# Patient Record
Sex: MALE | Race: WHITE | Employment: OTHER | URBAN - METROPOLITAN AREA
[De-identification: names, ages, dates, MRNs, and addresses within clinical notes are randomized per-mention and may not be internally consistent; named-entity substitution may affect disease eponyms.]

---

## 2017-03-02 ENCOUNTER — TELEPHONE (OUTPATIENT)
Dept: CARDIOLOGY CLINIC | Age: 67
End: 2017-03-02

## 2017-08-23 ENCOUNTER — OFFICE VISIT (OUTPATIENT)
Dept: CARDIOLOGY CLINIC | Age: 67
End: 2017-08-23

## 2017-08-23 VITALS
HEIGHT: 72 IN | SYSTOLIC BLOOD PRESSURE: 138 MMHG | HEART RATE: 60 BPM | WEIGHT: 200 LBS | DIASTOLIC BLOOD PRESSURE: 80 MMHG | BODY MASS INDEX: 27.09 KG/M2

## 2017-08-23 DIAGNOSIS — Z95.1 S/P CABG X 4: Primary | ICD-10-CM

## 2017-08-23 PROCEDURE — 99214 OFFICE O/P EST MOD 30 MIN: CPT | Performed by: INTERNAL MEDICINE

## 2017-08-23 PROCEDURE — G8427 DOCREV CUR MEDS BY ELIG CLIN: HCPCS | Performed by: INTERNAL MEDICINE

## 2017-08-23 PROCEDURE — 1036F TOBACCO NON-USER: CPT | Performed by: INTERNAL MEDICINE

## 2017-08-23 PROCEDURE — 4040F PNEUMOC VAC/ADMIN/RCVD: CPT | Performed by: INTERNAL MEDICINE

## 2017-08-23 PROCEDURE — 3017F COLORECTAL CA SCREEN DOC REV: CPT | Performed by: INTERNAL MEDICINE

## 2017-08-23 PROCEDURE — 1123F ACP DISCUSS/DSCN MKR DOCD: CPT | Performed by: INTERNAL MEDICINE

## 2017-08-23 PROCEDURE — G8419 CALC BMI OUT NRM PARAM NOF/U: HCPCS | Performed by: INTERNAL MEDICINE

## 2017-08-23 RX ORDER — FLUTICASONE FUROATE AND VILANTEROL 200; 25 UG/1; UG/1
1 POWDER RESPIRATORY (INHALATION) DAILY
COMMUNITY
End: 2019-08-30 | Stop reason: SDUPTHER

## 2017-09-12 ENCOUNTER — HOSPITAL ENCOUNTER (OUTPATIENT)
Dept: GENERAL RADIOLOGY | Age: 67
Discharge: HOME | End: 2017-09-12
Attending: FAMILY MEDICINE | Admitting: FAMILY MEDICINE

## 2017-09-12 DIAGNOSIS — M25.511 PAIN, JOINT, SHOULDER, RIGHT: ICD-10-CM

## 2017-09-26 ENCOUNTER — HOSPITAL ENCOUNTER (OUTPATIENT)
Dept: PHYSICAL THERAPY | Age: 67
Discharge: HOME | End: 2017-09-30
Attending: FAMILY MEDICINE | Admitting: FAMILY MEDICINE

## 2017-09-26 NOTE — FLOWSHEET NOTE
Physical Therapy Daily Treatment Note  Date:  2017    Patient Name:  Urszula Garcia    :  1950  MRN: [id number]  Restrictions/Precautions:    Diagnosis: R shoulder Pain  Treatment Diagnosis: Chronic R GH joint pain; decreased AROM, strength, and functional use of R GH joint  Insurance/Certification information:  Medicare/[employer]  Next Physician Visit:  Referring Physician:  Isabel FABIAN EXPIRATION DATE  Visit# / total visits:   Medicare/[employer], gcoded               Initial Pain level: /10     Subjective: Any changes to Ambulatory Summary Sheet? Goals:  Short term goals  Time Frame for Short term goals: 10 visits  Short term goal 1: Ind with HEP with good compliance  Short term goal 2: Sleeping without pain R shoudler  Short term goal 3: Quick Dash score 14    G-Code  PT G-Codes  Functional Limitation: Carrying, moving and handling objects  Carrying, Moving and Handling Objects Current Status (): At least 20 percent but less than 40 percent impaired, limited or restricted  Carrying, Moving and Handling Objects Goal Status (): At least 1 percent but less than 20 percent impaired, limited or restricted     Patient's goal:  Skilled PT activities: Date 17  #1 Date Date Date     Outcome measure used Quick dash         On visit# 1 19      Scap squeezes   3x 10      IR towel stretch behind back   8 ct, 2 x10      Cane ER stretch with elbow at side R   8 ct, 3x10       Sup.  Cane FF stretch  8 ct, 2 x10      Sup horizontal add stretch   8 ct, 2 x 10      Add standing pec stretches         Add t-band ER, IR, and ext                                                                    Progress/goals assessment (every 10 visits) by  PT           HEP: As above      Objective   findings:       Provider interaction:        Response to intervention:         Plan for Next Session: As above        Modality/intervention used:  [x] Therapeutic Exercise  [] Modalities:  [] Therapeutic Activity     [] Ultrasound  [] Elec  Stim  [] Gait Training      [] Cervical Traction [] Lumbar Traction  [] Neuromuscular Re-education    [] Cold/hotpack [] Iontophoresis   [x] Instruction in HEP      [] Vasopneumatic     [] Manual Therapy               [] Self care home management                    (    ) Dry needling    Post Tx Pain Rating:    Plan:(Fequency/duration/# of visits)   [] Continue per plan of care [] Alter current plan   [x] Plan of care initiated [] Hold pending MD visit [] Discharge         Time In: 1005  Time Out: 1055  Timed Code Treatment Minutes:  25  Total Treatment Minutes:  50    Electronically signed by:  López Jarrell, 9/26/2017, 12:27 PM

## 2017-09-26 NOTE — PROGRESS NOTES
Stand: Independent  Stand to sit: Independent  Ambulation  Ambulation?: Yes  Ambulation 1  Surface: carpet  Device: No Device  Quality of Gait: Not remarable     Exercises  Exercise 1: See daily flowsheet                      Assessment  Patient is a  74 yo male who presents with tightness, pain, mild weakness and decreased functional use of R GH joint, with forward wang shoulders which impacts on use of R shoulder;patient's goal is to decrease pain and improve function  Of R shoulder ;patient reports that paini limits activities including horizontal abd of arm and throwing objects; PT to address patient's goals, impairments and activity limitations with skilled interventions checked in plan of care;patient's level of function prior to onset of R GH joint pain was normal; did not observe any barriers to learning during PT eval; learning preferences include demonstration, practice, and handouts; patient expressed understanding of HEP; patient appears to be motivated to participate in an active PT program and to be compliant with HEP expectations;patient assisted in developing treatment plan and goals; no DME is currently being used;      Current functional level (based on )   :  Quick Dash  Conditions Requiring Skilled Therapeutic Intervention  Treatment Diagnosis: Chronic R GH joint pain; decreased AROM, strength, and functional use of R GH joint  Prognosis: Good  Decision Making: Medium Complexity  Patient Education: HEP  Barriers to Learning: None  REQUIRES PT FOLLOW UP: Yes  Discharge Recommendations: Outpatient PT  Activity Tolerance  Activity Tolerance: Patient limited by pain         Plan        G-Code  PT G-Codes  Functional Limitation: Carrying, moving and handling objects  Carrying, Moving and Handling Objects Current Status (): At least 20 percent but less than 40 percent impaired, limited or restricted  Carrying, Moving and Handling Objects Goal Status ():  At least 1 percent but less than 20

## 2017-10-01 ENCOUNTER — HOSPITAL ENCOUNTER (OUTPATIENT)
Dept: OTHER | Age: 67
Discharge: HOME | End: 2017-10-31
Attending: FAMILY MEDICINE | Admitting: FAMILY MEDICINE

## 2017-11-01 ENCOUNTER — HOSPITAL ENCOUNTER (OUTPATIENT)
Dept: OTHER | Age: 67
Discharge: HOME | End: 2017-11-30
Attending: FAMILY MEDICINE | Admitting: FAMILY MEDICINE

## 2017-11-14 LAB
ALBUMIN SERPL-MCNC: 4.1 G/DL
ALP BLD-CCNC: 67 U/L
ALT SERPL-CCNC: 17 U/L
ANION GAP SERPL CALCULATED.3IONS-SCNC: NORMAL MMOL/L
AST SERPL-CCNC: 22 U/L
BILIRUB SERPL-MCNC: 0.7 MG/DL (ref 0.1–1.4)
BUN BLDV-MCNC: 18 MG/DL
CALCIUM SERPL-MCNC: 9.4 MG/DL
CHLORIDE BLD-SCNC: 99 MMOL/L
CO2: 30 MMOL/L
CREAT SERPL-MCNC: 0.9 MG/DL
GFR CALCULATED: NORMAL
GLUCOSE BLD-MCNC: 103 MG/DL
POTASSIUM SERPL-SCNC: 4.3 MMOL/L
SODIUM BLD-SCNC: 140 MMOL/L
TOTAL PROTEIN: 6.8

## 2017-11-15 LAB
BASOPHILS ABSOLUTE: 0.1 /ΜL
BASOPHILS RELATIVE PERCENT: 1 %
CHOLESTEROL, TOTAL: 180 MG/DL
CHOLESTEROL/HDL RATIO: NORMAL
EOSINOPHILS ABSOLUTE: 0.1 /ΜL
EOSINOPHILS RELATIVE PERCENT: 1.9 %
HCT VFR BLD CALC: 44.8 % (ref 41–53)
HDLC SERPL-MCNC: 55 MG/DL (ref 35–70)
HEMOGLOBIN: 15 G/DL (ref 13.5–17.5)
LDL CHOLESTEROL CALCULATED: 102 MG/DL (ref 0–160)
LYMPHOCYTES ABSOLUTE: 1.6 /ΜL
LYMPHOCYTES RELATIVE PERCENT: 29.9 %
MCH RBC QN AUTO: 32.6 PG
MCHC RBC AUTO-ENTMCNC: 33.4 G/DL
MCV RBC AUTO: 97.5 FL
MONOCYTES ABSOLUTE: 0.5 /ΜL
MONOCYTES RELATIVE PERCENT: 10.1 %
NEUTROPHILS ABSOLUTE: 3 /ΜL
NEUTROPHILS RELATIVE PERCENT: 57.1 %
PLATELET # BLD: 264 K/ΜL
PMV BLD AUTO: NORMAL FL
RBC # BLD: 4.59 10^6/ΜL
TRIGL SERPL-MCNC: 117 MG/DL
VLDLC SERPL CALC-MCNC: 23 MG/DL
WBC # BLD: 5.3 10^3/ML

## 2018-02-05 ENCOUNTER — TELEPHONE (OUTPATIENT)
Dept: CARDIOLOGY CLINIC | Age: 68
End: 2018-02-05

## 2018-02-05 DIAGNOSIS — R07.9 CHEST PAIN, UNSPECIFIED TYPE: Primary | ICD-10-CM

## 2018-02-12 ENCOUNTER — PROCEDURE VISIT (OUTPATIENT)
Dept: CARDIOLOGY CLINIC | Age: 68
End: 2018-02-12

## 2018-02-12 DIAGNOSIS — R07.9 CHEST PAIN, UNSPECIFIED TYPE: ICD-10-CM

## 2018-02-12 DIAGNOSIS — Z95.1 S/P CABG X 4: Primary | ICD-10-CM

## 2018-02-12 LAB
LV EF: 60 %
LVEF MODALITY: NORMAL

## 2018-02-12 PROCEDURE — 93017 CV STRESS TEST TRACING ONLY: CPT | Performed by: INTERNAL MEDICINE

## 2018-02-12 PROCEDURE — A9500 TC99M SESTAMIBI: HCPCS | Performed by: INTERNAL MEDICINE

## 2018-02-12 PROCEDURE — 93018 CV STRESS TEST I&R ONLY: CPT | Performed by: INTERNAL MEDICINE

## 2018-02-12 PROCEDURE — 93016 CV STRESS TEST SUPVJ ONLY: CPT | Performed by: INTERNAL MEDICINE

## 2018-02-12 PROCEDURE — 78452 HT MUSCLE IMAGE SPECT MULT: CPT | Performed by: INTERNAL MEDICINE

## 2018-02-13 ENCOUNTER — TELEPHONE (OUTPATIENT)
Dept: CARDIOLOGY CLINIC | Age: 68
End: 2018-02-13

## 2018-06-29 ENCOUNTER — OFFICE VISIT (OUTPATIENT)
Dept: CARDIOLOGY CLINIC | Age: 68
End: 2018-06-29

## 2018-06-29 VITALS
DIASTOLIC BLOOD PRESSURE: 80 MMHG | SYSTOLIC BLOOD PRESSURE: 132 MMHG | HEIGHT: 71 IN | WEIGHT: 207.8 LBS | HEART RATE: 64 BPM | BODY MASS INDEX: 29.09 KG/M2

## 2018-06-29 DIAGNOSIS — I25.118 CORONARY ARTERY DISEASE OF NATIVE HEART WITH STABLE ANGINA PECTORIS, UNSPECIFIED VESSEL OR LESION TYPE (HCC): ICD-10-CM

## 2018-06-29 DIAGNOSIS — Z95.1 S/P CABG X 4: ICD-10-CM

## 2018-06-29 DIAGNOSIS — I10 HYPERTENSION, UNSPECIFIED TYPE: Primary | ICD-10-CM

## 2018-06-29 DIAGNOSIS — E78.5 HYPERLIPIDEMIA, UNSPECIFIED HYPERLIPIDEMIA TYPE: ICD-10-CM

## 2018-06-29 DIAGNOSIS — I73.9 CLAUDICATION (HCC): ICD-10-CM

## 2018-06-29 PROBLEM — I25.10 CAD (CORONARY ATHEROSCLEROTIC DISEASE): Status: ACTIVE | Noted: 2018-06-29

## 2018-06-29 PROCEDURE — 99214 OFFICE O/P EST MOD 30 MIN: CPT | Performed by: NURSE PRACTITIONER

## 2018-08-10 ENCOUNTER — TELEPHONE (OUTPATIENT)
Dept: CARDIOLOGY CLINIC | Age: 68
End: 2018-08-10

## 2018-08-29 ENCOUNTER — TELEPHONE (OUTPATIENT)
Dept: CARDIOLOGY CLINIC | Age: 68
End: 2018-08-29

## 2018-12-17 ENCOUNTER — OFFICE VISIT (OUTPATIENT)
Dept: CARDIOLOGY CLINIC | Age: 68
End: 2018-12-17
Payer: MEDICARE

## 2018-12-17 VITALS
BODY MASS INDEX: 30.1 KG/M2 | DIASTOLIC BLOOD PRESSURE: 84 MMHG | HEART RATE: 72 BPM | HEIGHT: 71 IN | SYSTOLIC BLOOD PRESSURE: 132 MMHG | WEIGHT: 215 LBS

## 2018-12-17 DIAGNOSIS — Z95.1 S/P CABG X 4: Primary | ICD-10-CM

## 2018-12-17 PROCEDURE — G8598 ASA/ANTIPLAT THER USED: HCPCS | Performed by: INTERNAL MEDICINE

## 2018-12-17 PROCEDURE — 1101F PT FALLS ASSESS-DOCD LE1/YR: CPT | Performed by: INTERNAL MEDICINE

## 2018-12-17 PROCEDURE — 99213 OFFICE O/P EST LOW 20 MIN: CPT | Performed by: INTERNAL MEDICINE

## 2018-12-17 PROCEDURE — 1123F ACP DISCUSS/DSCN MKR DOCD: CPT | Performed by: INTERNAL MEDICINE

## 2018-12-17 PROCEDURE — 3017F COLORECTAL CA SCREEN DOC REV: CPT | Performed by: INTERNAL MEDICINE

## 2018-12-17 PROCEDURE — G8419 CALC BMI OUT NRM PARAM NOF/U: HCPCS | Performed by: INTERNAL MEDICINE

## 2018-12-17 PROCEDURE — G8427 DOCREV CUR MEDS BY ELIG CLIN: HCPCS | Performed by: INTERNAL MEDICINE

## 2018-12-17 PROCEDURE — 1036F TOBACCO NON-USER: CPT | Performed by: INTERNAL MEDICINE

## 2018-12-17 PROCEDURE — G8484 FLU IMMUNIZE NO ADMIN: HCPCS | Performed by: INTERNAL MEDICINE

## 2018-12-17 PROCEDURE — 4040F PNEUMOC VAC/ADMIN/RCVD: CPT | Performed by: INTERNAL MEDICINE

## 2018-12-17 NOTE — PROGRESS NOTES
12/17/2018    RE: Tavares Crawleye  (1950)                               TO:  Dr. Kunal Buck MD        Patient referred for evaluation and management of   cad        Laurita Betancourt is a 70 y.o. male who was seen today for management of  cad                                    HPI:   Patient is here for    - Coronary artery disease, does not have chest pain. Patient is  compliant with prescribed medicines. - Hypertension,is  well controlled, pt is  compliant with medicines    - Hyperlipidimea, lipids are in acceptable range.  Pt  is  compliant with medicines                  The patient  does not have   cardiac complain    Tavares Rhodes has the following history recorded in care path:  Patient Active Problem List    Diagnosis Date Noted    CAD (coronary atherosclerotic disease) 06/29/2018    Claudication ([facility] [address] 05/19/2016    H/O cardiovascular stress test 05/29/2014    S/P CABG x 4 05/05/2014    History of percutaneous left heart catheterization 05/03/2014    Hypertension 05/02/2014    Tobacco abuse 05/02/2014    NSTEMI (non-ST elevated myocardial infarction) ([facility] [address] 05/02/2014     Current Outpatient Prescriptions   Medication Sig Dispense Refill    Evolocumab 140 MG/ML SOAJ Inject 140 mg into the skin every 14 days (Patient taking differently: Inject 140 mg into the skin every 14 days ) 2 pen 5    Fluticasone Furoate-Vilanterol (BREO ELLIPTA) 200-25 MCG/INH AEPB Inhale into the lungs      clopidogrel (PLAVIX) 75 MG tablet TAKE ONE TABLET BY MOUTH DAILY (Patient taking differently: TAKE ONE TABLET BY MOUTH DAILY 1/2 tab daily) 90 tablet 3    amLODIPine (NORVASC) 5 MG tablet Take 5 mg by mouth daily      lisinopril (PRINIVIL;ZESTRIL) 20 MG tablet Take 1 tablet by mouth daily 90 tablet 3    carvedilol (COREG) 12.5 MG tablet Take 1 tablet by mouth 2 times daily (with meals) 180 tablet 3    aspirin 81 MG tablet Take 81 mg by mouth daily. No current facility-administered medications for this visit.       Allergies: Statins  Past Medical History:   Diagnosis Date    Arthritis     CAD (coronary artery disease)     COPD (chronic obstructive pulmonary disease) ([facility] [address]     H/O cardiovascular stress test 5/29/2014    cardiolite-normal, no ischemia, EF 60%    H/O cardiovascular stress test 1/18/2015    cardiolite-normal, EF51%    H/O cardiovascular stress test 02/12/2018    EF60% normal study    H/O Doppler ultrasound 6/1/2016    Lower Ext Arterial- normal study, mild stenosis in RLE    H/O echocardiogram 5/28/2014    EF35-45% mild MR, mild AV calcification    History of percutaneous left heart catheterization 5/3/2014    RCA 99% distal stenosis EF55%    Hyperlipidemia     Hypertension     NSTEMI (non-ST elevated myocardial infarction) ([facility] [address]     S/P CABG x 4 5/5/2014    vein graft diagonal coronary artery,vein graft distal RCA,vein graft obtuse marginal coronary artery and left internal mammary artery to the left anterior descending     Past Surgical History:   Procedure Laterality Date    CARDIAC SURGERY  5/5/2014    CABG X 4     COLONOSCOPY  10-    SKIN CANCER EXCISION  1992    basal cell excision from nose    TONSILLECTOMY      as a child      As reviewed   Family History   Problem Relation Age of Onset    Adopted: Yes     Social History   Substance Use Topics    Smoking status: Former Smoker     Packs/day: 1.00     Years: 40.00     Quit date: 5/2/2014    Smokeless tobacco: Never Used    Alcohol use No      Review of Systems:    Constitutional: Negative for diaphoresis and fatigue  Psychological:Negative for anxiety or depression  HENT: Negative for headaches, nasal congestion, sinus pain or vertigo  Eyes: Negative for

## 2019-01-29 LAB
AVERAGE GLUCOSE: NORMAL
HBA1C MFR BLD: 5.8 %

## 2019-02-01 ENCOUNTER — HOSPITAL ENCOUNTER (OUTPATIENT)
Dept: ULTRASOUND IMAGING | Age: 69
Discharge: HOME | End: 2019-02-01
Payer: MEDICARE

## 2019-02-01 DIAGNOSIS — I73.9 PERIPHERAL VASCULAR DISEASE, UNSPECIFIED (HCC): ICD-10-CM

## 2019-02-01 PROCEDURE — 93925 LOWER EXTREMITY STUDY: CPT

## 2019-02-06 ENCOUNTER — OFFICE VISIT (OUTPATIENT)
Dept: CARDIOLOGY CLINIC | Age: 69
End: 2019-02-06
Payer: MEDICARE

## 2019-02-06 VITALS
BODY MASS INDEX: 29.96 KG/M2 | WEIGHT: 214 LBS | DIASTOLIC BLOOD PRESSURE: 78 MMHG | HEIGHT: 71 IN | SYSTOLIC BLOOD PRESSURE: 124 MMHG | HEART RATE: 68 BPM

## 2019-02-06 DIAGNOSIS — Z95.1 S/P CABG X 4: Primary | ICD-10-CM

## 2019-02-06 PROCEDURE — G8419 CALC BMI OUT NRM PARAM NOF/U: HCPCS | Performed by: INTERNAL MEDICINE

## 2019-02-06 PROCEDURE — 99214 OFFICE O/P EST MOD 30 MIN: CPT | Performed by: INTERNAL MEDICINE

## 2019-02-06 PROCEDURE — 1036F TOBACCO NON-USER: CPT | Performed by: INTERNAL MEDICINE

## 2019-02-06 PROCEDURE — 4040F PNEUMOC VAC/ADMIN/RCVD: CPT | Performed by: INTERNAL MEDICINE

## 2019-02-06 PROCEDURE — G8484 FLU IMMUNIZE NO ADMIN: HCPCS | Performed by: INTERNAL MEDICINE

## 2019-02-06 PROCEDURE — 3017F COLORECTAL CA SCREEN DOC REV: CPT | Performed by: INTERNAL MEDICINE

## 2019-02-06 PROCEDURE — G8598 ASA/ANTIPLAT THER USED: HCPCS | Performed by: INTERNAL MEDICINE

## 2019-02-06 PROCEDURE — 1123F ACP DISCUSS/DSCN MKR DOCD: CPT | Performed by: INTERNAL MEDICINE

## 2019-02-06 PROCEDURE — G8427 DOCREV CUR MEDS BY ELIG CLIN: HCPCS | Performed by: INTERNAL MEDICINE

## 2019-02-06 PROCEDURE — 1101F PT FALLS ASSESS-DOCD LE1/YR: CPT | Performed by: INTERNAL MEDICINE

## 2019-02-11 ENCOUNTER — HOSPITAL ENCOUNTER (OUTPATIENT)
Dept: GENERAL RADIOLOGY | Age: 69
Discharge: HOME | End: 2019-02-11
Payer: MEDICARE

## 2019-02-11 ENCOUNTER — HOSPITAL ENCOUNTER (OUTPATIENT)
Age: 69
Discharge: HOME | End: 2019-02-11
Payer: MEDICARE

## 2019-02-11 ENCOUNTER — TELEPHONE (OUTPATIENT)
Dept: CARDIOLOGY CLINIC | Age: 69
End: 2019-02-11

## 2019-02-11 DIAGNOSIS — Z01.818 PRE-PROCEDURAL EXAMINATION: ICD-10-CM

## 2019-02-11 LAB
ANION GAP SERPL CALCULATED.3IONS-SCNC: 14 MMOL/L (ref 4–16)
APTT: 30.9 SECONDS (ref 21.2–33)
BASOPHILS ABSOLUTE: 0 K/CU MM
BASOPHILS RELATIVE PERCENT: 0.5 % (ref 0–1)
BUN BLDV-MCNC: 17 MG/DL (ref 6–23)
CALCIUM SERPL-MCNC: 8.9 MG/DL (ref 8.3–10.6)
CHLORIDE BLD-SCNC: 98 MMOL/L (ref 99–110)
CO2: 26 MMOL/L (ref 21–32)
CREAT SERPL-MCNC: 1.1 MG/DL (ref 0.9–1.3)
DIFFERENTIAL TYPE: ABNORMAL
EOSINOPHILS ABSOLUTE: 0.1 K/CU MM
EOSINOPHILS RELATIVE PERCENT: 1.9 % (ref 0–3)
GFR AFRICAN AMERICAN: >60 ML/MIN/1.73M2
GFR NON-AFRICAN AMERICAN: >60 ML/MIN/1.73M2
GLUCOSE BLD-MCNC: 70 MG/DL (ref 70–99)
HCT VFR BLD CALC: 46.5 % (ref 42–52)
HEMOGLOBIN: 15.2 GM/DL (ref 13.5–18)
IMMATURE NEUTROPHIL %: 0.4 % (ref 0–0.43)
INR BLD: 1.01 INDEX
LYMPHOCYTES ABSOLUTE: 1.5 K/CU MM
LYMPHOCYTES RELATIVE PERCENT: 19.9 % (ref 24–44)
MCH RBC QN AUTO: 32.8 PG (ref 27–31)
MCHC RBC AUTO-ENTMCNC: 32.7 % (ref 32–36)
MCV RBC AUTO: 100.4 FL (ref 78–100)
MONOCYTES ABSOLUTE: 0.7 K/CU MM
MONOCYTES RELATIVE PERCENT: 9.2 % (ref 0–4)
NUCLEATED RBC %: 0 %
PDW BLD-RTO: 13 % (ref 11.7–14.9)
PLATELET # BLD: 248 K/CU MM (ref 140–440)
PMV BLD AUTO: 8.7 FL (ref 7.5–11.1)
POTASSIUM SERPL-SCNC: 4.3 MMOL/L (ref 3.5–5.1)
PROTHROMBIN TIME: 11.5 SECONDS (ref 9.12–12.5)
RBC # BLD: 4.63 M/CU MM (ref 4.6–6.2)
SEGMENTED NEUTROPHILS ABSOLUTE COUNT: 5.1 K/CU MM
SEGMENTED NEUTROPHILS RELATIVE PERCENT: 68.1 % (ref 36–66)
SODIUM BLD-SCNC: 138 MMOL/L (ref 135–145)
TOTAL IMMATURE NEUTOROPHIL: 0.03 K/CU MM
TOTAL NUCLEATED RBC: 0 K/CU MM
WBC # BLD: 7.5 K/CU MM (ref 4–10.5)

## 2019-02-11 PROCEDURE — 36415 COLL VENOUS BLD VENIPUNCTURE: CPT

## 2019-02-11 PROCEDURE — 85730 THROMBOPLASTIN TIME PARTIAL: CPT

## 2019-02-11 PROCEDURE — 86900 BLOOD TYPING SEROLOGIC ABO: CPT

## 2019-02-11 PROCEDURE — 85610 PROTHROMBIN TIME: CPT

## 2019-02-11 PROCEDURE — 85025 COMPLETE CBC W/AUTO DIFF WBC: CPT

## 2019-02-11 PROCEDURE — 86901 BLOOD TYPING SEROLOGIC RH(D): CPT

## 2019-02-11 PROCEDURE — 71046 X-RAY EXAM CHEST 2 VIEWS: CPT

## 2019-02-11 PROCEDURE — 80048 BASIC METABOLIC PNL TOTAL CA: CPT

## 2019-02-11 PROCEDURE — 86850 RBC ANTIBODY SCREEN: CPT

## 2019-02-12 ENCOUNTER — HOSPITAL ENCOUNTER (OUTPATIENT)
Dept: CARDIAC CATH/INVASIVE PROCEDURES | Age: 69
Discharge: HOME | End: 2019-02-12
Attending: INTERNAL MEDICINE | Admitting: INTERNAL MEDICINE
Payer: MEDICARE

## 2019-02-12 VITALS
HEIGHT: 71 IN | DIASTOLIC BLOOD PRESSURE: 89 MMHG | HEART RATE: 73 BPM | BODY MASS INDEX: 29.96 KG/M2 | RESPIRATION RATE: 20 BRPM | WEIGHT: 214 LBS | SYSTOLIC BLOOD PRESSURE: 145 MMHG | OXYGEN SATURATION: 95 % | TEMPERATURE: 98.7 F

## 2019-02-12 LAB
ACTIVATED CLOTTING TIME, LOW RANGE: 155 SEC
ACTIVATED CLOTTING TIME, LOW RANGE: 181 SEC
ACTIVATED CLOTTING TIME, LOW RANGE: 242 SEC
ACTIVATED CLOTTING TIME, LOW RANGE: 251 SEC

## 2019-02-12 PROCEDURE — 2709999900 HC NON-CHARGEABLE SUPPLY

## 2019-02-12 PROCEDURE — 2580000003 HC RX 258: Performed by: INTERNAL MEDICINE

## 2019-02-12 PROCEDURE — C1769 GUIDE WIRE: HCPCS

## 2019-02-12 PROCEDURE — C1887 CATHETER, GUIDING: HCPCS

## 2019-02-12 PROCEDURE — C1714 CATH, TRANS ATHERECTOMY, DIR: HCPCS

## 2019-02-12 PROCEDURE — C1884 EMBOLIZATION PROTECT SYST: HCPCS

## 2019-02-12 PROCEDURE — 37225 HC FEM POP TERRITORY ATHERECTOMY: CPT

## 2019-02-12 PROCEDURE — C1876 STENT, NON-COA/NON-COV W/DEL: HCPCS

## 2019-02-12 PROCEDURE — 6370000000 HC RX 637 (ALT 250 FOR IP): Performed by: INTERNAL MEDICINE

## 2019-02-12 PROCEDURE — 2500000003 HC RX 250 WO HCPCS

## 2019-02-12 PROCEDURE — 75625 CONTRAST EXAM ABDOMINL AORTA: CPT | Performed by: INTERNAL MEDICINE

## 2019-02-12 PROCEDURE — 75630 X-RAY AORTA LEG ARTERIES: CPT

## 2019-02-12 PROCEDURE — 2580000003 HC RX 258

## 2019-02-12 PROCEDURE — 75716 ARTERY X-RAYS ARMS/LEGS: CPT | Performed by: INTERNAL MEDICINE

## 2019-02-12 PROCEDURE — 37225 PR REVSC OPN/PRQ FEM/POP W/ATHRC/ANGIOP SM VSL: CPT | Performed by: INTERNAL MEDICINE

## 2019-02-12 PROCEDURE — C1894 INTRO/SHEATH, NON-LASER: HCPCS

## 2019-02-12 PROCEDURE — C2623 CATH, TRANSLUMIN, DRUG-COAT: HCPCS

## 2019-02-12 PROCEDURE — 6360000004 HC RX CONTRAST MEDICATION

## 2019-02-12 PROCEDURE — 37221 PR REVSC OPN/PRQ ILIAC ART W/STNT PLMT & ANGIOPLSTY: CPT | Performed by: INTERNAL MEDICINE

## 2019-02-12 PROCEDURE — 85347 COAGULATION TIME ACTIVATED: CPT

## 2019-02-12 PROCEDURE — 6360000002 HC RX W HCPCS

## 2019-02-12 PROCEDURE — 37221 HC ILIAC TERRITORY PLASTY STENT: CPT

## 2019-02-12 RX ORDER — ACETAMINOPHEN 325 MG/1
650 TABLET ORAL EVERY 4 HOURS PRN
Status: DISCONTINUED | OUTPATIENT
Start: 2019-02-12 | End: 2019-02-12 | Stop reason: HOSPADM

## 2019-02-12 RX ORDER — SODIUM CHLORIDE 9 MG/ML
INJECTION, SOLUTION INTRAVENOUS CONTINUOUS
Status: DISCONTINUED | OUTPATIENT
Start: 2019-02-12 | End: 2019-02-12 | Stop reason: HOSPADM

## 2019-02-12 RX ORDER — DIAZEPAM 5 MG/1
5 TABLET ORAL ONCE
Status: COMPLETED | OUTPATIENT
Start: 2019-02-12 | End: 2019-02-12

## 2019-02-12 RX ORDER — SIMVASTATIN 20 MG
20 TABLET ORAL NIGHTLY
Status: DISCONTINUED | OUTPATIENT
Start: 2019-02-12 | End: 2019-02-12 | Stop reason: HOSPADM

## 2019-02-12 RX ORDER — SODIUM CHLORIDE 0.9 % (FLUSH) 0.9 %
10 SYRINGE (ML) INJECTION PRN
Status: DISCONTINUED | OUTPATIENT
Start: 2019-02-12 | End: 2019-02-12 | Stop reason: HOSPADM

## 2019-02-12 RX ORDER — SODIUM CHLORIDE 0.9 % (FLUSH) 0.9 %
10 SYRINGE (ML) INJECTION EVERY 12 HOURS SCHEDULED
Status: DISCONTINUED | OUTPATIENT
Start: 2019-02-12 | End: 2019-02-12 | Stop reason: HOSPADM

## 2019-02-12 RX ORDER — DIPHENHYDRAMINE HCL 25 MG
25 TABLET ORAL ONCE
Status: COMPLETED | OUTPATIENT
Start: 2019-02-12 | End: 2019-02-12

## 2019-02-12 RX ORDER — FAMOTIDINE 20 MG/1
20 TABLET, FILM COATED ORAL 2 TIMES DAILY
Status: DISCONTINUED | OUTPATIENT
Start: 2019-02-12 | End: 2019-02-12 | Stop reason: HOSPADM

## 2019-02-12 RX ADMIN — DIPHENHYDRAMINE HCL 25 MG: 25 TABLET ORAL at 06:46

## 2019-02-12 RX ADMIN — DIAZEPAM 5 MG: 5 TABLET ORAL at 06:46

## 2019-02-12 RX ADMIN — SODIUM CHLORIDE: 9 INJECTION, SOLUTION INTRAVENOUS at 06:47

## 2019-02-13 ENCOUNTER — TELEPHONE (OUTPATIENT)
Dept: CARDIOLOGY CLINIC | Age: 69
End: 2019-02-13

## 2019-02-26 ENCOUNTER — OFFICE VISIT (OUTPATIENT)
Dept: CARDIOLOGY CLINIC | Age: 69
End: 2019-02-26
Payer: MEDICARE

## 2019-02-26 VITALS
WEIGHT: 215.4 LBS | SYSTOLIC BLOOD PRESSURE: 110 MMHG | HEIGHT: 71 IN | BODY MASS INDEX: 30.15 KG/M2 | DIASTOLIC BLOOD PRESSURE: 80 MMHG | HEART RATE: 70 BPM

## 2019-02-26 DIAGNOSIS — Z95.1 S/P CABG X 4: Primary | ICD-10-CM

## 2019-02-26 DIAGNOSIS — I73.9 PVD (PERIPHERAL VASCULAR DISEASE) WITH CLAUDICATION (HCC): ICD-10-CM

## 2019-02-26 PROCEDURE — G8598 ASA/ANTIPLAT THER USED: HCPCS | Performed by: INTERNAL MEDICINE

## 2019-02-26 PROCEDURE — 1036F TOBACCO NON-USER: CPT | Performed by: INTERNAL MEDICINE

## 2019-02-26 PROCEDURE — G8484 FLU IMMUNIZE NO ADMIN: HCPCS | Performed by: INTERNAL MEDICINE

## 2019-02-26 PROCEDURE — 1123F ACP DISCUSS/DSCN MKR DOCD: CPT | Performed by: INTERNAL MEDICINE

## 2019-02-26 PROCEDURE — 1101F PT FALLS ASSESS-DOCD LE1/YR: CPT | Performed by: INTERNAL MEDICINE

## 2019-02-26 PROCEDURE — 99214 OFFICE O/P EST MOD 30 MIN: CPT | Performed by: INTERNAL MEDICINE

## 2019-02-26 PROCEDURE — G8417 CALC BMI ABV UP PARAM F/U: HCPCS | Performed by: INTERNAL MEDICINE

## 2019-02-26 PROCEDURE — G8428 CUR MEDS NOT DOCUMENT: HCPCS | Performed by: INTERNAL MEDICINE

## 2019-02-26 PROCEDURE — 3017F COLORECTAL CA SCREEN DOC REV: CPT | Performed by: INTERNAL MEDICINE

## 2019-02-26 PROCEDURE — 4040F PNEUMOC VAC/ADMIN/RCVD: CPT | Performed by: INTERNAL MEDICINE

## 2019-03-01 ENCOUNTER — TELEPHONE (OUTPATIENT)
Dept: CARDIOLOGY CLINIC | Age: 69
End: 2019-03-01

## 2019-03-05 ENCOUNTER — HOSPITAL ENCOUNTER (OUTPATIENT)
Age: 69
Discharge: HOME | End: 2019-03-05
Payer: MEDICARE

## 2019-03-05 ENCOUNTER — HOSPITAL ENCOUNTER (OUTPATIENT)
Dept: GENERAL RADIOLOGY | Age: 69
Discharge: HOME | End: 2019-03-05
Payer: MEDICARE

## 2019-03-05 DIAGNOSIS — R06.02 SOB (SHORTNESS OF BREATH): ICD-10-CM

## 2019-03-05 LAB
ANION GAP SERPL CALCULATED.3IONS-SCNC: 10 MMOL/L (ref 4–16)
APTT: 30.6 SECONDS (ref 21.2–33)
BASOPHILS ABSOLUTE: 0.1 K/CU MM
BASOPHILS RELATIVE PERCENT: 0.8 % (ref 0–1)
BUN BLDV-MCNC: 21 MG/DL (ref 6–23)
CALCIUM SERPL-MCNC: 9.3 MG/DL (ref 8.3–10.6)
CHLORIDE BLD-SCNC: 102 MMOL/L (ref 99–110)
CO2: 29 MMOL/L (ref 21–32)
CREAT SERPL-MCNC: 1 MG/DL (ref 0.9–1.3)
DIFFERENTIAL TYPE: ABNORMAL
EOSINOPHILS ABSOLUTE: 0.1 K/CU MM
EOSINOPHILS RELATIVE PERCENT: 1.5 % (ref 0–3)
GFR AFRICAN AMERICAN: >60 ML/MIN/1.73M2
GFR NON-AFRICAN AMERICAN: >60 ML/MIN/1.73M2
GLUCOSE BLD-MCNC: 50 MG/DL (ref 70–99)
HCT VFR BLD CALC: 48.4 % (ref 42–52)
HEMOGLOBIN: 15.3 GM/DL (ref 13.5–18)
IMMATURE NEUTROPHIL %: 0.3 % (ref 0–0.43)
INR BLD: 1.03 INDEX
LYMPHOCYTES ABSOLUTE: 1.8 K/CU MM
LYMPHOCYTES RELATIVE PERCENT: 26.5 % (ref 24–44)
MCH RBC QN AUTO: 32.9 PG (ref 27–31)
MCHC RBC AUTO-ENTMCNC: 31.6 % (ref 32–36)
MCV RBC AUTO: 104.1 FL (ref 78–100)
MONOCYTES ABSOLUTE: 0.7 K/CU MM
MONOCYTES RELATIVE PERCENT: 10.1 % (ref 0–4)
NUCLEATED RBC %: 0 %
PDW BLD-RTO: 13.4 % (ref 11.7–14.9)
PLATELET # BLD: 238 K/CU MM (ref 140–440)
PMV BLD AUTO: 8.8 FL (ref 7.5–11.1)
POTASSIUM SERPL-SCNC: 4.4 MMOL/L (ref 3.5–5.1)
PROTHROMBIN TIME: 11.9 SECONDS (ref 9.12–12.5)
RBC # BLD: 4.65 M/CU MM (ref 4.6–6.2)
SEGMENTED NEUTROPHILS ABSOLUTE COUNT: 4 K/CU MM
SEGMENTED NEUTROPHILS RELATIVE PERCENT: 60.8 % (ref 36–66)
SODIUM BLD-SCNC: 141 MMOL/L (ref 135–145)
TOTAL IMMATURE NEUTOROPHIL: 0.02 K/CU MM
TOTAL NUCLEATED RBC: 0 K/CU MM
WBC # BLD: 6.6 K/CU MM (ref 4–10.5)

## 2019-03-05 PROCEDURE — 85610 PROTHROMBIN TIME: CPT

## 2019-03-05 PROCEDURE — 80048 BASIC METABOLIC PNL TOTAL CA: CPT

## 2019-03-05 PROCEDURE — 71046 X-RAY EXAM CHEST 2 VIEWS: CPT

## 2019-03-05 PROCEDURE — 85025 COMPLETE CBC W/AUTO DIFF WBC: CPT

## 2019-03-05 PROCEDURE — 86900 BLOOD TYPING SEROLOGIC ABO: CPT

## 2019-03-05 PROCEDURE — 86901 BLOOD TYPING SEROLOGIC RH(D): CPT

## 2019-03-05 PROCEDURE — 36415 COLL VENOUS BLD VENIPUNCTURE: CPT

## 2019-03-05 PROCEDURE — 85730 THROMBOPLASTIN TIME PARTIAL: CPT

## 2019-03-05 PROCEDURE — 86850 RBC ANTIBODY SCREEN: CPT

## 2019-03-06 ENCOUNTER — TELEPHONE (OUTPATIENT)
Dept: CARDIOLOGY CLINIC | Age: 69
End: 2019-03-06

## 2019-03-07 ENCOUNTER — HOSPITAL ENCOUNTER (OUTPATIENT)
Dept: CARDIAC CATH/INVASIVE PROCEDURES | Age: 69
Discharge: HOME | End: 2019-03-07
Attending: INTERNAL MEDICINE | Admitting: INTERNAL MEDICINE
Payer: MEDICARE

## 2019-03-07 VITALS
BODY MASS INDEX: 30.1 KG/M2 | OXYGEN SATURATION: 96 % | RESPIRATION RATE: 19 BRPM | DIASTOLIC BLOOD PRESSURE: 86 MMHG | WEIGHT: 215 LBS | SYSTOLIC BLOOD PRESSURE: 145 MMHG | TEMPERATURE: 97.9 F | HEART RATE: 63 BPM | HEIGHT: 71 IN

## 2019-03-07 PROCEDURE — 6360000002 HC RX W HCPCS

## 2019-03-07 PROCEDURE — 75710 ARTERY X-RAYS ARM/LEG: CPT | Performed by: INTERNAL MEDICINE

## 2019-03-07 PROCEDURE — C1769 GUIDE WIRE: HCPCS

## 2019-03-07 PROCEDURE — 2500000003 HC RX 250 WO HCPCS

## 2019-03-07 PROCEDURE — 75710 ARTERY X-RAYS ARM/LEG: CPT

## 2019-03-07 PROCEDURE — 2580000003 HC RX 258: Performed by: INTERNAL MEDICINE

## 2019-03-07 PROCEDURE — 2709999900 HC NON-CHARGEABLE SUPPLY

## 2019-03-07 PROCEDURE — 6360000004 HC RX CONTRAST MEDICATION

## 2019-03-07 PROCEDURE — 36247 INS CATH ABD/L-EXT ART 3RD: CPT

## 2019-03-07 PROCEDURE — 6370000000 HC RX 637 (ALT 250 FOR IP): Performed by: INTERNAL MEDICINE

## 2019-03-07 PROCEDURE — C1894 INTRO/SHEATH, NON-LASER: HCPCS

## 2019-03-07 RX ORDER — DIAZEPAM 5 MG/1
5 TABLET ORAL ONCE
Status: COMPLETED | OUTPATIENT
Start: 2019-03-07 | End: 2019-03-07

## 2019-03-07 RX ORDER — SODIUM CHLORIDE 0.9 % (FLUSH) 0.9 %
10 SYRINGE (ML) INJECTION PRN
Status: DISCONTINUED | OUTPATIENT
Start: 2019-03-07 | End: 2019-03-07 | Stop reason: HOSPADM

## 2019-03-07 RX ORDER — DIPHENHYDRAMINE HCL 25 MG
25 TABLET ORAL ONCE
Status: COMPLETED | OUTPATIENT
Start: 2019-03-07 | End: 2019-03-07

## 2019-03-07 RX ORDER — SODIUM CHLORIDE 0.9 % (FLUSH) 0.9 %
10 SYRINGE (ML) INJECTION EVERY 12 HOURS SCHEDULED
Status: DISCONTINUED | OUTPATIENT
Start: 2019-03-07 | End: 2019-03-07 | Stop reason: HOSPADM

## 2019-03-07 RX ORDER — SODIUM CHLORIDE 9 MG/ML
INJECTION, SOLUTION INTRAVENOUS CONTINUOUS
Status: DISCONTINUED | OUTPATIENT
Start: 2019-03-07 | End: 2019-03-07 | Stop reason: HOSPADM

## 2019-03-07 RX ORDER — ACETAMINOPHEN 325 MG/1
650 TABLET ORAL EVERY 4 HOURS PRN
Status: DISCONTINUED | OUTPATIENT
Start: 2019-03-07 | End: 2019-03-07 | Stop reason: HOSPADM

## 2019-03-07 RX ADMIN — DIAZEPAM 5 MG: 5 TABLET ORAL at 11:07

## 2019-03-07 RX ADMIN — DIPHENHYDRAMINE HCL 25 MG: 25 TABLET ORAL at 11:07

## 2019-03-07 RX ADMIN — SODIUM CHLORIDE: 9 INJECTION, SOLUTION INTRAVENOUS at 11:07

## 2019-03-11 ENCOUNTER — TELEPHONE (OUTPATIENT)
Dept: CARDIOLOGY CLINIC | Age: 69
End: 2019-03-11

## 2019-03-14 ENCOUNTER — ANESTHESIA EVENT (OUTPATIENT)
Dept: OPERATING ROOM | Age: 69
DRG: 254 | End: 2019-03-14
Payer: MEDICARE

## 2019-03-18 ENCOUNTER — HOSPITAL ENCOUNTER (OUTPATIENT)
Dept: GENERAL RADIOLOGY | Age: 69
Discharge: HOME | End: 2019-03-18
Payer: MEDICARE

## 2019-03-18 ENCOUNTER — HOSPITAL ENCOUNTER (OUTPATIENT)
Dept: PREADMISSION TESTING | Age: 69
Discharge: HOME | End: 2019-03-22
Payer: MEDICARE

## 2019-03-18 VITALS
WEIGHT: 213.38 LBS | BODY MASS INDEX: 30.55 KG/M2 | HEART RATE: 74 BPM | TEMPERATURE: 97.4 F | OXYGEN SATURATION: 97 % | HEIGHT: 70 IN | SYSTOLIC BLOOD PRESSURE: 130 MMHG | RESPIRATION RATE: 16 BRPM | DIASTOLIC BLOOD PRESSURE: 86 MMHG

## 2019-03-18 DIAGNOSIS — Z41.9 ELECTIVE SURGERY: ICD-10-CM

## 2019-03-18 DIAGNOSIS — Z41.9 ELECTIVE SURGERY: Primary | ICD-10-CM

## 2019-03-18 LAB
ABO/RH: NORMAL
ANION GAP SERPL CALCULATED.3IONS-SCNC: 10 MMOL/L (ref 4–16)
ANTIBODY SCREEN: NEGATIVE
APTT: 31.2 SECONDS (ref 21.2–33)
BUN BLDV-MCNC: 16 MG/DL (ref 6–23)
CALCIUM SERPL-MCNC: 9.5 MG/DL (ref 8.3–10.6)
CHLORIDE BLD-SCNC: 100 MMOL/L (ref 99–110)
CO2: 26 MMOL/L (ref 21–32)
COMMENT: NORMAL
CREAT SERPL-MCNC: 1 MG/DL (ref 0.9–1.3)
EKG ATRIAL RATE: 64 BPM
EKG DIAGNOSIS: NORMAL
EKG P AXIS: 65 DEGREES
EKG P-R INTERVAL: 172 MS
EKG Q-T INTERVAL: 408 MS
EKG QRS DURATION: 90 MS
EKG QTC CALCULATION (BAZETT): 420 MS
EKG R AXIS: 22 DEGREES
EKG T AXIS: 88 DEGREES
EKG VENTRICULAR RATE: 64 BPM
GFR AFRICAN AMERICAN: >60 ML/MIN/1.73M2
GFR NON-AFRICAN AMERICAN: >60 ML/MIN/1.73M2
GLUCOSE BLD-MCNC: 103 MG/DL (ref 70–99)
HCT VFR BLD CALC: 47.4 % (ref 42–52)
HEMOGLOBIN: 15.4 GM/DL (ref 13.5–18)
INR BLD: 0.97 INDEX
MCH RBC QN AUTO: 32.4 PG (ref 27–31)
MCHC RBC AUTO-ENTMCNC: 32.5 % (ref 32–36)
MCV RBC AUTO: 99.6 FL (ref 78–100)
PDW BLD-RTO: 13 % (ref 11.7–14.9)
PLATELET # BLD: 241 K/CU MM (ref 140–440)
PMV BLD AUTO: 8.7 FL (ref 7.5–11.1)
POTASSIUM SERPL-SCNC: 4.4 MMOL/L (ref 3.5–5.1)
PROTHROMBIN TIME: 11.1 SECONDS (ref 9.12–12.5)
RBC # BLD: 4.76 M/CU MM (ref 4.6–6.2)
SODIUM BLD-SCNC: 136 MMOL/L (ref 135–145)
WBC # BLD: 6.9 K/CU MM (ref 4–10.5)

## 2019-03-18 PROCEDURE — 85730 THROMBOPLASTIN TIME PARTIAL: CPT

## 2019-03-18 PROCEDURE — 86850 RBC ANTIBODY SCREEN: CPT

## 2019-03-18 PROCEDURE — 86900 BLOOD TYPING SEROLOGIC ABO: CPT

## 2019-03-18 PROCEDURE — 93005 ELECTROCARDIOGRAM TRACING: CPT

## 2019-03-18 PROCEDURE — 80048 BASIC METABOLIC PNL TOTAL CA: CPT

## 2019-03-18 PROCEDURE — 36415 COLL VENOUS BLD VENIPUNCTURE: CPT

## 2019-03-18 PROCEDURE — 86901 BLOOD TYPING SEROLOGIC RH(D): CPT

## 2019-03-18 PROCEDURE — 85610 PROTHROMBIN TIME: CPT

## 2019-03-18 PROCEDURE — 85027 COMPLETE CBC AUTOMATED: CPT

## 2019-03-18 PROCEDURE — 71046 X-RAY EXAM CHEST 2 VIEWS: CPT

## 2019-03-18 RX ORDER — CEFAZOLIN SODIUM 2 G/100ML
2 INJECTION, SOLUTION INTRAVENOUS ONCE
Status: CANCELLED | OUTPATIENT
Start: 2019-03-25

## 2019-03-18 ASSESSMENT — ENCOUNTER SYMPTOMS: SHORTNESS OF BREATH: 1

## 2019-03-25 ENCOUNTER — ANESTHESIA (OUTPATIENT)
Dept: OPERATING ROOM | Age: 69
DRG: 254 | End: 2019-03-25
Payer: MEDICARE

## 2019-03-25 ENCOUNTER — HOSPITAL ENCOUNTER (INPATIENT)
Age: 69
LOS: 1 days | Discharge: HOME | DRG: 254 | End: 2019-03-26
Attending: THORACIC SURGERY (CARDIOTHORACIC VASCULAR SURGERY) | Admitting: THORACIC SURGERY (CARDIOTHORACIC VASCULAR SURGERY)
Payer: MEDICARE

## 2019-03-25 VITALS
RESPIRATION RATE: 2 BRPM | OXYGEN SATURATION: 100 % | DIASTOLIC BLOOD PRESSURE: 82 MMHG | SYSTOLIC BLOOD PRESSURE: 118 MMHG | TEMPERATURE: 97.1 F

## 2019-03-25 PROCEDURE — 041K0JL BYPASS RIGHT FEMORAL ARTERY TO POPLITEAL ARTERY WITH SYNTHETIC SUBSTITUTE, OPEN APPROACH: ICD-10-PCS | Performed by: THORACIC SURGERY (CARDIOTHORACIC VASCULAR SURGERY)

## 2019-03-25 PROCEDURE — 6360000002 HC RX W HCPCS: Performed by: NURSE ANESTHETIST, CERTIFIED REGISTERED

## 2019-03-25 PROCEDURE — 2100000000 HC CCU R&B

## 2019-03-25 PROCEDURE — 2700000000 HC OXYGEN THERAPY PER DAY

## 2019-03-25 PROCEDURE — 2500000003 HC RX 250 WO HCPCS: Performed by: NURSE ANESTHETIST, CERTIFIED REGISTERED

## 2019-03-25 PROCEDURE — 3600000014 HC SURGERY LEVEL 4 ADDTL 15MIN: Performed by: THORACIC SURGERY (CARDIOTHORACIC VASCULAR SURGERY)

## 2019-03-25 PROCEDURE — 6360000002 HC RX W HCPCS: Performed by: ANESTHESIOLOGY

## 2019-03-25 PROCEDURE — 3600000004 HC SURGERY LEVEL 4 BASE: Performed by: THORACIC SURGERY (CARDIOTHORACIC VASCULAR SURGERY)

## 2019-03-25 PROCEDURE — 94761 N-INVAS EAR/PLS OXIMETRY MLT: CPT

## 2019-03-25 PROCEDURE — 6370000000 HC RX 637 (ALT 250 FOR IP): Performed by: PHYSICIAN ASSISTANT

## 2019-03-25 PROCEDURE — 6370000000 HC RX 637 (ALT 250 FOR IP)

## 2019-03-25 PROCEDURE — 2580000003 HC RX 258: Performed by: THORACIC SURGERY (CARDIOTHORACIC VASCULAR SURGERY)

## 2019-03-25 PROCEDURE — 2780000010 HC IMPLANT OTHER: Performed by: THORACIC SURGERY (CARDIOTHORACIC VASCULAR SURGERY)

## 2019-03-25 PROCEDURE — 2580000003 HC RX 258: Performed by: PHYSICIAN ASSISTANT

## 2019-03-25 PROCEDURE — 3700000001 HC ADD 15 MINUTES (ANESTHESIA): Performed by: THORACIC SURGERY (CARDIOTHORACIC VASCULAR SURGERY)

## 2019-03-25 PROCEDURE — 6360000002 HC RX W HCPCS: Performed by: THORACIC SURGERY (CARDIOTHORACIC VASCULAR SURGERY)

## 2019-03-25 PROCEDURE — 3700000000 HC ANESTHESIA ATTENDED CARE: Performed by: THORACIC SURGERY (CARDIOTHORACIC VASCULAR SURGERY)

## 2019-03-25 PROCEDURE — 94010 BREATHING CAPACITY TEST: CPT

## 2019-03-25 PROCEDURE — C1768 GRAFT, VASCULAR: HCPCS | Performed by: THORACIC SURGERY (CARDIOTHORACIC VASCULAR SURGERY)

## 2019-03-25 PROCEDURE — 6370000000 HC RX 637 (ALT 250 FOR IP): Performed by: THORACIC SURGERY (CARDIOTHORACIC VASCULAR SURGERY)

## 2019-03-25 PROCEDURE — 2709999900 HC NON-CHARGEABLE SUPPLY: Performed by: THORACIC SURGERY (CARDIOTHORACIC VASCULAR SURGERY)

## 2019-03-25 PROCEDURE — 7100000001 HC PACU RECOVERY - ADDTL 15 MIN: Performed by: THORACIC SURGERY (CARDIOTHORACIC VASCULAR SURGERY)

## 2019-03-25 PROCEDURE — 6360000002 HC RX W HCPCS: Performed by: PHYSICIAN ASSISTANT

## 2019-03-25 PROCEDURE — 2580000003 HC RX 258: Performed by: ANESTHESIOLOGY

## 2019-03-25 PROCEDURE — 94150 VITAL CAPACITY TEST: CPT

## 2019-03-25 PROCEDURE — 7100000000 HC PACU RECOVERY - FIRST 15 MIN: Performed by: THORACIC SURGERY (CARDIOTHORACIC VASCULAR SURGERY)

## 2019-03-25 DEVICE — AGENT HEMSTAT 8ML FLX TIP MTRX + DISP SURGIFLO: Type: IMPLANTABLE DEVICE | Site: LEG | Status: FUNCTIONAL

## 2019-03-25 DEVICE — GRAFT VASC L50CM DIA8MM RNG L40CM EPTFE THN WALLED CBAS HEP: Type: IMPLANTABLE DEVICE | Site: LEG | Status: FUNCTIONAL

## 2019-03-25 RX ORDER — MORPHINE SULFATE 4 MG/ML
2 INJECTION, SOLUTION INTRAMUSCULAR; INTRAVENOUS
Status: DISCONTINUED | OUTPATIENT
Start: 2019-03-25 | End: 2019-03-26 | Stop reason: HOSPADM

## 2019-03-25 RX ORDER — ONDANSETRON 2 MG/ML
4 INJECTION INTRAMUSCULAR; INTRAVENOUS EVERY 6 HOURS PRN
Status: DISCONTINUED | OUTPATIENT
Start: 2019-03-25 | End: 2019-03-26 | Stop reason: HOSPADM

## 2019-03-25 RX ORDER — HYDROCODONE BITARTRATE AND ACETAMINOPHEN 5; 325 MG/1; MG/1
2 TABLET ORAL EVERY 4 HOURS PRN
Status: DISCONTINUED | OUTPATIENT
Start: 2019-03-25 | End: 2019-03-26 | Stop reason: HOSPADM

## 2019-03-25 RX ORDER — AMLODIPINE BESYLATE 5 MG/1
5 TABLET ORAL EVERY MORNING
Status: DISCONTINUED | OUTPATIENT
Start: 2019-03-26 | End: 2019-03-26 | Stop reason: HOSPADM

## 2019-03-25 RX ORDER — FENTANYL CITRATE 50 UG/ML
25 INJECTION, SOLUTION INTRAMUSCULAR; INTRAVENOUS EVERY 5 MIN PRN
Status: DISCONTINUED | OUTPATIENT
Start: 2019-03-25 | End: 2019-03-25 | Stop reason: HOSPADM

## 2019-03-25 RX ORDER — HEPARIN SODIUM 1000 [USP'U]/ML
INJECTION, SOLUTION INTRAVENOUS; SUBCUTANEOUS PRN
Status: DISCONTINUED | OUTPATIENT
Start: 2019-03-25 | End: 2019-03-25 | Stop reason: SDUPTHER

## 2019-03-25 RX ORDER — ROCURONIUM BROMIDE 10 MG/ML
INJECTION, SOLUTION INTRAVENOUS PRN
Status: DISCONTINUED | OUTPATIENT
Start: 2019-03-25 | End: 2019-03-25 | Stop reason: SDUPTHER

## 2019-03-25 RX ORDER — DEXAMETHASONE SODIUM PHOSPHATE 4 MG/ML
INJECTION, SOLUTION INTRA-ARTICULAR; INTRALESIONAL; INTRAMUSCULAR; INTRAVENOUS; SOFT TISSUE PRN
Status: DISCONTINUED | OUTPATIENT
Start: 2019-03-25 | End: 2019-03-25 | Stop reason: SDUPTHER

## 2019-03-25 RX ORDER — SODIUM CHLORIDE, SODIUM LACTATE, POTASSIUM CHLORIDE, CALCIUM CHLORIDE 600; 310; 30; 20 MG/100ML; MG/100ML; MG/100ML; MG/100ML
INJECTION, SOLUTION INTRAVENOUS CONTINUOUS
Status: DISCONTINUED | OUTPATIENT
Start: 2019-03-25 | End: 2019-03-25

## 2019-03-25 RX ORDER — ASPIRIN 81 MG/1
81 TABLET ORAL DAILY
Status: DISCONTINUED | OUTPATIENT
Start: 2019-03-25 | End: 2019-03-26 | Stop reason: HOSPADM

## 2019-03-25 RX ORDER — LISINOPRIL 20 MG/1
20 TABLET ORAL DAILY
Status: DISCONTINUED | OUTPATIENT
Start: 2019-03-25 | End: 2019-03-26 | Stop reason: HOSPADM

## 2019-03-25 RX ORDER — SODIUM CHLORIDE 0.9 % (FLUSH) 0.9 %
10 SYRINGE (ML) INJECTION EVERY 12 HOURS SCHEDULED
Status: DISCONTINUED | OUTPATIENT
Start: 2019-03-25 | End: 2019-03-26 | Stop reason: HOSPADM

## 2019-03-25 RX ORDER — FLUTICASONE FUROATE AND VILANTEROL 200; 25 UG/1; UG/1
1 POWDER RESPIRATORY (INHALATION) DAILY
Status: DISCONTINUED | OUTPATIENT
Start: 2019-03-25 | End: 2019-03-26 | Stop reason: HOSPADM

## 2019-03-25 RX ORDER — CEFAZOLIN SODIUM 2 G/100ML
2 INJECTION, SOLUTION INTRAVENOUS ONCE
Status: COMPLETED | OUTPATIENT
Start: 2019-03-25 | End: 2019-03-25

## 2019-03-25 RX ORDER — CARVEDILOL 12.5 MG/1
12.5 TABLET ORAL 2 TIMES DAILY WITH MEALS
Status: DISCONTINUED | OUTPATIENT
Start: 2019-03-25 | End: 2019-03-26 | Stop reason: HOSPADM

## 2019-03-25 RX ORDER — HYDROCODONE BITARTRATE AND ACETAMINOPHEN 5; 325 MG/1; MG/1
1 TABLET ORAL EVERY 4 HOURS PRN
Status: DISCONTINUED | OUTPATIENT
Start: 2019-03-25 | End: 2019-03-26 | Stop reason: HOSPADM

## 2019-03-25 RX ORDER — ONDANSETRON 2 MG/ML
4 INJECTION INTRAMUSCULAR; INTRAVENOUS
Status: DISCONTINUED | OUTPATIENT
Start: 2019-03-25 | End: 2019-03-25 | Stop reason: HOSPADM

## 2019-03-25 RX ORDER — LABETALOL HYDROCHLORIDE 5 MG/ML
5 INJECTION, SOLUTION INTRAVENOUS EVERY 10 MIN PRN
Status: DISCONTINUED | OUTPATIENT
Start: 2019-03-25 | End: 2019-03-25 | Stop reason: HOSPADM

## 2019-03-25 RX ORDER — PROPOFOL 10 MG/ML
INJECTION, EMULSION INTRAVENOUS PRN
Status: DISCONTINUED | OUTPATIENT
Start: 2019-03-25 | End: 2019-03-25 | Stop reason: SDUPTHER

## 2019-03-25 RX ORDER — ONDANSETRON 2 MG/ML
INJECTION INTRAMUSCULAR; INTRAVENOUS PRN
Status: DISCONTINUED | OUTPATIENT
Start: 2019-03-25 | End: 2019-03-25 | Stop reason: SDUPTHER

## 2019-03-25 RX ORDER — SODIUM CHLORIDE 450 MG/100ML
INJECTION, SOLUTION INTRAVENOUS CONTINUOUS
Status: DISCONTINUED | OUTPATIENT
Start: 2019-03-25 | End: 2019-03-25

## 2019-03-25 RX ORDER — PROTAMINE SULFATE 10 MG/ML
INJECTION, SOLUTION INTRAVENOUS PRN
Status: DISCONTINUED | OUTPATIENT
Start: 2019-03-25 | End: 2019-03-25 | Stop reason: SDUPTHER

## 2019-03-25 RX ORDER — CEFAZOLIN SODIUM 2 G/50ML
2 SOLUTION INTRAVENOUS EVERY 8 HOURS
Status: COMPLETED | OUTPATIENT
Start: 2019-03-25 | End: 2019-03-25

## 2019-03-25 RX ORDER — EPHEDRINE SULFATE 50 MG/ML
INJECTION, SOLUTION INTRAVENOUS PRN
Status: DISCONTINUED | OUTPATIENT
Start: 2019-03-25 | End: 2019-03-25 | Stop reason: SDUPTHER

## 2019-03-25 RX ORDER — FLUTICASONE FUROATE AND VILANTEROL 100; 25 UG/1; UG/1
1 POWDER RESPIRATORY (INHALATION) DAILY
Status: DISCONTINUED | OUTPATIENT
Start: 2019-03-25 | End: 2019-03-25 | Stop reason: ALTCHOICE

## 2019-03-25 RX ORDER — HYDROMORPHONE HCL 110MG/55ML
0.5 PATIENT CONTROLLED ANALGESIA SYRINGE INTRAVENOUS EVERY 5 MIN PRN
Status: DISCONTINUED | OUTPATIENT
Start: 2019-03-25 | End: 2019-03-25 | Stop reason: HOSPADM

## 2019-03-25 RX ORDER — LABETALOL HYDROCHLORIDE 5 MG/ML
10 INJECTION, SOLUTION INTRAVENOUS
Status: DISCONTINUED | OUTPATIENT
Start: 2019-03-25 | End: 2019-03-26 | Stop reason: HOSPADM

## 2019-03-25 RX ORDER — HYDRALAZINE HYDROCHLORIDE 20 MG/ML
5 INJECTION INTRAMUSCULAR; INTRAVENOUS EVERY 10 MIN PRN
Status: DISCONTINUED | OUTPATIENT
Start: 2019-03-25 | End: 2019-03-25 | Stop reason: HOSPADM

## 2019-03-25 RX ORDER — ACETAMINOPHEN 325 MG/1
650 TABLET ORAL EVERY 4 HOURS PRN
Status: DISCONTINUED | OUTPATIENT
Start: 2019-03-25 | End: 2019-03-26 | Stop reason: HOSPADM

## 2019-03-25 RX ORDER — SODIUM CHLORIDE 0.9 % (FLUSH) 0.9 %
10 SYRINGE (ML) INJECTION PRN
Status: DISCONTINUED | OUTPATIENT
Start: 2019-03-25 | End: 2019-03-26 | Stop reason: HOSPADM

## 2019-03-25 RX ADMIN — ASPIRIN 81 MG: 81 TABLET, COATED ORAL at 12:55

## 2019-03-25 RX ADMIN — SUGAMMADEX 100 MG: 100 INJECTION, SOLUTION INTRAVENOUS at 09:45

## 2019-03-25 RX ADMIN — SODIUM CHLORIDE, POTASSIUM CHLORIDE, SODIUM LACTATE AND CALCIUM CHLORIDE: 600; 310; 30; 20 INJECTION, SOLUTION INTRAVENOUS at 06:42

## 2019-03-25 RX ADMIN — CEFAZOLIN SODIUM 2 G: 2 SOLUTION INTRAVENOUS at 13:35

## 2019-03-25 RX ADMIN — LISINOPRIL 20 MG: 20 TABLET ORAL at 12:55

## 2019-03-25 RX ADMIN — PROTAMINE SULFATE 25 MG: 10 INJECTION, SOLUTION INTRAVENOUS at 09:40

## 2019-03-25 RX ADMIN — SUGAMMADEX 100 MG: 100 INJECTION, SOLUTION INTRAVENOUS at 09:47

## 2019-03-25 RX ADMIN — CEFAZOLIN SODIUM 2 G: 2 INJECTION, SOLUTION INTRAVENOUS at 07:54

## 2019-03-25 RX ADMIN — EPHEDRINE SULFATE 5 MG: 50 INJECTION, SOLUTION INTRAMUSCULAR; INTRAVENOUS; SUBCUTANEOUS at 08:53

## 2019-03-25 RX ADMIN — HEPARIN SODIUM 2000 UNITS: 1000 INJECTION, SOLUTION INTRAVENOUS; SUBCUTANEOUS at 09:25

## 2019-03-25 RX ADMIN — DEXAMETHASONE SODIUM PHOSPHATE 8 MG: 4 INJECTION, SOLUTION INTRAMUSCULAR; INTRAVENOUS at 07:36

## 2019-03-25 RX ADMIN — EPHEDRINE SULFATE 2.5 MG: 50 INJECTION, SOLUTION INTRAMUSCULAR; INTRAVENOUS; SUBCUTANEOUS at 08:47

## 2019-03-25 RX ADMIN — FENTANYL CITRATE 100 MCG: 50 INJECTION INTRAMUSCULAR; INTRAVENOUS at 07:36

## 2019-03-25 RX ADMIN — ACETAMINOPHEN 650 MG: 325 TABLET ORAL at 13:05

## 2019-03-25 RX ADMIN — ROCURONIUM BROMIDE 20 MG: 50 INJECTION, SOLUTION INTRAVENOUS at 08:42

## 2019-03-25 RX ADMIN — LIDOCAINE HYDROCHLORIDE 100 MG: 20 INJECTION, SOLUTION INTRAVENOUS at 07:36

## 2019-03-25 RX ADMIN — ROCURONIUM BROMIDE 50 MG: 50 INJECTION, SOLUTION INTRAVENOUS at 07:36

## 2019-03-25 RX ADMIN — PROPOFOL 150 MG: 10 INJECTION, EMULSION INTRAVENOUS at 07:36

## 2019-03-25 RX ADMIN — HYDROMORPHONE HYDROCHLORIDE 0.5 MG: 2 INJECTION, SOLUTION INTRAMUSCULAR; INTRAVENOUS; SUBCUTANEOUS at 10:53

## 2019-03-25 RX ADMIN — EPHEDRINE SULFATE 2.5 MG: 50 INJECTION, SOLUTION INTRAMUSCULAR; INTRAVENOUS; SUBCUTANEOUS at 08:54

## 2019-03-25 RX ADMIN — CEFAZOLIN SODIUM 2 G: 2 SOLUTION INTRAVENOUS at 22:25

## 2019-03-25 RX ADMIN — SODIUM CHLORIDE, PRESERVATIVE FREE 10 ML: 5 INJECTION INTRAVENOUS at 20:44

## 2019-03-25 RX ADMIN — HEPARIN SODIUM 5000 UNITS: 1000 INJECTION, SOLUTION INTRAVENOUS; SUBCUTANEOUS at 08:37

## 2019-03-25 RX ADMIN — PHENYLEPHRINE HYDROCHLORIDE 100 MCG: 10 INJECTION INTRAVENOUS at 07:48

## 2019-03-25 RX ADMIN — PHENYLEPHRINE HYDROCHLORIDE 100 MCG: 10 INJECTION INTRAVENOUS at 08:05

## 2019-03-25 RX ADMIN — EPHEDRINE SULFATE 5 MG: 50 INJECTION, SOLUTION INTRAMUSCULAR; INTRAVENOUS; SUBCUTANEOUS at 08:26

## 2019-03-25 RX ADMIN — ONDANSETRON 4 MG: 2 INJECTION INTRAMUSCULAR; INTRAVENOUS at 09:44

## 2019-03-25 RX ADMIN — ACETAMINOPHEN 650 MG: 325 TABLET ORAL at 22:34

## 2019-03-25 RX ADMIN — CARVEDILOL 12.5 MG: 12.5 TABLET, FILM COATED ORAL at 17:31

## 2019-03-25 ASSESSMENT — PULMONARY FUNCTION TESTS
PIF_VALUE: 15
PIF_VALUE: 16
PIF_VALUE: 15
PIF_VALUE: 16
PIF_VALUE: 19
PIF_VALUE: 1
PIF_VALUE: 16
PIF_VALUE: 15
PIF_VALUE: 16
PIF_VALUE: 16
PIF_VALUE: 15
PIF_VALUE: 16
PIF_VALUE: 15
PIF_VALUE: 14
PIF_VALUE: 16
PIF_VALUE: 35
PIF_VALUE: 16
PIF_VALUE: 16
PIF_VALUE: 2
PIF_VALUE: 11
PIF_VALUE: 17
PIF_VALUE: 16
PIF_VALUE: 17
PIF_VALUE: 19
PIF_VALUE: 16
PIF_VALUE: 2
PIF_VALUE: 16
PIF_VALUE: 16
PIF_VALUE: 1
PIF_VALUE: 16
PIF_VALUE: 16
PIF_VALUE: 17
PIF_VALUE: 16
PIF_VALUE: 19
PIF_VALUE: 16
PIF_VALUE: 2
PIF_VALUE: 19
PIF_VALUE: 15
PIF_VALUE: 16
PIF_VALUE: 15
PIF_VALUE: 16
PIF_VALUE: 14
PIF_VALUE: 1
PIF_VALUE: 14
PIF_VALUE: 16
PIF_VALUE: 11
PIF_VALUE: 8
PIF_VALUE: 16
PIF_VALUE: 21
PIF_VALUE: 16
PIF_VALUE: 16
PIF_VALUE: 22
PIF_VALUE: 20
PIF_VALUE: 18
PIF_VALUE: 19
PIF_VALUE: 16
PIF_VALUE: 15
PIF_VALUE: 16
PIF_VALUE: 42
PIF_VALUE: 21
PIF_VALUE: 16
PIF_VALUE: 14
PIF_VALUE: 21
PIF_VALUE: 16
PIF_VALUE: 1
PIF_VALUE: 16
PIF_VALUE: 17
PIF_VALUE: 16
PIF_VALUE: 19
PIF_VALUE: 16
PIF_VALUE: 16
PIF_VALUE: 1
PIF_VALUE: 9
PIF_VALUE: 16
PIF_VALUE: 18
PIF_VALUE: 16
PIF_VALUE: 2
PIF_VALUE: 16
PIF_VALUE: 19
PIF_VALUE: 16
PIF_VALUE: 1
PIF_VALUE: 16

## 2019-03-25 ASSESSMENT — PAIN SCALES - GENERAL
PAINLEVEL_OUTOF10: 1
PAINLEVEL_OUTOF10: 3
PAINLEVEL_OUTOF10: 2
PAINLEVEL_OUTOF10: 7
PAINLEVEL_OUTOF10: 0

## 2019-03-25 ASSESSMENT — PAIN DESCRIPTION - ORIENTATION
ORIENTATION: RIGHT
ORIENTATION: RIGHT

## 2019-03-25 ASSESSMENT — PAIN DESCRIPTION - PAIN TYPE
TYPE: ACUTE PAIN;SURGICAL PAIN
TYPE: ACUTE PAIN;SURGICAL PAIN

## 2019-03-25 ASSESSMENT — PAIN DESCRIPTION - LOCATION
LOCATION: GROIN;LEG
LOCATION: GROIN;LEG

## 2019-03-25 ASSESSMENT — PAIN DESCRIPTION - FREQUENCY
FREQUENCY: CONTINUOUS
FREQUENCY: CONTINUOUS

## 2019-03-25 ASSESSMENT — PAIN DESCRIPTION - DESCRIPTORS
DESCRIPTORS: ACHING;SORE
DESCRIPTORS: ACHING;SORE

## 2019-03-25 ASSESSMENT — PAIN - FUNCTIONAL ASSESSMENT
PAIN_FUNCTIONAL_ASSESSMENT: ACTIVITIES ARE NOT PREVENTED
PAIN_FUNCTIONAL_ASSESSMENT: 0-10
PAIN_FUNCTIONAL_ASSESSMENT: ACTIVITIES ARE NOT PREVENTED

## 2019-03-25 ASSESSMENT — PAIN DESCRIPTION - ONSET
ONSET: ON-GOING
ONSET: ON-GOING

## 2019-03-25 ASSESSMENT — PAIN DESCRIPTION - PROGRESSION
CLINICAL_PROGRESSION: GRADUALLY WORSENING
CLINICAL_PROGRESSION: NOT CHANGED

## 2019-03-25 NOTE — BRIEF OP NOTE
Brief Postoperative Note  ______________________________________________________________    Patient: Titus Murray  YOB: 1950  MRN: [id number]  Date of Procedure: 3/25/2019    Pre-Op Diagnosis: PVD    Post-Op Diagnosis: Same       Procedure(s):  RIGHT FEMORAL POPLITEAL BYPASS    Anesthesia: General    Surgeon(s):  MD Shell Ordonez MD    Assistant: Lea Ramirez PA-C    Estimated Blood Loss (mL): 50    Complications: None    Specimens:   * No specimens in log *    Implants:  Implant Name Type Inv.  Item Serial No.  Lot No. LRB No. Used   GRAFT VASC PROPATEN STRTCH THN WALLED [id number] Vascular/Graft/Patch/Filter GRAFT VASC PROPATEN STRTCH THN WALLED [id number]   [employer] [id number] Right 1   IMPL SEALANT SURGIFLO HEMOSTAT MATRIC Bone/Graft/Tissue/Human/Synth IMPL SEALANT SURGIFLO HEMOSTAT MATRIC  [place]: [facility] [id number] Right 1         Drains:   Urethral Catheter 16 fr (Active)   Catheter Indications Perioperative use in selected surgeries including but not limited to urologic, pelvic or need for intraoperative monitoring of urinary output due to prolonged surgery, large volume infusion or need for diuretic therapy in surgery 3/25/2019 10:12 AM   Site Assessment No urethral drainage 3/25/2019 10:12 AM   Urine Color Yellow 3/25/2019 10:12 AM   Urine Appearance Clear 3/25/2019 10:12 AM       Findings: calcified common femoral artery    [id number]    Lea Ramirez PA-C  Date: 3/25/2019  Time: 10:45 AM

## 2019-03-25 NOTE — PROGRESS NOTES
IVF dc'd due to verbal order of Dr. Jessa Ramirez since pt is eating and drinking well. Probable home tomorrow. Minaya removed also. Pt ambulated to bathroom without any difficulty.

## 2019-03-25 NOTE — PROGRESS NOTES
1012- Pt arrived from OR and placed on all PACU monitoring devices. Pt drowsy but awakens to name and follows simple commands. Respirations even and unlabored. Right groin and right inner leg dressings dry and intact. BPPP. VSS will continue to monitor. 1100- Report called to Kirill Lincoln, preparing pt for transfer. No family in waiting room at this time. 1115- Pt to room 2003, Kaia Sarmiento RN at bedside to assume primary care. Right leg dressings viewed upon handoff.

## 2019-03-26 VITALS
TEMPERATURE: 98.5 F | BODY MASS INDEX: 30.24 KG/M2 | WEIGHT: 211.2 LBS | HEIGHT: 70 IN | RESPIRATION RATE: 18 BRPM | HEART RATE: 66 BPM | SYSTOLIC BLOOD PRESSURE: 129 MMHG | OXYGEN SATURATION: 97 % | DIASTOLIC BLOOD PRESSURE: 70 MMHG

## 2019-03-26 PROBLEM — I73.9 PVD (PERIPHERAL VASCULAR DISEASE) (HCC): Status: ACTIVE | Noted: 2019-03-26

## 2019-03-26 LAB
ANION GAP SERPL CALCULATED.3IONS-SCNC: 9 MMOL/L (ref 4–16)
BASOPHILS ABSOLUTE: 0 K/CU MM
BASOPHILS RELATIVE PERCENT: 0.1 % (ref 0–1)
BUN BLDV-MCNC: 22 MG/DL (ref 6–23)
CALCIUM SERPL-MCNC: 8.6 MG/DL (ref 8.3–10.6)
CHLORIDE BLD-SCNC: 98 MMOL/L (ref 99–110)
CO2: 24 MMOL/L (ref 21–32)
CREAT SERPL-MCNC: 1 MG/DL (ref 0.9–1.3)
DIFFERENTIAL TYPE: ABNORMAL
EOSINOPHILS ABSOLUTE: 0 K/CU MM
EOSINOPHILS RELATIVE PERCENT: 0 % (ref 0–3)
GFR AFRICAN AMERICAN: >60 ML/MIN/1.73M2
GFR NON-AFRICAN AMERICAN: >60 ML/MIN/1.73M2
GLUCOSE BLD-MCNC: 134 MG/DL (ref 70–99)
HCT VFR BLD CALC: 43.1 % (ref 42–52)
HEMOGLOBIN: 14 GM/DL (ref 13.5–18)
IMMATURE NEUTROPHIL %: 0.6 % (ref 0–0.43)
LYMPHOCYTES ABSOLUTE: 1.4 K/CU MM
LYMPHOCYTES RELATIVE PERCENT: 10.1 % (ref 24–44)
MCH RBC QN AUTO: 32.3 PG (ref 27–31)
MCHC RBC AUTO-ENTMCNC: 32.5 % (ref 32–36)
MCV RBC AUTO: 99.3 FL (ref 78–100)
MONOCYTES ABSOLUTE: 1.1 K/CU MM
MONOCYTES RELATIVE PERCENT: 8.1 % (ref 0–4)
NUCLEATED RBC %: 0 %
PDW BLD-RTO: 12.8 % (ref 11.7–14.9)
PLATELET # BLD: 228 K/CU MM (ref 140–440)
PMV BLD AUTO: 8.8 FL (ref 7.5–11.1)
POTASSIUM SERPL-SCNC: 4.3 MMOL/L (ref 3.5–5.1)
RBC # BLD: 4.34 M/CU MM (ref 4.6–6.2)
SEGMENTED NEUTROPHILS ABSOLUTE COUNT: 11.1 K/CU MM
SEGMENTED NEUTROPHILS RELATIVE PERCENT: 81.1 % (ref 36–66)
SODIUM BLD-SCNC: 131 MMOL/L (ref 135–145)
TOTAL IMMATURE NEUTOROPHIL: 0.08 K/CU MM
TOTAL NUCLEATED RBC: 0 K/CU MM
WBC # BLD: 13.7 K/CU MM (ref 4–10.5)

## 2019-03-26 PROCEDURE — 94150 VITAL CAPACITY TEST: CPT

## 2019-03-26 PROCEDURE — 94761 N-INVAS EAR/PLS OXIMETRY MLT: CPT

## 2019-03-26 PROCEDURE — 85025 COMPLETE CBC W/AUTO DIFF WBC: CPT

## 2019-03-26 PROCEDURE — 6370000000 HC RX 637 (ALT 250 FOR IP): Performed by: PHYSICIAN ASSISTANT

## 2019-03-26 PROCEDURE — 80048 BASIC METABOLIC PNL TOTAL CA: CPT

## 2019-03-26 PROCEDURE — 2580000003 HC RX 258: Performed by: PHYSICIAN ASSISTANT

## 2019-03-26 RX ADMIN — SODIUM CHLORIDE, PRESERVATIVE FREE 10 ML: 5 INJECTION INTRAVENOUS at 08:35

## 2019-03-26 RX ADMIN — AMLODIPINE BESYLATE 5 MG: 5 TABLET ORAL at 08:34

## 2019-03-26 RX ADMIN — ACETAMINOPHEN 650 MG: 325 TABLET ORAL at 08:35

## 2019-03-26 RX ADMIN — CARVEDILOL 12.5 MG: 12.5 TABLET, FILM COATED ORAL at 08:34

## 2019-03-26 RX ADMIN — ASPIRIN 81 MG: 81 TABLET, COATED ORAL at 08:35

## 2019-03-26 RX ADMIN — LISINOPRIL 20 MG: 20 TABLET ORAL at 08:34

## 2019-03-26 ASSESSMENT — PAIN DESCRIPTION - ONSET: ONSET: ON-GOING

## 2019-03-26 ASSESSMENT — PAIN DESCRIPTION - ORIENTATION: ORIENTATION: RIGHT

## 2019-03-26 ASSESSMENT — PAIN SCALES - GENERAL
PAINLEVEL_OUTOF10: 0
PAINLEVEL_OUTOF10: 2
PAINLEVEL_OUTOF10: 2

## 2019-03-26 ASSESSMENT — PAIN DESCRIPTION - FREQUENCY: FREQUENCY: CONTINUOUS

## 2019-03-26 ASSESSMENT — PAIN DESCRIPTION - DESCRIPTORS: DESCRIPTORS: ACHING;SORE

## 2019-03-26 ASSESSMENT — PAIN DESCRIPTION - PAIN TYPE: TYPE: ACUTE PAIN;SURGICAL PAIN

## 2019-03-26 ASSESSMENT — PAIN DESCRIPTION - PROGRESSION: CLINICAL_PROGRESSION: NOT CHANGED

## 2019-03-26 ASSESSMENT — PAIN DESCRIPTION - LOCATION: LOCATION: GROIN;LEG

## 2019-03-26 NOTE — DISCHARGE SUMMARY
Discharge Summary     Patient ID  Janneth Leavitt   1950  [id number]      Primary Care Doctor:  Zohra Guzman MD    Admit date: 3/25/2019   Discharge date: 3/26/2019      Admitting Physician: Windy Mesa MD   Discharge Physician: Leelee Don PA-C    Discharge Diagnoses: Active Hospital Problems    Diagnosis Date Noted    PVD (peripheral vascular disease) ([address] [I73.9] 03/26/2019     Discharged Condition: stable. Hospital Course:  Patient was having R leg claudication. He was found to have R SFA occlusion. Underwent surgery of R fem-pop bypass after discussion and preparation. Post op ; monitored rhythm, O2 sat, I/O, Labs, CXRs serially  Neuro status , BP and vital signs monitored  Pt tolerated surgery well. Started on diet and activity. At discharge, pt ambulating. Pain well controlled. Special concerns: none  Further plans after discharge: f/u with Dr. Natalio Turk in 2 weeks    VS at discharge   Vitals:    03/26/19 1002   BP: 129/70   Pulse: 64   Resp: 15   Temp:    SpO2: 97%       Consults: none    Disposition: home    Patient Instructions:      Medication List      CHANGE how you take these medications    clopidogrel 75 MG tablet  Commonly known as:  PLAVIX  TAKE ONE TABLET BY MOUTH DAILY  What changed:  See the new instructions.         CONTINUE taking these medications    aspirin 81 MG tablet     BREO ELLIPTA 200-25 MCG/INH Aepb  Generic drug:  Fluticasone Furoate-Vilanterol     carvedilol 12.5 MG tablet  Commonly known as:  COREG  Take 1 tablet by mouth 2 times daily (with meals)     Evolocumab 140 MG/ML Soaj  Inject 140 mg into the skin every 14 days     lisinopril 20 MG tablet  Commonly known as:  PRINIVIL;ZESTRIL  Take 1 tablet by mouth daily        ASK your doctor about these medications    amLODIPine 5 MG tablet  Commonly known as:  NORVASC          Activity: activity as tolerated and no lifting, Driving, or Strenuous exercise until seen by physician in office  Diet: cardiac diet  Wound Care: as directed    Follow-up as directed at discharge    Signed: Donnia Habermann    Time spent on discharge 35 minutes

## 2019-03-26 NOTE — PROGRESS NOTES
AVS printed and reviewed with pt. No questions noted. Spouse at bedside. Pt deferring bathing until goes home.

## 2019-03-26 NOTE — OP NOTE
[address] [place] [address], [address] [place] [address]                                OPERATIVE REPORT    PATIENT NAME: Nils Salamanca               :        1950  MED REC NO:   [id number]                          ROOM:         ACCOUNT NO:   [de-identified]                           ADMIT DATE: 2019  PROVIDER:     Raford Landau, MD    DATE OF PROCEDURE:  2019    PREOPERATIVE DIAGNOSIS:  Peripheral vascular disease. POSTOPERATIVE DIAGNOSIS:  Peripheral vascular disease. PROCEDURE PERFORMED:  Right femoral to above-the-knee popliteal bypass  using an 8 mm Propaten graft. SURGEON:  Raford Landau, MD    ASSISTANT SURGEON:  Shelly Mendez MD    ASSISTANT:  Justus Mendoza PA-C. PREOP:  This is a 76-year-old male who has significant claudication  symptoms on his right side. He underwent catheterization by Cardiology,  showed a long segment occlusion of the SFA and was referred for femoral  popliteal bypass. Risks and benefits of the procedure were discussed in  detail with the patient. He understood and agreed to proceed. FINDINGS:  There was an area of calcification in the common femoral  artery. Part of this was removed. The distal vessel was excellent and  there was good pulse at the completion of the procedure. DESCRIPTION OF PROCEDURE:  The patient was identified, brought to the  operating room, laid in supine position. After successful induction of  general anesthesia, the patient was intubated by the Anesthesia staff,  prepped and draped in normal sterile fashion. Prior to incision, a  time-out was performed. Antibiotics were given. We first began by  dissecting out the patient's previous vein harvest site in the right  inner thigh adjacent to the knee. We split the muscles and entered into  the popliteal fossa, identified the popliteal artery.   The artery was  encircled and was dissected both proximally and distally. At the same  time, right femoral artery was dissected out and encircled both  proximally and distally. The patient was heparinized. We then opened  the distal popliteal artery. Arteriotomy was extended both proximally  and distally using micro Tompkins scissors. End-to-side anastomosis was  created between the 8 mm Propaten graft and the popliteal artery using a  6-0 Prolene suture in a running fashion. At the completion of  anastomosis, there was no significant bleeding and there was excellent  back flow into the graft. We then tunneled the graft using a tunneling  device to the femoral dissection site. We clamped the artery both  proximally and distally and entered the artery. End-to-side anastomosis  was created between the femoral artery and the Propaten graft using a  6-0 Prolene suture in a running fashion. At the completion of  anastomosis, there was excellent flow in the graft; however, we felt  that the graft was too long; therefore, re-clamped the graft and the  femoral artery and once again re-did the anastomosis and transecting a  portion of the graft to size it better. Once we looked back into the  femoral, we did note calcification which we removed bluntly. No areas  of dissection were noted. Re-anastomosis was completed using 6-0  Prolene suture. At the completion of this anastomosis, there was  excellent flow down the graft. Pulses were palpable and were confirmed  using Doppler in the DP and PT. Both wounds were irrigated copiously  and both incisions were closed in multilayer fashions. Sponge count,  needle count, and instrument count were correct. The patient tolerated  the procedure well. Transport to the PACU in stable condition.         Rickey Argueta MD    D: 03/25/2019 15:05:59       T: 03/25/2019 19:50:20     [email or web address]  Job#: [id number]     Doc#: [id number]    CC:

## 2019-04-05 ENCOUNTER — OFFICE VISIT (OUTPATIENT)
Dept: CARDIOLOGY CLINIC | Age: 69
End: 2019-04-05
Payer: MEDICARE

## 2019-04-05 VITALS
BODY MASS INDEX: 31.99 KG/M2 | SYSTOLIC BLOOD PRESSURE: 130 MMHG | OXYGEN SATURATION: 98 % | HEART RATE: 66 BPM | WEIGHT: 216 LBS | DIASTOLIC BLOOD PRESSURE: 76 MMHG | HEIGHT: 69 IN

## 2019-04-05 DIAGNOSIS — I73.9 CLAUDICATION (HCC): Primary | ICD-10-CM

## 2019-04-05 PROCEDURE — 1036F TOBACCO NON-USER: CPT | Performed by: INTERNAL MEDICINE

## 2019-04-05 PROCEDURE — G8417 CALC BMI ABV UP PARAM F/U: HCPCS | Performed by: INTERNAL MEDICINE

## 2019-04-05 PROCEDURE — G8427 DOCREV CUR MEDS BY ELIG CLIN: HCPCS | Performed by: INTERNAL MEDICINE

## 2019-04-05 PROCEDURE — 1123F ACP DISCUSS/DSCN MKR DOCD: CPT | Performed by: INTERNAL MEDICINE

## 2019-04-05 PROCEDURE — 3017F COLORECTAL CA SCREEN DOC REV: CPT | Performed by: INTERNAL MEDICINE

## 2019-04-05 PROCEDURE — G8598 ASA/ANTIPLAT THER USED: HCPCS | Performed by: INTERNAL MEDICINE

## 2019-04-05 PROCEDURE — 4040F PNEUMOC VAC/ADMIN/RCVD: CPT | Performed by: INTERNAL MEDICINE

## 2019-04-05 PROCEDURE — 1111F DSCHRG MED/CURRENT MED MERGE: CPT | Performed by: INTERNAL MEDICINE

## 2019-04-05 PROCEDURE — 99214 OFFICE O/P EST MOD 30 MIN: CPT | Performed by: INTERNAL MEDICINE

## 2019-04-05 NOTE — PROGRESS NOTES
CARDIOLOGY NOTE      4/5/2019    RE: Aiden Porter  (1950)                               TO:  Dr. Georgi Singh MD            CHIEF Risa Jenkins is a 78 y.o. male who was seen today for management of  PVD                                    HPI:   Patient is here for    - Coronary artery disease, does not have chest pain. Patient is  compliant with prescribed medicines. - Hypertension,is  well controlled, pt is  compliant with medicines  - Hyperlipidimea, lipids are in acceptable range.  Pt  is  compliant with medicines  - PVD  SP PTA and  Surgery   Feels better                The patient does not have cardiac complaints    Aiden Porter has the following history recorded in care path:  Patient Active Problem List    Diagnosis Date Noted    PVD (peripheral vascular disease) ([facility] [address] 03/26/2019    PVD (peripheral vascular disease) with claudication ([facility] [address] [facility]     CAD (coronary atherosclerotic disease) 06/29/2018    Claudication ([facility] [address] 05/19/2016    H/O cardiovascular stress test 05/29/2014    S/P CABG x 4 05/05/2014    History of percutaneous left heart catheterization 05/03/2014    Hypertension 05/02/2014    Tobacco abuse 05/02/2014    NSTEMI (non-ST elevated myocardial infarction) ([facility] [address] 05/02/2014     Current Outpatient Medications   Medication Sig Dispense Refill    Evolocumab 140 MG/ML SOAJ Inject 140 mg into the skin every 14 days 2 pen 5    Fluticasone Furoate-Vilanterol (BREO ELLIPTA) 200-25 MCG/INH AEPB Inhale into the lungs as needed       clopidogrel (PLAVIX) 75 MG tablet TAKE ONE TABLET BY MOUTH DAILY (Patient taking differently: TAKE ONE TABLET BY MOUTH DAILY 1/2 tab daily) 90 tablet 3    amLODIPine (NORVASC) 5 MG tablet Take 5 mg by mouth every morning       lisinopril (PRINIVIL;ZESTRIL) 20 MG tablet Take 1 tablet by mouth daily 90 tablet 3    carvedilol (COREG) 12.5 MG tablet Take 1 tablet by mouth 2 times daily (with meals) 180 tablet 3    aspirin 81 MG tablet Take 81 mg by mouth nightly Over The Counter       No current facility-administered medications for this visit.       Allergies: Statins  Past Medical History:   Diagnosis Date    Acid reflux     Basal Cell Skin Cancer Excised Nose 1992    Basal Cell Skin Cancer Excised Nose    CAD (coronary artery disease)     Sees Dr. Violeta Alva H/O cardiovascular stress test 5/29/2014    cardiolite-normal, no ischemia, EF 60%    H/O cardiovascular stress test 1/18/2015    cardiolite-normal, EF51%    H/O cardiovascular stress test 02/12/2018    EF60% normal study    H/O Doppler ultrasound 6/1/2016    Lower Ext Arterial- normal study, mild stenosis in RLE    H/O echocardiogram 5/28/2014    EF35-45% mild MR, mild AV calcification    Heart attack ([address] 2014    History of cardiac cath 02/12/2019    PATENT RENAL ARTERIES DISTAL ABD AORTA ATHEROSCLEROTIC, ANEYRYSMAL, RCIA PARENT, REIA  PATENT, RT SFA OCCLUDED, DISTALLY RECONSTITUTES, 3 V RUNOFF,  LT MARIA ISABEL PATENT, CHRIS 70% TO 0%,  LT SFA 80% TO 0% WITH ATHRECTOMY TO PTA, LT POP PATENT,  3 V run off                        History of percutaneous left heart catheterization 5/3/2014    RCA 99% distal stenosis EF55%    History of Peripheral Angiogram 03/07/2019    RCIA PATENT, REIA CALCIFIED 70% STENOSIS, RSFA OCCLUDED NO STUMP, PFA  PATENT FILL DISTAL SFA WITH COLLATERLS, RT POP PATENT, 3 V RUN OFF    Hyperlipidemia     Hypertension     S/P CABG x 4 2014    vein graft diagonal coronary artery,vein graft distal RCA,vein graft obtuse marginal coronary artery and left internal mammary artery to the left anterior descending    Shortness of breath on exertion     Teeth missing     Upper And Lower    Wears dentures     Full Upper    Wears glasses      Past Surgical History:   Procedure Laterality Date    CARDIAC SURGERY  2014    CABG (4 Bypasses)    COLONOSCOPY  Last Done In 2019    Polyps Removed In Past    DENTAL SURGERY      Teeth Extracted In Past    FEMORAL BYPASS Right 3/25/2019    RIGHT FEMORAL POPLITEAL BYPASS performed by Malachi Amin MD at [address] [place] Left 2019    \"Stent\"    SKIN CANCER EXCISION      Basal Cell Skin Cancer Excised Nose    TONSILLECTOMY AND ADENOIDECTOMY  s      As reviewed   Family History   Adopted: Yes   Problem Relation Age of Onset    Heart Disease Mother      Social History     Tobacco Use    Smoking status: Former Smoker     Packs/day: 1.00     Years: 55.00     Pack years: 55.00     Types: Cigarettes, Pipe, Cigars     Start date:      Last attempt to quit: 2014     Years since quittin.2    Smokeless tobacco: Former User     Types: [address] date:    Substance Use Topics    Alcohol use: No      Review of Systems:    Constitutional: Negative for diaphoresis and fatigue  Psychological:Negative for anxiety or depression  HENT: Negative for headaches, nasal congestion, sinus pain or vertigo  Eyes: Negative for visual disturbance. Endocrine: Negative for polydipsia/polyuria  Respiratory: Negative for shortness of breath  Cardiovascular: Negative for chest pain, dyspnea on exertion, claudication, edema, irregular heartbeat, murmur, palpitations or shortness of breath  Gastrointestinal: Negative for abdominal pain or heartburn  Genito-Urinary: Negative for urinary frequency/urgency  Musculoskeletal: Negative for muscle pain, muscular weakness, negative for pain in arm and leg or swelling in foot and leg  Neurological: Negative for dizziness, headaches, memory loss, numbness/tingling, visual changes, syncope  Dermatological: Negative for rash    Objective:  /76 (Site: Right Upper Arm, Position: Sitting, Cuff Size: Large Adult)   Pulse 66   Ht 5' 9.49\" (1.765 m)   Wt 216 lb (98 kg)   SpO2 98%   BMI 31.45 kg/m²   Wt Readings from Last 3 Encounters:   19 216 lb (98 kg)   19 211 lb 3.2 oz (95.8 kg)   19 213 lb 6 oz (96.8 kg)     Body mass index is 31.45 kg/m².   GENERAL - Alert, oriented, pleasant, in no apparent distress. EYES: No jaundice, no conjunctival pallor. SKIN: It is warm & dry. No rashes. No Echhymosis    HEENT - No clinically significant abnormalities seen. Neck - Supple. No jugular venous distention noted. No carotid bruits. Cardiovascular - Normal S1 and S2 without obvious murmur or gallop. Extremities - No cyanosis, clubbing, or significant edema. Pulmonary - No respiratory distress. No wheezes or rales. Abdomen - No masses, tenderness, or organomegaly. Musculoskeletal - No significant edema. No joint deformities. No muscle wasting. Neurologic - Cranial nerves II through XII are grossly intact. There were no gross focal neurologic abnormalities. Lab Review   No results found for: CKTOTAL, CKMB, CKMBINDEX, TROPONINT  BNP:    Lab Results   Component Value Date    BNP 22 05/02/2014     PT/INR:    Lab Results   Component Value Date    INR 0.97 03/18/2019     Lab Results   Component Value Date    LABA1C 6.0 05/04/2014    LABA1C 6.1 05/03/2014     Lab Results   Component Value Date    WBC 13.7 (H) 03/26/2019    HCT 43.1 03/26/2019    MCV 99.3 03/26/2019     03/26/2019     Lab Results   Component Value Date    CHOL 180 11/15/2017    TRIG 117 11/15/2017    HDL 55 11/15/2017    LDLCALC 102 11/15/2017    LDLDIRECT 155 (H) 05/03/2014     Lab Results   Component Value Date    ALT 17 11/14/2017    AST 22 11/14/2017     BMP:    Lab Results   Component Value Date     03/26/2019    K 4.3 03/26/2019    CL 98 03/26/2019    CO2 24 03/26/2019    BUN 22 03/26/2019    CREATININE 1.0 03/26/2019     CMP:   Lab Results   Component Value Date     03/26/2019    K 4.3 03/26/2019    CL 98 03/26/2019    CO2 24 03/26/2019    BUN 22 03/26/2019    PROT 6.2 05/03/2014    PROT 7.1 01/25/2011     TSH:    Lab Results   Component Value Date    TSHHS 0.883 06/01/2013           Impression:    No diagnosis found.    Patient Active Problem List   Diagnosis Code   

## 2019-04-05 NOTE — LETTER
*CLINICAL STAFF DOCUMENTATION    Have you had any Chest Pain - No      Have you had any Shortness of Breath - No    Have you had any dizziness - No      Have you had any palpitations - No        Do you have any edema - swelling in  none. Do you have a surgery or procedure scheduled in the near future - No      BE SURE TO ASK PATIENT IF THEY NEED MEDICATION REFILLS    JOHN Hurtado.

## 2019-04-22 ENCOUNTER — PROCEDURE VISIT (OUTPATIENT)
Dept: CARDIOLOGY CLINIC | Age: 69
End: 2019-04-22
Payer: MEDICARE

## 2019-04-22 DIAGNOSIS — I73.9 CLAUDICATION (HCC): Primary | ICD-10-CM

## 2019-04-22 PROCEDURE — 93922 UPR/L XTREMITY ART 2 LEVELS: CPT | Performed by: INTERNAL MEDICINE

## 2019-04-22 PROCEDURE — 93925 LOWER EXTREMITY STUDY: CPT | Performed by: INTERNAL MEDICINE

## 2019-04-24 ENCOUNTER — TELEPHONE (OUTPATIENT)
Dept: CARDIOLOGY CLINIC | Age: 69
End: 2019-04-24

## 2019-08-05 RX ORDER — LISINOPRIL 20 MG/1
TABLET ORAL
Qty: 90 TABLET | Refills: 0 | Status: SHIPPED | OUTPATIENT
Start: 2019-08-05 | End: 2019-08-30 | Stop reason: SDUPTHER

## 2019-08-19 DIAGNOSIS — N52.9 PRIMARY ERECTILE DYSFUNCTION: ICD-10-CM

## 2019-08-19 RX ORDER — ALBUTEROL SULFATE 90 UG/1
2 AEROSOL, METERED RESPIRATORY (INHALATION) 4 TIMES DAILY PRN
COMMUNITY
End: 2019-09-04 | Stop reason: SDUPTHER

## 2019-08-19 RX ORDER — SILDENAFIL 100 MG/1
100 TABLET, FILM COATED ORAL PRN
COMMUNITY
End: 2020-03-06 | Stop reason: SDUPTHER

## 2019-08-30 ENCOUNTER — OFFICE VISIT (OUTPATIENT)
Dept: FAMILY MEDICINE CLINIC | Age: 69
End: 2019-08-30
Payer: MEDICARE

## 2019-08-30 VITALS
BODY MASS INDEX: 30.21 KG/M2 | SYSTOLIC BLOOD PRESSURE: 132 MMHG | HEIGHT: 70 IN | HEART RATE: 68 BPM | OXYGEN SATURATION: 98 % | DIASTOLIC BLOOD PRESSURE: 80 MMHG | WEIGHT: 211 LBS

## 2019-08-30 DIAGNOSIS — K21.9 GASTROESOPHAGEAL REFLUX DISEASE WITHOUT ESOPHAGITIS: ICD-10-CM

## 2019-08-30 DIAGNOSIS — J44.9 CHRONIC OBSTRUCTIVE PULMONARY DISEASE, UNSPECIFIED COPD TYPE (HCC): ICD-10-CM

## 2019-08-30 DIAGNOSIS — I21.4 NSTEMI (NON-ST ELEVATED MYOCARDIAL INFARCTION) (HCC): ICD-10-CM

## 2019-08-30 DIAGNOSIS — Z95.1 S/P CABG X 4: ICD-10-CM

## 2019-08-30 DIAGNOSIS — N52.9 IMPOTENCE: ICD-10-CM

## 2019-08-30 DIAGNOSIS — R73.9 HYPERGLYCEMIA: ICD-10-CM

## 2019-08-30 DIAGNOSIS — N52.9 ERECTILE DYSFUNCTION, UNSPECIFIED ERECTILE DYSFUNCTION TYPE: ICD-10-CM

## 2019-08-30 DIAGNOSIS — I10 ESSENTIAL HYPERTENSION: Primary | ICD-10-CM

## 2019-08-30 DIAGNOSIS — Z72.0 TOBACCO ABUSE: ICD-10-CM

## 2019-08-30 PROCEDURE — G8417 CALC BMI ABV UP PARAM F/U: HCPCS | Performed by: PHYSICIAN ASSISTANT

## 2019-08-30 PROCEDURE — 4040F PNEUMOC VAC/ADMIN/RCVD: CPT | Performed by: PHYSICIAN ASSISTANT

## 2019-08-30 PROCEDURE — G8427 DOCREV CUR MEDS BY ELIG CLIN: HCPCS | Performed by: PHYSICIAN ASSISTANT

## 2019-08-30 PROCEDURE — 3017F COLORECTAL CA SCREEN DOC REV: CPT | Performed by: PHYSICIAN ASSISTANT

## 2019-08-30 PROCEDURE — 99214 OFFICE O/P EST MOD 30 MIN: CPT | Performed by: PHYSICIAN ASSISTANT

## 2019-08-30 PROCEDURE — 1036F TOBACCO NON-USER: CPT | Performed by: PHYSICIAN ASSISTANT

## 2019-08-30 PROCEDURE — 1123F ACP DISCUSS/DSCN MKR DOCD: CPT | Performed by: PHYSICIAN ASSISTANT

## 2019-08-30 PROCEDURE — G8598 ASA/ANTIPLAT THER USED: HCPCS | Performed by: PHYSICIAN ASSISTANT

## 2019-08-30 PROCEDURE — 3023F SPIROM DOC REV: CPT | Performed by: PHYSICIAN ASSISTANT

## 2019-08-30 PROCEDURE — G8926 SPIRO NO PERF OR DOC: HCPCS | Performed by: PHYSICIAN ASSISTANT

## 2019-08-30 RX ORDER — PANTOPRAZOLE SODIUM 20 MG/1
20 TABLET, DELAYED RELEASE ORAL
Qty: 90 TABLET | Refills: 1 | Status: SHIPPED | OUTPATIENT
Start: 2019-08-30 | End: 2020-03-06 | Stop reason: SDUPTHER

## 2019-08-30 RX ORDER — CARVEDILOL 12.5 MG/1
12.5 TABLET ORAL 2 TIMES DAILY WITH MEALS
Qty: 180 TABLET | Refills: 1 | Status: SHIPPED | OUTPATIENT
Start: 2019-08-30 | End: 2020-03-06 | Stop reason: SDUPTHER

## 2019-08-30 RX ORDER — AMLODIPINE BESYLATE 5 MG/1
5 TABLET ORAL EVERY MORNING
Qty: 90 TABLET | Refills: 1 | Status: SHIPPED | OUTPATIENT
Start: 2019-08-30 | End: 2020-03-06 | Stop reason: SDUPTHER

## 2019-08-30 RX ORDER — FLUTICASONE FUROATE AND VILANTEROL 200; 25 UG/1; UG/1
1 POWDER RESPIRATORY (INHALATION) DAILY
Qty: 90 EACH | Refills: 1 | Status: SHIPPED | OUTPATIENT
Start: 2019-08-30 | End: 2019-09-17 | Stop reason: ALTCHOICE

## 2019-08-30 RX ORDER — LISINOPRIL 20 MG/1
20 TABLET ORAL DAILY
Qty: 90 TABLET | Refills: 1 | Status: SHIPPED | OUTPATIENT
Start: 2019-08-30 | End: 2020-03-06 | Stop reason: SDUPTHER

## 2019-08-30 ASSESSMENT — PATIENT HEALTH QUESTIONNAIRE - PHQ9
1. LITTLE INTEREST OR PLEASURE IN DOING THINGS: 0
2. FEELING DOWN, DEPRESSED OR HOPELESS: 0
SUM OF ALL RESPONSES TO PHQ QUESTIONS 1-9: 0
SUM OF ALL RESPONSES TO PHQ9 QUESTIONS 1 & 2: 0
SUM OF ALL RESPONSES TO PHQ QUESTIONS 1-9: 0

## 2019-08-30 NOTE — PROGRESS NOTES
8/30/2019    Ino Machado    Chief Complaint   Patient presents with    6 Month Follow-Up    Medication Refill    Gastroesophageal Reflux     heartburn has increased. HPI  History was obtained from the patient. Emerita Gilbert is a 78 y.o. male who presents today routine follow. Patient states he has been doing well he has been staying active. He is complaining of some heartburn and takes Tums over-the-counter on occasion and this helps resolve the symptoms. He does not take it but 1 or 2 times a week. He otherwise states he is able to do his usual activities of daily living with no shortness of breath or chest pain. REVIEW OF SYMPTOMS    Constitutional:  Denies fever, chills, weight loss or weakness  Eyes:  Denies photophobia or discharge  ENT:  Denies sore throat or ear pain  Cardiovascular:  Denies chest pain, palpitations or swelling  Respiratory:  Denies cough or shortness of breath  GI:  Denies abdominal pain, nausea, vomiting, or diarrhea he has intermittent heartburn  Skin:  No rashes  Neurologic:  Denies headache, focal weakness, or sensory changes  :  Denies polyuria or polydipsia does have some impotence  Lymphatic:  Denies swollen glands  Psychiatric:  Denies depression, suicidal ideation or homicidal ideation  All symptoms negative except as marked.      PAST MEDICAL HISTORY  Past Medical History:   Diagnosis Date    Acid reflux     Basal Cell Skin Cancer Excised Nose 1992    Basal Cell Skin Cancer Excised Nose    CAD (coronary artery disease)     Sees Dr. Naty Cheung COPD (chronic obstructive pulmonary disease) ([facility] [address]     Essential hypertension     H/O cardiovascular stress test 5/29/2014    cardiolite-normal, no ischemia, EF 60%    H/O cardiovascular stress test 1/18/2015    cardiolite-normal, EF51%    H/O cardiovascular stress test 02/12/2018    EF60% normal study    H/O Doppler lower arterial ultrasound 04/22/2019    No significant occlusive arterial disease, Patent right set him up for pulmonary function test to see if he truly does have COPD or not. Patient will otherwise follow-up in 1 month. He will follow-up with cardiology and cardiothoracic surgery as scheduled in October. He will call or go to the ER with any illness worsening symptoms questions or concerns. He verbalizes understanding of the above plan and is in agreement    Plan of care reviewed with patient who verbalizes understanding and wishes to continue. Patient verbalizes understanding with the above plan and is in agreement. Patient will call with  worsening of symptoms, questions or concerns. Please note that this chart was generated using dragon dictation software. Although every effort was made to ensure the accuracy of this automated transcription, some errors in transcription may have occurred.     Electronically signed by Rachel Delgadillo PA-C on 8/30/2019

## 2019-08-30 NOTE — TELEPHONE ENCOUNTER
----- Message from William Chavez sent at 8/30/2019 10:33 AM EDT -----  Contact: KIRBY --[address] [address]-----  [phone]  RE:    QAMAR CATES 200-25 ---SHOULD THIS BE A 90 DAY SUPPLY---THIS ONLY COME IN QUANTITY OF 60. PLEASE ADVISE.

## 2019-09-04 ENCOUNTER — TELEPHONE (OUTPATIENT)
Dept: FAMILY MEDICINE CLINIC | Age: 69
End: 2019-09-04

## 2019-09-04 DIAGNOSIS — J44.9 CHRONIC OBSTRUCTIVE PULMONARY DISEASE, UNSPECIFIED COPD TYPE (HCC): ICD-10-CM

## 2019-09-04 DIAGNOSIS — N52.9 ERECTILE DYSFUNCTION, UNSPECIFIED ERECTILE DYSFUNCTION TYPE: ICD-10-CM

## 2019-09-04 DIAGNOSIS — I10 ESSENTIAL HYPERTENSION: ICD-10-CM

## 2019-09-04 DIAGNOSIS — N52.9 IMPOTENCE: ICD-10-CM

## 2019-09-04 DIAGNOSIS — R73.9 HYPERGLYCEMIA: ICD-10-CM

## 2019-09-04 LAB
A/G RATIO: 1.5 (ref 1.1–2.2)
ALBUMIN SERPL-MCNC: 4.1 G/DL (ref 3.4–5)
ALP BLD-CCNC: 71 U/L (ref 40–129)
ALT SERPL-CCNC: 15 U/L (ref 10–40)
ANION GAP SERPL CALCULATED.3IONS-SCNC: 11 MMOL/L (ref 3–16)
AST SERPL-CCNC: 18 U/L (ref 15–37)
BASOPHILS ABSOLUTE: 0.1 K/UL (ref 0–0.2)
BASOPHILS RELATIVE PERCENT: 1.9 %
BILIRUB SERPL-MCNC: 0.5 MG/DL (ref 0–1)
BUN BLDV-MCNC: 22 MG/DL (ref 7–20)
CALCIUM SERPL-MCNC: 9.3 MG/DL (ref 8.3–10.6)
CHLORIDE BLD-SCNC: 103 MMOL/L (ref 99–110)
CHOLESTEROL, TOTAL: 158 MG/DL (ref 0–199)
CO2: 27 MMOL/L (ref 21–32)
CREAT SERPL-MCNC: 0.9 MG/DL (ref 0.8–1.3)
EOSINOPHILS ABSOLUTE: 0.1 K/UL (ref 0–0.6)
EOSINOPHILS RELATIVE PERCENT: 1.7 %
GFR AFRICAN AMERICAN: >60
GFR NON-AFRICAN AMERICAN: >60
GLOBULIN: 2.8 G/DL
GLUCOSE BLD-MCNC: 110 MG/DL (ref 70–99)
HCT VFR BLD CALC: 44.6 % (ref 40.5–52.5)
HDLC SERPL-MCNC: 52 MG/DL (ref 40–60)
HEMOGLOBIN: 15.2 G/DL (ref 13.5–17.5)
LDL CHOLESTEROL CALCULATED: 86 MG/DL
LYMPHOCYTES ABSOLUTE: 1.3 K/UL (ref 1–5.1)
LYMPHOCYTES RELATIVE PERCENT: 21.1 %
MCH RBC QN AUTO: 33.7 PG (ref 26–34)
MCHC RBC AUTO-ENTMCNC: 34 G/DL (ref 31–36)
MCV RBC AUTO: 99.3 FL (ref 80–100)
MONOCYTES ABSOLUTE: 0.7 K/UL (ref 0–1.3)
MONOCYTES RELATIVE PERCENT: 10.4 %
NEUTROPHILS ABSOLUTE: 4.1 K/UL (ref 1.7–7.7)
NEUTROPHILS RELATIVE PERCENT: 64.9 %
PDW BLD-RTO: 14.2 % (ref 12.4–15.4)
PLATELET # BLD: 230 K/UL (ref 135–450)
PMV BLD AUTO: 6.7 FL (ref 5–10.5)
POTASSIUM SERPL-SCNC: 4.9 MMOL/L (ref 3.5–5.1)
PROSTATE SPECIFIC ANTIGEN: 1.9 NG/ML (ref 0–4)
RBC # BLD: 4.49 M/UL (ref 4.2–5.9)
SODIUM BLD-SCNC: 141 MMOL/L (ref 136–145)
TOTAL PROTEIN: 6.9 G/DL (ref 6.4–8.2)
TRIGL SERPL-MCNC: 101 MG/DL (ref 0–150)
TSH REFLEX FT4: 0.98 UIU/ML (ref 0.27–4.2)
VLDLC SERPL CALC-MCNC: 20 MG/DL
WBC # BLD: 6.3 K/UL (ref 4–11)

## 2019-09-04 RX ORDER — ALBUTEROL SULFATE 90 UG/1
2 AEROSOL, METERED RESPIRATORY (INHALATION) 4 TIMES DAILY PRN
Qty: 1 INHALER | Refills: 2 | Status: SHIPPED | OUTPATIENT
Start: 2019-09-04 | End: 2020-03-06 | Stop reason: SDUPTHER

## 2019-09-04 NOTE — TELEPHONE ENCOUNTER
----- Message from Shaka Leahy sent at 9/4/2019 10:47 AM EDT -----  ALBUTEROL IN [facility]

## 2019-09-05 LAB
ESTIMATED AVERAGE GLUCOSE: 125.5 MG/DL
HBA1C MFR BLD: 6 %

## 2019-09-05 NOTE — TELEPHONE ENCOUNTER
Spoke with patient about labs, his gerd has improved and would like to wait to come in until after he had the PFT's done. He states understanding and will call to f/u.

## 2019-09-06 LAB — TESTOSTERONE TOTAL: 665 NG/DL (ref 220–1000)

## 2019-09-09 ENCOUNTER — TELEPHONE (OUTPATIENT)
Dept: FAMILY MEDICINE CLINIC | Age: 69
End: 2019-09-09

## 2019-09-09 NOTE — TELEPHONE ENCOUNTER
Spoke with patient about labs done 9/9/19 reviewed by Arthur Olivo PA-C, Patient states understanding.

## 2019-09-16 DIAGNOSIS — I10 HYPERTENSION: ICD-10-CM

## 2019-09-16 DIAGNOSIS — Z72.0 TOBACCO ABUSE: ICD-10-CM

## 2019-09-16 DIAGNOSIS — Z95.1 S/P CABG X 4: ICD-10-CM

## 2019-09-16 DIAGNOSIS — I21.4 NSTEMI (NON-ST ELEVATED MYOCARDIAL INFARCTION) (HCC): ICD-10-CM

## 2019-09-16 RX ORDER — CLOPIDOGREL BISULFATE 75 MG/1
TABLET ORAL
Qty: 45 TABLET | Refills: 0 | Status: SHIPPED | OUTPATIENT
Start: 2019-09-16 | End: 2019-12-13 | Stop reason: SDUPTHER

## 2019-09-17 ENCOUNTER — OFFICE VISIT (OUTPATIENT)
Dept: CARDIOLOGY CLINIC | Age: 69
End: 2019-09-17
Payer: MEDICARE

## 2019-09-17 VITALS
SYSTOLIC BLOOD PRESSURE: 114 MMHG | HEART RATE: 60 BPM | DIASTOLIC BLOOD PRESSURE: 68 MMHG | WEIGHT: 214.4 LBS | HEIGHT: 71 IN | BODY MASS INDEX: 30.02 KG/M2 | OXYGEN SATURATION: 97 %

## 2019-09-17 DIAGNOSIS — R06.02 SHORTNESS OF BREATH: ICD-10-CM

## 2019-09-17 DIAGNOSIS — I10 ESSENTIAL HYPERTENSION: ICD-10-CM

## 2019-09-17 DIAGNOSIS — I25.10 ATHEROSCLEROSIS OF NATIVE CORONARY ARTERY OF NATIVE HEART WITHOUT ANGINA PECTORIS: ICD-10-CM

## 2019-09-17 DIAGNOSIS — R53.83 FATIGUE, UNSPECIFIED TYPE: ICD-10-CM

## 2019-09-17 DIAGNOSIS — Z95.1 S/P CABG X 4: Primary | ICD-10-CM

## 2019-09-17 PROCEDURE — 4040F PNEUMOC VAC/ADMIN/RCVD: CPT | Performed by: NURSE PRACTITIONER

## 2019-09-17 PROCEDURE — 93000 ELECTROCARDIOGRAM COMPLETE: CPT | Performed by: NURSE PRACTITIONER

## 2019-09-17 PROCEDURE — 99213 OFFICE O/P EST LOW 20 MIN: CPT | Performed by: NURSE PRACTITIONER

## 2019-09-17 PROCEDURE — 1036F TOBACCO NON-USER: CPT | Performed by: NURSE PRACTITIONER

## 2019-09-17 PROCEDURE — G8427 DOCREV CUR MEDS BY ELIG CLIN: HCPCS | Performed by: NURSE PRACTITIONER

## 2019-09-17 PROCEDURE — 3017F COLORECTAL CA SCREEN DOC REV: CPT | Performed by: NURSE PRACTITIONER

## 2019-09-17 PROCEDURE — G8417 CALC BMI ABV UP PARAM F/U: HCPCS | Performed by: NURSE PRACTITIONER

## 2019-09-17 PROCEDURE — 1123F ACP DISCUSS/DSCN MKR DOCD: CPT | Performed by: NURSE PRACTITIONER

## 2019-09-17 PROCEDURE — G8598 ASA/ANTIPLAT THER USED: HCPCS | Performed by: NURSE PRACTITIONER

## 2019-09-17 ASSESSMENT — ENCOUNTER SYMPTOMS
BLOOD IN STOOL: 0
SORE THROAT: 0
CHEST TIGHTNESS: 0
ABDOMINAL PAIN: 0
EYE ITCHING: 0
ABDOMINAL DISTENTION: 0
CONSTIPATION: 0
SINUS PRESSURE: 0
VOMITING: 0
COLOR CHANGE: 0
SHORTNESS OF BREATH: 1
BACK PAIN: 0
NAUSEA: 0
DIARRHEA: 0
EYE REDNESS: 0
SINUS PAIN: 0

## 2019-09-17 NOTE — PROGRESS NOTES
CARDIOLOGY  NOTE      9/17/2019    RE: Kody Nolasco  (1950)                               TO:  Dr. Hannah Roque MD  The primary cardiologist is Dr Kwasi Reyes    CC:  Complains of dizziness and shortness of breath, feels like he did 5 years ago before he had MI    HPI: Thank you for involving me in taking care of your patient Kody Nolasco, who is a  78y.o. year old male with a history of HTN, CAD, S/P CABG, PVD. He is seen today for with complaints of fatigue, dizziness, and shortness of breath with exertion. He during this visit  denies chest pain, palpitations,  edema,  or syncope. He reports that these symptoms started a month ago and have progressively become worse. He states that he feels like he did prior to his MI 5 years ago. He denies aggravating or alleviating factors. He has had a prior CABG. Also states that he has peripheral stent to his bilateral lower legs.        Vitals:    09/17/19 1046   BP: 114/68   Pulse: 60   SpO2: 97%       Current Outpatient Medications   Medication Sig Dispense Refill    clopidogrel (PLAVIX) 75 MG tablet TAKE ONE TABLET BY MOUTH DAILY 1/2 tab daily 45 tablet 0    Evolocumab (REPATHA) 140 MG/ML SOSY Inject 140 mg into the skin every 14 days 2 Syringe 5    albuterol sulfate  (90 Base) MCG/ACT inhaler Inhale 2 puffs into the lungs 4 times daily as needed for Wheezing 1 Inhaler 2    lisinopril (PRINIVIL;ZESTRIL) 20 MG tablet Take 1 tablet by mouth daily 90 tablet 1    amLODIPine (NORVASC) 5 MG tablet Take 1 tablet by mouth every morning 90 tablet 1    carvedilol (COREG) 12.5 MG tablet Take 1 tablet by mouth 2 times daily (with meals) 180 tablet 1    pantoprazole (PROTONIX) 20 MG tablet Take 1 tablet by mouth every morning (before breakfast) 90 tablet 1    sildenafil (VIAGRA) 100 MG tablet Take 100 mg by mouth as needed for Erectile Dysfunction      aspirin 81 MG tablet Take 81 mg by mouth nightly Over The Counter       No current facility-administered medications for this visit. Allergies: Statins;  Lipitor [atorvastatin calcium]; and Spiriva handihaler [tiotropium bromide monohydrate]  Past Medical History:   Diagnosis Date    Acid reflux     Basal Cell Skin Cancer Excised Nose 1992    Basal Cell Skin Cancer Excised Nose    CAD (coronary artery disease)     Sees Dr. Richi Bone COPD (chronic obstructive pulmonary disease) ([facility] [address]     Essential hypertension     H/O cardiovascular stress test 5/29/2014    cardiolite-normal, no ischemia, EF 60%    H/O cardiovascular stress test 1/18/2015    cardiolite-normal, EF51%    H/O cardiovascular stress test 02/12/2018    EF60% normal study    H/O Doppler lower arterial ultrasound 04/22/2019    No significant occlusive arterial disease, Patent right femoral popliteal bypass graft with biphasic waveforms, bilateral abis show mild peripheral arterial disease    H/O Doppler ultrasound 6/1/2016    Lower Ext Arterial- normal study, mild stenosis in RLE    H/O echocardiogram 5/28/2014    EF35-45% mild MR, mild AV calcification    Heart attack ([facility] [address] 2014    History of cardiac cath 02/12/2019    PATENT RENAL ARTERIES DISTAL ABD AORTA ATHEROSCLEROTIC, ANEYRYSMAL, RCIA PARENT, REIA  PATENT, RT SFA OCCLUDED, DISTALLY RECONSTITUTES, 3 V RUNOFF,  LT MARIA ISABEL PATENT, CHRIS 70% TO 0%,  LT SFA 80% TO 0% WITH ATHRECTOMY TO PTA, LT POP PATENT,  3 V run off                        History of percutaneous left heart catheterization 5/3/2014    RCA 99% distal stenosis EF55%    History of Peripheral Angiogram 03/07/2019    RCIA PATENT, REIA CALCIFIED 70% STENOSIS, RSFA OCCLUDED NO STUMP, PFA  PATENT FILL DISTAL SFA WITH COLLATERLS, RT POP PATENT, 3 V RUN OFF    Hyperlipidemia     Lumbosacral spondylosis     Primary erectile dysfunction     S/P CABG x 4 2014    vein graft diagonal coronary artery,vein graft distal RCA,vein graft obtuse marginal coronary artery and Neurological: Positive for dizziness. Negative for syncope, weakness and light-headedness. Psychiatric/Behavioral: Negative for agitation and confusion. The patient is not nervous/anxious. Objective:      Physical Exam:  /68   Pulse 60   Ht 5' 11\" (1.803 m)   Wt 214 lb 6.4 oz (97.3 kg)   SpO2 97%   BMI 29.90 kg/m²   Wt Readings from Last 3 Encounters:   09/17/19 214 lb 6.4 oz (97.3 kg)   08/30/19 211 lb (95.7 kg)   04/29/19 214 lb (97.1 kg)     Body mass index is 29.9 kg/m². Physical Exam   Constitutional: He is oriented to person, place, and time. He appears well-developed and well-nourished. HENT:   Head: Normocephalic and atraumatic. Eyes: Pupils are equal, round, and reactive to light. Conjunctivae are normal. Right eye exhibits no discharge. Left eye exhibits no discharge. Neck: Neck supple. No JVD present. No thyromegaly present. Cardiovascular: Normal rate, regular rhythm, normal heart sounds and intact distal pulses. Exam reveals no gallop and no friction rub. No murmur heard. Pulmonary/Chest: Effort normal and breath sounds normal. No stridor. No respiratory distress. He has no wheezes. He has no rales. Abdominal: Bowel sounds are normal. He exhibits no distension. There is no tenderness. Musculoskeletal: He exhibits no edema, tenderness or deformity. Neurological: He is alert and oriented to person, place, and time. Skin: Skin is warm and dry. Psychiatric: He has a normal mood and affect.  His behavior is normal. Judgment and thought content normal.       DATA:  Lab Results   Component Value Date    TROPONINI 3.264 05/02/2014     BNP:    Lab Results   Component Value Date    BNP 22 05/02/2014     PT/INR:  No results found for: PTINR  Lab Results   Component Value Date    LABA1C 6.0 09/04/2019    LABA1C 5.8 01/29/2019     Lab Results   Component Value Date    CHOL 158 09/04/2019    TRIG 101 09/04/2019    HDL 52 09/04/2019    LDLCALC 86 09/04/2019    LDLDIRECT 155

## 2019-09-25 ENCOUNTER — PROCEDURE VISIT (OUTPATIENT)
Dept: CARDIOLOGY CLINIC | Age: 69
End: 2019-09-25
Payer: MEDICARE

## 2019-09-25 DIAGNOSIS — Z95.1 S/P CABG X 4: ICD-10-CM

## 2019-09-25 DIAGNOSIS — R06.02 SHORTNESS OF BREATH: ICD-10-CM

## 2019-09-25 DIAGNOSIS — R53.83 FATIGUE, UNSPECIFIED TYPE: ICD-10-CM

## 2019-09-25 DIAGNOSIS — R06.02 SHORTNESS OF BREATH: Primary | ICD-10-CM

## 2019-09-25 LAB
LV EF: 51 %
LV EF: 58 %
LVEF MODALITY: NORMAL
LVEF MODALITY: NORMAL

## 2019-09-25 PROCEDURE — 93018 CV STRESS TEST I&R ONLY: CPT | Performed by: INTERNAL MEDICINE

## 2019-09-25 PROCEDURE — 93306 TTE W/DOPPLER COMPLETE: CPT | Performed by: INTERNAL MEDICINE

## 2019-09-25 PROCEDURE — 93016 CV STRESS TEST SUPVJ ONLY: CPT | Performed by: INTERNAL MEDICINE

## 2019-09-25 PROCEDURE — A9500 TC99M SESTAMIBI: HCPCS | Performed by: INTERNAL MEDICINE

## 2019-09-25 PROCEDURE — 78452 HT MUSCLE IMAGE SPECT MULT: CPT | Performed by: INTERNAL MEDICINE

## 2019-09-25 PROCEDURE — 93017 CV STRESS TEST TRACING ONLY: CPT | Performed by: INTERNAL MEDICINE

## 2019-09-26 ENCOUNTER — TELEPHONE (OUTPATIENT)
Dept: CARDIOLOGY CLINIC | Age: 69
End: 2019-09-26

## 2019-10-04 ENCOUNTER — OFFICE VISIT (OUTPATIENT)
Dept: FAMILY MEDICINE CLINIC | Age: 69
End: 2019-10-04
Payer: MEDICARE

## 2019-10-04 VITALS
BODY MASS INDEX: 30.35 KG/M2 | HEART RATE: 76 BPM | WEIGHT: 212 LBS | SYSTOLIC BLOOD PRESSURE: 130 MMHG | DIASTOLIC BLOOD PRESSURE: 80 MMHG | HEIGHT: 70 IN

## 2019-10-04 DIAGNOSIS — R73.9 HYPERGLYCEMIA: Primary | ICD-10-CM

## 2019-10-04 DIAGNOSIS — J44.9 CHRONIC OBSTRUCTIVE PULMONARY DISEASE, UNSPECIFIED COPD TYPE (HCC): ICD-10-CM

## 2019-10-04 DIAGNOSIS — I10 ESSENTIAL HYPERTENSION: ICD-10-CM

## 2019-10-04 PROCEDURE — 3023F SPIROM DOC REV: CPT | Performed by: PHYSICIAN ASSISTANT

## 2019-10-04 PROCEDURE — 3017F COLORECTAL CA SCREEN DOC REV: CPT | Performed by: PHYSICIAN ASSISTANT

## 2019-10-04 PROCEDURE — 1036F TOBACCO NON-USER: CPT | Performed by: PHYSICIAN ASSISTANT

## 2019-10-04 PROCEDURE — G8427 DOCREV CUR MEDS BY ELIG CLIN: HCPCS | Performed by: PHYSICIAN ASSISTANT

## 2019-10-04 PROCEDURE — 4040F PNEUMOC VAC/ADMIN/RCVD: CPT | Performed by: PHYSICIAN ASSISTANT

## 2019-10-04 PROCEDURE — G8484 FLU IMMUNIZE NO ADMIN: HCPCS | Performed by: PHYSICIAN ASSISTANT

## 2019-10-04 PROCEDURE — G8417 CALC BMI ABV UP PARAM F/U: HCPCS | Performed by: PHYSICIAN ASSISTANT

## 2019-10-04 PROCEDURE — 1123F ACP DISCUSS/DSCN MKR DOCD: CPT | Performed by: PHYSICIAN ASSISTANT

## 2019-10-04 PROCEDURE — 99213 OFFICE O/P EST LOW 20 MIN: CPT | Performed by: PHYSICIAN ASSISTANT

## 2019-10-04 PROCEDURE — G8926 SPIRO NO PERF OR DOC: HCPCS | Performed by: PHYSICIAN ASSISTANT

## 2019-10-04 PROCEDURE — G8598 ASA/ANTIPLAT THER USED: HCPCS | Performed by: PHYSICIAN ASSISTANT

## 2019-10-07 ENCOUNTER — OFFICE VISIT (OUTPATIENT)
Dept: FAMILY MEDICINE CLINIC | Age: 69
End: 2019-10-07
Payer: MEDICARE

## 2019-10-07 VITALS
WEIGHT: 214.4 LBS | OXYGEN SATURATION: 95 % | SYSTOLIC BLOOD PRESSURE: 138 MMHG | HEART RATE: 94 BPM | DIASTOLIC BLOOD PRESSURE: 90 MMHG | BODY MASS INDEX: 30.76 KG/M2 | TEMPERATURE: 98.1 F

## 2019-10-07 DIAGNOSIS — J44.1 COPD WITH ACUTE EXACERBATION (HCC): Primary | ICD-10-CM

## 2019-10-07 PROCEDURE — G8417 CALC BMI ABV UP PARAM F/U: HCPCS | Performed by: NURSE PRACTITIONER

## 2019-10-07 PROCEDURE — G8427 DOCREV CUR MEDS BY ELIG CLIN: HCPCS | Performed by: NURSE PRACTITIONER

## 2019-10-07 PROCEDURE — 3023F SPIROM DOC REV: CPT | Performed by: NURSE PRACTITIONER

## 2019-10-07 PROCEDURE — 99213 OFFICE O/P EST LOW 20 MIN: CPT | Performed by: NURSE PRACTITIONER

## 2019-10-07 PROCEDURE — 4040F PNEUMOC VAC/ADMIN/RCVD: CPT | Performed by: NURSE PRACTITIONER

## 2019-10-07 PROCEDURE — G8926 SPIRO NO PERF OR DOC: HCPCS | Performed by: NURSE PRACTITIONER

## 2019-10-07 PROCEDURE — 3017F COLORECTAL CA SCREEN DOC REV: CPT | Performed by: NURSE PRACTITIONER

## 2019-10-07 PROCEDURE — 1123F ACP DISCUSS/DSCN MKR DOCD: CPT | Performed by: NURSE PRACTITIONER

## 2019-10-07 PROCEDURE — G8598 ASA/ANTIPLAT THER USED: HCPCS | Performed by: NURSE PRACTITIONER

## 2019-10-07 PROCEDURE — 1036F TOBACCO NON-USER: CPT | Performed by: NURSE PRACTITIONER

## 2019-10-07 PROCEDURE — G8484 FLU IMMUNIZE NO ADMIN: HCPCS | Performed by: NURSE PRACTITIONER

## 2019-10-07 RX ORDER — METHYLPREDNISOLONE 4 MG/1
TABLET ORAL
Qty: 1 KIT | Refills: 0 | Status: SHIPPED | OUTPATIENT
Start: 2019-10-07 | End: 2019-10-13

## 2019-10-07 RX ORDER — BENZONATATE 100 MG/1
100 CAPSULE ORAL 3 TIMES DAILY PRN
Qty: 20 CAPSULE | Refills: 0 | Status: SHIPPED | OUTPATIENT
Start: 2019-10-07 | End: 2020-08-28

## 2019-10-07 ASSESSMENT — ENCOUNTER SYMPTOMS
SINUS PAIN: 0
CHEST TIGHTNESS: 1
HEARTBURN: 0
SINUS PRESSURE: 0
DIARRHEA: 1
NAUSEA: 0
HEMOPTYSIS: 0
SORE THROAT: 0
VOMITING: 0
SHORTNESS OF BREATH: 1
WHEEZING: 1
COUGH: 1
RHINORRHEA: 0

## 2019-10-08 ENCOUNTER — OFFICE VISIT (OUTPATIENT)
Dept: CARDIOLOGY CLINIC | Age: 69
End: 2019-10-08
Payer: MEDICARE

## 2019-10-08 VITALS
SYSTOLIC BLOOD PRESSURE: 108 MMHG | DIASTOLIC BLOOD PRESSURE: 62 MMHG | HEIGHT: 71 IN | BODY MASS INDEX: 29.82 KG/M2 | WEIGHT: 213 LBS | HEART RATE: 76 BPM

## 2019-10-08 DIAGNOSIS — R42 DIZZINESS: ICD-10-CM

## 2019-10-08 DIAGNOSIS — I10 ESSENTIAL HYPERTENSION: ICD-10-CM

## 2019-10-08 DIAGNOSIS — I25.10 ATHEROSCLEROSIS OF NATIVE CORONARY ARTERY OF NATIVE HEART WITHOUT ANGINA PECTORIS: Primary | ICD-10-CM

## 2019-10-08 DIAGNOSIS — I73.9 PVD (PERIPHERAL VASCULAR DISEASE) (HCC): ICD-10-CM

## 2019-10-08 PROCEDURE — 1123F ACP DISCUSS/DSCN MKR DOCD: CPT | Performed by: NURSE PRACTITIONER

## 2019-10-08 PROCEDURE — G8427 DOCREV CUR MEDS BY ELIG CLIN: HCPCS | Performed by: NURSE PRACTITIONER

## 2019-10-08 PROCEDURE — 1036F TOBACCO NON-USER: CPT | Performed by: NURSE PRACTITIONER

## 2019-10-08 PROCEDURE — 99214 OFFICE O/P EST MOD 30 MIN: CPT | Performed by: NURSE PRACTITIONER

## 2019-10-08 PROCEDURE — 3017F COLORECTAL CA SCREEN DOC REV: CPT | Performed by: NURSE PRACTITIONER

## 2019-10-08 PROCEDURE — 4040F PNEUMOC VAC/ADMIN/RCVD: CPT | Performed by: NURSE PRACTITIONER

## 2019-10-08 PROCEDURE — G8598 ASA/ANTIPLAT THER USED: HCPCS | Performed by: NURSE PRACTITIONER

## 2019-10-08 PROCEDURE — G8484 FLU IMMUNIZE NO ADMIN: HCPCS | Performed by: NURSE PRACTITIONER

## 2019-10-08 PROCEDURE — G8417 CALC BMI ABV UP PARAM F/U: HCPCS | Performed by: NURSE PRACTITIONER

## 2019-10-21 ENCOUNTER — HOSPITAL ENCOUNTER (OUTPATIENT)
Dept: PULMONOLOGY | Age: 69
Discharge: HOME | End: 2019-10-21
Payer: MEDICARE

## 2019-10-21 DIAGNOSIS — J44.9 CHRONIC OBSTRUCTIVE PULMONARY DISEASE, UNSPECIFIED COPD TYPE (HCC): ICD-10-CM

## 2019-10-21 LAB
DLCO %PRED: 53 %
DLCO PRED: NORMAL ML/MIN/MMHG
DLCO/VA %PRED: NORMAL %
DLCO/VA PRED: NORMAL ML/MIN/MMHG
DLCO/VA: NORMAL ML/MIN/MMHG
DLCO: NORMAL ML/MIN/MMHG
EXPIRATORY TIME-POST: NORMAL SEC
EXPIRATORY TIME: NORMAL SEC
FEF 25-75% %CHNG: NORMAL
FEF 25-75% %PRED-POST: NORMAL %
FEF 25-75% %PRED-PRE: NORMAL L/SEC
FEF 25-75% PRED: NORMAL L/SEC
FEF 25-75%-POST: NORMAL L/SEC
FEF 25-75%-PRE: NORMAL L/SEC
FEV1 %PRED-POST: 67 %
FEV1 %PRED-PRE: 60 %
FEV1 PRED: NORMAL L
FEV1-POST: NORMAL L
FEV1-PRE: NORMAL L
FEV1/FVC %PRED-POST: NORMAL %
FEV1/FVC %PRED-PRE: NORMAL %
FEV1/FVC PRED: NORMAL %
FEV1/FVC-POST: 64 %
FEV1/FVC-PRE: 64 %
FVC %PRED-POST: NORMAL L
FVC %PRED-PRE: NORMAL %
FVC PRED: NORMAL L
FVC-POST: NORMAL L
FVC-PRE: NORMAL L
GAW %PRED: NORMAL %
GAW PRED: NORMAL L/S/CMH2O
GAW: NORMAL L/S/CMH2O
IC %PRED: NORMAL %
IC PRED: NORMAL L
IC: NORMAL L
MEP: NORMAL
MIP: NORMAL
MVV %PRED-PRE: NORMAL %
MVV PRED: NORMAL L/MIN
MVV-PRE: NORMAL L/MIN
PEF %PRED-POST: NORMAL %
PEF %PRED-PRE: NORMAL L/SEC
PEF PRED: NORMAL L/SEC
PEF%CHNG: NORMAL
PEF-POST: NORMAL L/SEC
PEF-PRE: NORMAL L/SEC
RAW %PRED: NORMAL %
RAW PRED: NORMAL CMH2O/L/S
RAW: NORMAL CMH2O/L/S
RV %PRED: NORMAL %
RV PRED: NORMAL L
RV: NORMAL L
SVC %PRED: NORMAL %
SVC PRED: NORMAL L
SVC: NORMAL L
TLC %PRED: 90 %
TLC PRED: NORMAL L
TLC: NORMAL L
VA %PRED: NORMAL %
VA PRED: NORMAL L
VA: NORMAL L
VTG %PRED: NORMAL %
VTG PRED: NORMAL L
VTG: NORMAL L

## 2019-10-21 PROCEDURE — 94060 EVALUATION OF WHEEZING: CPT

## 2019-10-21 PROCEDURE — 94727 GAS DIL/WSHOT DETER LNG VOL: CPT

## 2019-10-21 PROCEDURE — 94729 DIFFUSING CAPACITY: CPT

## 2019-10-21 ASSESSMENT — PULMONARY FUNCTION TESTS
FEV1/FVC_POST: 64
FEV1/FVC_PRE: 64
FEV1_PERCENT_PREDICTED_PRE: 60
FEV1_PERCENT_PREDICTED_POST: 67

## 2019-10-25 DIAGNOSIS — I10 ESSENTIAL HYPERTENSION: ICD-10-CM

## 2019-11-06 ENCOUNTER — OFFICE VISIT (OUTPATIENT)
Dept: FAMILY MEDICINE CLINIC | Age: 69
End: 2019-11-06
Payer: MEDICARE

## 2019-11-06 VITALS
WEIGHT: 212.6 LBS | SYSTOLIC BLOOD PRESSURE: 110 MMHG | HEART RATE: 64 BPM | DIASTOLIC BLOOD PRESSURE: 78 MMHG | BODY MASS INDEX: 30.43 KG/M2 | HEIGHT: 70 IN

## 2019-11-06 DIAGNOSIS — J43.9 PULMONARY EMPHYSEMA, UNSPECIFIED EMPHYSEMA TYPE (HCC): Primary | ICD-10-CM

## 2019-11-06 PROCEDURE — 3023F SPIROM DOC REV: CPT | Performed by: FAMILY MEDICINE

## 2019-11-06 PROCEDURE — G8484 FLU IMMUNIZE NO ADMIN: HCPCS | Performed by: FAMILY MEDICINE

## 2019-11-06 PROCEDURE — 1036F TOBACCO NON-USER: CPT | Performed by: FAMILY MEDICINE

## 2019-11-06 PROCEDURE — G8598 ASA/ANTIPLAT THER USED: HCPCS | Performed by: FAMILY MEDICINE

## 2019-11-06 PROCEDURE — G8926 SPIRO NO PERF OR DOC: HCPCS | Performed by: FAMILY MEDICINE

## 2019-11-06 PROCEDURE — 1123F ACP DISCUSS/DSCN MKR DOCD: CPT | Performed by: FAMILY MEDICINE

## 2019-11-06 PROCEDURE — 3017F COLORECTAL CA SCREEN DOC REV: CPT | Performed by: FAMILY MEDICINE

## 2019-11-06 PROCEDURE — G8417 CALC BMI ABV UP PARAM F/U: HCPCS | Performed by: FAMILY MEDICINE

## 2019-11-06 PROCEDURE — 4040F PNEUMOC VAC/ADMIN/RCVD: CPT | Performed by: FAMILY MEDICINE

## 2019-11-06 PROCEDURE — 99213 OFFICE O/P EST LOW 20 MIN: CPT | Performed by: FAMILY MEDICINE

## 2019-11-06 PROCEDURE — G8427 DOCREV CUR MEDS BY ELIG CLIN: HCPCS | Performed by: FAMILY MEDICINE

## 2019-11-06 ASSESSMENT — ENCOUNTER SYMPTOMS
TROUBLE SWALLOWING: 0
NAUSEA: 0
ABDOMINAL PAIN: 0
COUGH: 1
CHEST TIGHTNESS: 0
EYE PAIN: 0
DIARRHEA: 0
BLOOD IN STOOL: 0
WHEEZING: 1
VOMITING: 0
SHORTNESS OF BREATH: 1

## 2019-12-02 ENCOUNTER — TELEPHONE (OUTPATIENT)
Dept: FAMILY MEDICINE CLINIC | Age: 69
End: 2019-12-02

## 2019-12-13 DIAGNOSIS — I21.4 NSTEMI (NON-ST ELEVATED MYOCARDIAL INFARCTION) (HCC): ICD-10-CM

## 2019-12-13 DIAGNOSIS — Z95.1 S/P CABG X 4: ICD-10-CM

## 2019-12-13 DIAGNOSIS — Z72.0 TOBACCO ABUSE: ICD-10-CM

## 2019-12-13 DIAGNOSIS — I10 HYPERTENSION: ICD-10-CM

## 2019-12-13 RX ORDER — CLOPIDOGREL BISULFATE 75 MG/1
TABLET ORAL
Qty: 45 TABLET | Refills: 0 | Status: SHIPPED | OUTPATIENT
Start: 2019-12-13 | End: 2020-03-06 | Stop reason: SDUPTHER

## 2019-12-23 ENCOUNTER — TELEPHONE (OUTPATIENT)
Dept: CARDIOLOGY CLINIC | Age: 69
End: 2019-12-23

## 2019-12-26 ENCOUNTER — TELEPHONE (OUTPATIENT)
Dept: FAMILY MEDICINE CLINIC | Age: 69
End: 2019-12-26

## 2020-01-08 ENCOUNTER — TELEPHONE (OUTPATIENT)
Dept: FAMILY MEDICINE CLINIC | Age: 70
End: 2020-01-08

## 2020-01-13 ENCOUNTER — TELEPHONE (OUTPATIENT)
Dept: FAMILY MEDICINE CLINIC | Age: 70
End: 2020-01-13

## 2020-02-05 ENCOUNTER — TELEPHONE (OUTPATIENT)
Dept: CARDIOLOGY CLINIC | Age: 70
End: 2020-02-05

## 2020-02-07 ENCOUNTER — TELEPHONE (OUTPATIENT)
Dept: CARDIOLOGY CLINIC | Age: 70
End: 2020-02-07

## 2020-02-07 NOTE — TELEPHONE ENCOUNTER
Per [email or web address], PA already on file, no further PA needed. Patients spouse advised that the RX will be available at [facility] on Monday. She voiced understanding.

## 2020-03-06 ENCOUNTER — OFFICE VISIT (OUTPATIENT)
Dept: FAMILY MEDICINE CLINIC | Age: 70
End: 2020-03-06
Payer: MEDICARE

## 2020-03-06 VITALS
OXYGEN SATURATION: 94 % | WEIGHT: 222 LBS | DIASTOLIC BLOOD PRESSURE: 86 MMHG | SYSTOLIC BLOOD PRESSURE: 138 MMHG | HEART RATE: 68 BPM | HEIGHT: 70 IN | BODY MASS INDEX: 31.78 KG/M2

## 2020-03-06 PROBLEM — I73.9 PVD (PERIPHERAL VASCULAR DISEASE) (HCC): Status: RESOLVED | Noted: 2019-03-26 | Resolved: 2020-03-06

## 2020-03-06 PROCEDURE — 90732 PPSV23 VACC 2 YRS+ SUBQ/IM: CPT | Performed by: FAMILY MEDICINE

## 2020-03-06 PROCEDURE — 99214 OFFICE O/P EST MOD 30 MIN: CPT | Performed by: FAMILY MEDICINE

## 2020-03-06 PROCEDURE — G0009 ADMIN PNEUMOCOCCAL VACCINE: HCPCS | Performed by: FAMILY MEDICINE

## 2020-03-06 RX ORDER — SILDENAFIL 100 MG/1
100 TABLET, FILM COATED ORAL PRN
Qty: 30 TABLET | Refills: 1 | Status: SHIPPED | OUTPATIENT
Start: 2020-03-06 | End: 2021-03-01 | Stop reason: SDUPTHER

## 2020-03-06 RX ORDER — ALBUTEROL SULFATE 90 UG/1
2 AEROSOL, METERED RESPIRATORY (INHALATION) 4 TIMES DAILY PRN
Qty: 1 INHALER | Refills: 2 | Status: SHIPPED | OUTPATIENT
Start: 2020-03-06 | End: 2021-09-01 | Stop reason: SDUPTHER

## 2020-03-06 RX ORDER — PANTOPRAZOLE SODIUM 20 MG/1
20 TABLET, DELAYED RELEASE ORAL
Qty: 90 TABLET | Refills: 1 | Status: SHIPPED | OUTPATIENT
Start: 2020-03-06 | End: 2020-10-13

## 2020-03-06 RX ORDER — AMLODIPINE BESYLATE 5 MG/1
5 TABLET ORAL EVERY MORNING
Qty: 90 TABLET | Refills: 1 | Status: SHIPPED | OUTPATIENT
Start: 2020-03-06 | End: 2020-10-07

## 2020-03-06 RX ORDER — LISINOPRIL 20 MG/1
20 TABLET ORAL DAILY
Qty: 90 TABLET | Refills: 1 | Status: SHIPPED | OUTPATIENT
Start: 2020-03-06 | End: 2020-11-05

## 2020-03-06 RX ORDER — CARVEDILOL 12.5 MG/1
12.5 TABLET ORAL 2 TIMES DAILY WITH MEALS
Qty: 180 TABLET | Refills: 1 | Status: SHIPPED | OUTPATIENT
Start: 2020-03-06 | End: 2020-11-12

## 2020-03-06 RX ORDER — CLOPIDOGREL BISULFATE 75 MG/1
TABLET ORAL
Qty: 45 TABLET | Refills: 1 | Status: SHIPPED | OUTPATIENT
Start: 2020-03-06 | End: 2020-09-17

## 2020-03-06 ASSESSMENT — PATIENT HEALTH QUESTIONNAIRE - PHQ9
SUM OF ALL RESPONSES TO PHQ QUESTIONS 1-9: 0
SUM OF ALL RESPONSES TO PHQ9 QUESTIONS 1 & 2: 0
SUM OF ALL RESPONSES TO PHQ QUESTIONS 1-9: 0
2. FEELING DOWN, DEPRESSED OR HOPELESS: 0
1. LITTLE INTEREST OR PLEASURE IN DOING THINGS: 0

## 2020-03-06 ASSESSMENT — ENCOUNTER SYMPTOMS
EYE PAIN: 0
SHORTNESS OF BREATH: 1
DIARRHEA: 0
BLOOD IN STOOL: 0
ABDOMINAL PAIN: 0
CHEST TIGHTNESS: 0
NAUSEA: 0
VOMITING: 0
TROUBLE SWALLOWING: 0
WHEEZING: 0

## 2020-03-06 NOTE — PROGRESS NOTES
(chronic obstructive pulmonary disease) ([facility] [address]     Essential hypertension     H/O cardiovascular stress test 5/29/2014    cardiolite-normal, no ischemia, EF 60%    H/O cardiovascular stress test 1/18/2015    cardiolite-normal, EF51%    H/O cardiovascular stress test 02/12/2018    EF60% normal study    H/O Doppler lower arterial ultrasound 04/22/2019    No significant occlusive arterial disease, Patent right femoral popliteal bypass graft with biphasic waveforms, bilateral abis show mild peripheral arterial disease    H/O Doppler ultrasound 6/1/2016    Lower Ext Arterial- normal study, mild stenosis in RLE    H/O echocardiogram 5/28/2014    EF35-45% mild MR, mild AV calcification    H/O echocardiogram 09/25/2019    EF 61-44%, grade I diastolic dysfunction, No significant valvular disease, no pericardial effusion     Heart attack ([facility] [address] 2014    History of cardiac cath 02/12/2019    PATENT RENAL ARTERIES DISTAL ABD AORTA ATHEROSCLEROTIC, ANEYRYSMAL, RCIA PARENT, REIA  PATENT, RT SFA OCCLUDED, DISTALLY RECONSTITUTES, 3 V RUNOFF,  LT MARIA ISABEL PATENT, CHRIS 70% TO 0%,  LT SFA 80% TO 0% WITH ATHRECTOMY TO PTA, LT POP PATENT,  3 V run off                        History of nuclear stress test 09/25/2019    EF 51%, Normal    History of percutaneous left heart catheterization 5/3/2014    RCA 99% distal stenosis EF55%    History of Peripheral Angiogram 03/07/2019    RCIA PATENT, REIA CALCIFIED 70% STENOSIS, RSFA OCCLUDED NO STUMP, PFA  PATENT FILL DISTAL SFA WITH COLLATERLS, RT POP PATENT, 3 V RUN OFF    Hyperlipidemia     Lumbosacral spondylosis     Primary erectile dysfunction     S/P CABG x 4 2014    vein graft diagonal coronary artery,vein graft distal RCA,vein graft obtuse marginal coronary artery and left internal mammary artery to the left anterior descending    Shortness of breath on exertion     Teeth missing     Upper And Lower    Wears dentures     Full Upper    Wears glasses        FAMILY HISTORY  Family History   Adopted: Yes   Problem Relation Age of Onset    Heart Disease Mother     Hypertension Mother     Diabetes Mother        SOCIAL HISTORY  Social History     Socioeconomic History    Marital status:      Spouse name: None    Number of children: None    Years of education: None    Highest education level: None   Occupational History    None   Social Needs    Financial resource strain: None    Food insecurity:     Worry: None     Inability: None    Transportation needs:     Medical: None     Non-medical: None   Tobacco Use    Smoking status: Former Smoker     Packs/day: 1.00     Years: 55.00     Pack years: 55.00     Types: Cigarettes, Pipe, Cigars     Start date:      Last attempt to quit: 2014     Years since quittin.1    Smokeless tobacco: Former User     Types: [address] date:    Substance and Sexual Activity    Alcohol use: No    Drug use: No    Sexual activity: Not Currently   Lifestyle    Physical activity:     Days per week: None     Minutes per session: None    Stress: None   Relationships    Social connections:     Talks on phone: None     Gets together: None     Attends Orthodoxy service: None     Active member of club or organization: None     Attends meetings of clubs or organizations: None     Relationship status: None    Intimate partner violence:     Fear of current or ex partner: None     Emotionally abused: None     Physically abused: None     Forced sexual activity: None   Other Topics Concern    None   Social History Narrative    None        SURGICAL HISTORY  Past Surgical History:   Procedure Laterality Date    CARDIAC SURGERY      CABG (4 Bypasses)    COLONOSCOPY  Last Done In 2019    Polyps Removed In Past    DENTAL SURGERY      Teeth Extracted In Past    FEMORAL BYPASS Right 3/25/2019    RIGHT FEMORAL POPLITEAL BYPASS performed by Mahendra Mcnulty MD at [address] Left 2019    \"Stent\"    SKIN CANCER HENT:      Head: Normocephalic. Nose: Nose normal.      Mouth/Throat:      Pharynx: No oropharyngeal exudate. Eyes:      Conjunctiva/sclera: Conjunctivae normal.      Pupils: Pupils are equal, round, and reactive to light. Neck:      Musculoskeletal: Neck supple. Thyroid: No thyromegaly. Trachea: No tracheal deviation. Cardiovascular:      Rate and Rhythm: Normal rate and regular rhythm. Heart sounds: Normal heart sounds. No murmur. Pulmonary:      Effort: Pulmonary effort is normal. No respiratory distress. Breath sounds: Normal breath sounds. No wheezing or rales. Abdominal:      General: Bowel sounds are normal.      Palpations: Abdomen is soft. There is no mass. Tenderness: There is no abdominal tenderness. Musculoskeletal: Normal range of motion. General: No deformity. Lymphadenopathy:      Cervical: No cervical adenopathy. Skin:     General: Skin is warm. Findings: No rash. Neurological:      Mental Status: He is alert and oriented to person, place, and time. Cranial Nerves: No cranial nerve deficit. Coordination: Coordination normal.   Psychiatric:         Thought Content: Thought content normal.         ASSESSMENT & PLAN     Diagnosis Orders   1. Atherosclerosis of native coronary artery of native heart without angina pectoris     2. Gastroesophageal reflux disease without esophagitis  pantoprazole (PROTONIX) 20 MG tablet   3. Essential hypertension  lisinopril (PRINIVIL;ZESTRIL) 20 MG tablet    carvedilol (COREG) 12.5 MG tablet    amLODIPine (NORVASC) 5 MG tablet   4. S/P CABG x 4  clopidogrel (PLAVIX) 75 MG tablet   5. Pulmonary emphysema, unspecified emphysema type ([address]     Overall Aleksandra Rosado is doing quite well. He is encouraged to continue to work on regular activity. Will provide refills and was given Pneumovax 23 today. Return in about 6 months (around 9/6/2020).          Electronically signed by Didi Brewer MD on

## 2020-04-10 ENCOUNTER — TELEMEDICINE (OUTPATIENT)
Dept: CARDIOLOGY CLINIC | Age: 70
End: 2020-04-10
Payer: MEDICARE

## 2020-04-10 VITALS
SYSTOLIC BLOOD PRESSURE: 141 MMHG | HEIGHT: 71 IN | DIASTOLIC BLOOD PRESSURE: 90 MMHG | BODY MASS INDEX: 30.66 KG/M2 | WEIGHT: 219 LBS | HEART RATE: 71 BPM

## 2020-04-10 PROCEDURE — 4040F PNEUMOC VAC/ADMIN/RCVD: CPT | Performed by: INTERNAL MEDICINE

## 2020-04-10 PROCEDURE — 3017F COLORECTAL CA SCREEN DOC REV: CPT | Performed by: INTERNAL MEDICINE

## 2020-04-10 PROCEDURE — 99214 OFFICE O/P EST MOD 30 MIN: CPT | Performed by: INTERNAL MEDICINE

## 2020-04-10 PROCEDURE — G8427 DOCREV CUR MEDS BY ELIG CLIN: HCPCS | Performed by: INTERNAL MEDICINE

## 2020-04-10 PROCEDURE — 1123F ACP DISCUSS/DSCN MKR DOCD: CPT | Performed by: INTERNAL MEDICINE

## 2020-04-10 RX ORDER — TIOTROPIUM BROMIDE AND OLODATEROL 3.124; 2.736 UG/1; UG/1
SPRAY, METERED RESPIRATORY (INHALATION)
COMMUNITY
End: 2020-08-28

## 2020-04-10 RX ORDER — EVOLOCUMAB 140 MG/ML
140 INJECTION, SOLUTION SUBCUTANEOUS
Qty: 6 SYRINGE | Refills: 0 | Status: SHIPPED | OUTPATIENT
Start: 2020-04-10 | End: 2020-05-27 | Stop reason: SDUPTHER

## 2020-04-10 NOTE — PROGRESS NOTES
ATHEROSCLEROTIC, ANEYRYSMAL, RCIA PARENT, REIA  PATENT, RT SFA OCCLUDED, DISTALLY RECONSTITUTES, 3 V RUNOFF,  LT MARIA ISABEL PATENT, CHRIS 70% TO 0%,  LT SFA 80% TO 0% WITH ATHRECTOMY TO PTA, LT POP PATENT,  3 V run off                        History of nuclear stress test 2019    EF 51%, Normal    History of percutaneous left heart catheterization 5/3/2014    RCA 99% distal stenosis EF55%    History of Peripheral Angiogram 2019    RCIA PATENT, REIA CALCIFIED 70% STENOSIS, RSFA OCCLUDED NO STUMP, PFA  PATENT FILL DISTAL SFA WITH COLLATERLS, RT POP PATENT, 3 V RUN OFF    Hyperlipidemia     Lumbosacral spondylosis     Primary erectile dysfunction     S/P CABG x 4     vein graft diagonal coronary artery,vein graft distal RCA,vein graft obtuse marginal coronary artery and left internal mammary artery to the left anterior descending    Shortness of breath on exertion     Teeth missing     Upper And Lower    Wears dentures     Full Upper    Wears glasses      Past Surgical History:   Procedure Laterality Date    CARDIAC SURGERY      CABG (4 Bypasses)    COLONOSCOPY  Last Done In     Polyps Removed In Past    DENTAL SURGERY      Teeth Extracted In Past    FEMORAL BYPASS Right 3/25/2019    RIGHT FEMORAL POPLITEAL BYPASS performed by Sharla Colon MD at [address] [facility] Left 2019    \"Stent\"    SKIN CANCER EXCISION      Basal Cell Skin Cancer Excised Nose    TONSILLECTOMY AND ADENOIDECTOMY  65's      As reviewed   Family History   Adopted: Yes   Problem Relation Age of Onset    Heart Disease Mother     Hypertension Mother     Diabetes Mother      Social History     Tobacco Use    Smoking status: Former Smoker     Packs/day: 1.00     Years: 55.00     Pack years: 55.00     Types: Cigarettes, Pipe, Cigars     Start date:      Last attempt to quit: 2014     Years since quittin.2    Smokeless tobacco: Former User     Types: [address] date:    Substance Use Topics    Alcohol use: No      Review of Systems:    Constitutional: Negative for diaphoresis and fatigue  Psychological:Negative for anxiety or depression  HENT: Negative for headaches, nasal congestion, sinus pain or vertigo  Eyes: Negative for visual disturbance. Endocrine: Negative for polydipsia/polyuria  Respiratory: Negative for shortness of breath  Cardiovascular: Negative for chest pain, dyspnea on exertion, claudication, edema, irregular heartbeat, murmur, palpitations or shortness of breath  Gastrointestinal: Negative for abdominal pain or heartburn  Genito-Urinary: Negative for urinary frequency/urgency  Musculoskeletal: Negative for muscle pain, muscular weakness, negative for pain in arm and leg or swelling in foot and leg  Neurological: Negative for dizziness, headaches, memory loss, numbness/tingling, visual changes, syncope  Dermatological: Negative for rash    Objective:  BP (!) 141/90 (Site: Right Upper Arm, Position: Sitting, Cuff Size: Large Adult)   Pulse 71   Ht 5' 11\" (1.803 m)   Wt 219 lb (99.3 kg)   BMI 30.54 kg/m²   Wt Readings from Last 3 Encounters:   04/10/20 219 lb (99.3 kg)   03/06/20 222 lb (100.7 kg)   11/06/19 212 lb 9.6 oz (96.4 kg)     Body mass index is 30.54 kg/m². GENERAL - Alert, oriented, pleasant, in no apparent distress. EYES: No jaundice, no conjunctival pallor. SKIN: It is warm & dry. No rashes. No Echhymosis    HEENT - No clinically significant abnormalities seen. Neck - Supple. No jugular venous distention noted. No carotid bruits. Cardiovascular - Normal S1 and S2 without obvious murmur or gallop. Extremities - No cyanosis, clubbing, or significant edema. Pulmonary - No respiratory distress. No wheezes or rales. Abdomen - No masses, tenderness, or organomegaly. Musculoskeletal - No significant edema. No joint deformities. No muscle wasting. Neurologic - Cranial nerves II through XII are grossly intact.   There were no gross focal neurologic

## 2020-04-10 NOTE — LETTER
[facility] [place]    Dr. Eulice Scheuermann  1950  [id number]    Have you had any Chest Pain - No    Have you had any Shortness of Breath - Yes  If Yes - When moving    Have you had any dizziness - No      Have you had any palpitations - No    Is the patient on any of the following medications - none  If Yes DO EKG    Do you have any edema - swelling in none2      Check Venous \"LEG PROBLEM Questionnaire\"    Do you have a surgery or procedure scheduled in the near future - No      Ask patient [facility] if [facility] are not already [facility]    Check to see if we have an E-MAIL on file for the patient    Check medication list thoroughly!!!

## 2020-05-27 ENCOUNTER — TELEPHONE (OUTPATIENT)
Dept: CARDIOLOGY CLINIC | Age: 70
End: 2020-05-27

## 2020-05-27 RX ORDER — EVOLOCUMAB 140 MG/ML
140 INJECTION, SOLUTION SUBCUTANEOUS
Qty: 1 SYRINGE | Refills: 0 | COMMUNITY
Start: 2020-05-27 | End: 2020-06-15 | Stop reason: SDUPTHER

## 2020-05-29 ENCOUNTER — TELEPHONE (OUTPATIENT)
Dept: CARDIOLOGY CLINIC | Age: 70
End: 2020-05-29

## 2020-05-29 NOTE — TELEPHONE ENCOUNTER
----- Message from Beverlyn Koyanagi, MA sent at 5/28/2020 12:22 PM EDT -----  Patient in donut hole - gave 1 repatha sample.  Patient brought in safety net papers-in you folder

## 2020-06-02 ENCOUNTER — TELEPHONE (OUTPATIENT)
Dept: CARDIOLOGY CLINIC | Age: 70
End: 2020-06-02

## 2020-06-11 NOTE — TELEPHONE ENCOUNTER
Patient Assistance was denied through [address] [place] [address], assistance request submitted through [employer] via [employer]. Awaiting response. Confirmation #[id number] for application.

## 2020-06-15 RX ORDER — EVOLOCUMAB 140 MG/ML
140 INJECTION, SOLUTION SUBCUTANEOUS
Qty: 2 SYRINGE | Refills: 5 | Status: SHIPPED | OUTPATIENT
Start: 2020-06-15 | End: 2021-01-12 | Stop reason: SDUPTHER

## 2020-08-28 ENCOUNTER — OFFICE VISIT (OUTPATIENT)
Dept: FAMILY MEDICINE CLINIC | Age: 70
End: 2020-08-28
Payer: MEDICARE

## 2020-08-28 VITALS
TEMPERATURE: 97.6 F | OXYGEN SATURATION: 94 % | HEART RATE: 70 BPM | HEIGHT: 71 IN | WEIGHT: 219.6 LBS | DIASTOLIC BLOOD PRESSURE: 88 MMHG | SYSTOLIC BLOOD PRESSURE: 124 MMHG | BODY MASS INDEX: 30.74 KG/M2

## 2020-08-28 DIAGNOSIS — I10 ESSENTIAL HYPERTENSION: ICD-10-CM

## 2020-08-28 DIAGNOSIS — Z11.59 NEED FOR HEPATITIS C SCREENING TEST: ICD-10-CM

## 2020-08-28 DIAGNOSIS — I25.118 ATHEROSCLEROTIC HEART DISEASE OF NATIVE CORONARY ARTERY WITH OTHER FORMS OF ANGINA PECTORIS (HCC): ICD-10-CM

## 2020-08-28 DIAGNOSIS — R73.9 HYPERGLYCEMIA: ICD-10-CM

## 2020-08-28 PROBLEM — K21.9 GERD (GASTROESOPHAGEAL REFLUX DISEASE): Status: ACTIVE | Noted: 2020-08-28

## 2020-08-28 LAB
A/G RATIO: 1.5 (ref 1.1–2.2)
ALBUMIN SERPL-MCNC: 4.3 G/DL (ref 3.4–5)
ALP BLD-CCNC: 74 U/L (ref 40–129)
ALT SERPL-CCNC: 18 U/L (ref 10–40)
ANION GAP SERPL CALCULATED.3IONS-SCNC: 11 MMOL/L (ref 3–16)
AST SERPL-CCNC: 22 U/L (ref 15–37)
BILIRUB SERPL-MCNC: 0.6 MG/DL (ref 0–1)
BUN BLDV-MCNC: 19 MG/DL (ref 7–20)
CALCIUM SERPL-MCNC: 9.7 MG/DL (ref 8.3–10.6)
CHLORIDE BLD-SCNC: 101 MMOL/L (ref 99–110)
CHOLESTEROL, TOTAL: 176 MG/DL (ref 0–199)
CO2: 26 MMOL/L (ref 21–32)
CREAT SERPL-MCNC: 1 MG/DL (ref 0.8–1.3)
GFR AFRICAN AMERICAN: >60
GFR NON-AFRICAN AMERICAN: >60
GLOBULIN: 2.8 G/DL
GLUCOSE BLD-MCNC: 107 MG/DL (ref 70–99)
HCT VFR BLD CALC: 47.2 % (ref 40.5–52.5)
HDLC SERPL-MCNC: 55 MG/DL (ref 40–60)
HEMOGLOBIN: 15.9 G/DL (ref 13.5–17.5)
HEPATITIS C ANTIBODY INTERPRETATION: NORMAL
LDL CHOLESTEROL CALCULATED: 102 MG/DL
MCH RBC QN AUTO: 33 PG (ref 26–34)
MCHC RBC AUTO-ENTMCNC: 33.7 G/DL (ref 31–36)
MCV RBC AUTO: 98 FL (ref 80–100)
PDW BLD-RTO: 14.2 % (ref 12.4–15.4)
PLATELET # BLD: 262 K/UL (ref 135–450)
PMV BLD AUTO: 6.8 FL (ref 5–10.5)
POTASSIUM SERPL-SCNC: 5 MMOL/L (ref 3.5–5.1)
RBC # BLD: 4.82 M/UL (ref 4.2–5.9)
SODIUM BLD-SCNC: 138 MMOL/L (ref 136–145)
TOTAL PROTEIN: 7.1 G/DL (ref 6.4–8.2)
TRIGL SERPL-MCNC: 97 MG/DL (ref 0–150)
VLDLC SERPL CALC-MCNC: 19 MG/DL
WBC # BLD: 7.1 K/UL (ref 4–11)

## 2020-08-28 PROCEDURE — 99214 OFFICE O/P EST MOD 30 MIN: CPT | Performed by: FAMILY MEDICINE

## 2020-08-28 PROCEDURE — G8417 CALC BMI ABV UP PARAM F/U: HCPCS | Performed by: FAMILY MEDICINE

## 2020-08-28 PROCEDURE — 3017F COLORECTAL CA SCREEN DOC REV: CPT | Performed by: FAMILY MEDICINE

## 2020-08-28 PROCEDURE — 1036F TOBACCO NON-USER: CPT | Performed by: FAMILY MEDICINE

## 2020-08-28 PROCEDURE — G8926 SPIRO NO PERF OR DOC: HCPCS | Performed by: FAMILY MEDICINE

## 2020-08-28 PROCEDURE — 1123F ACP DISCUSS/DSCN MKR DOCD: CPT | Performed by: FAMILY MEDICINE

## 2020-08-28 PROCEDURE — 3023F SPIROM DOC REV: CPT | Performed by: FAMILY MEDICINE

## 2020-08-28 PROCEDURE — 4040F PNEUMOC VAC/ADMIN/RCVD: CPT | Performed by: FAMILY MEDICINE

## 2020-08-28 PROCEDURE — G8427 DOCREV CUR MEDS BY ELIG CLIN: HCPCS | Performed by: FAMILY MEDICINE

## 2020-08-28 ASSESSMENT — ENCOUNTER SYMPTOMS
TROUBLE SWALLOWING: 0
CHEST TIGHTNESS: 0
ABDOMINAL PAIN: 0
WHEEZING: 0
DIARRHEA: 0
NAUSEA: 0
EYE PAIN: 0
VOMITING: 0
BLOOD IN STOOL: 0
SHORTNESS OF BREATH: 1

## 2020-08-28 NOTE — PROGRESS NOTES
8/28/2020    Merline Sark    Chief Complaint   Patient presents with    6 Month Follow-Up       HPI  History was obtained from the patient. Alphonse Code is a 69 y.o. male who presents today with routine follow-up on coronary disease/bypass grafting by history, osteoarthritis, COPD, GE reflux, and hypertension. He states he is being careful to socially distance but is not had any chest pain. He does have some shortness of breath when he is active out in the heat and humidity. He is trying to ride his bike fairly regularly. GERD symptoms are basically for the most part reasonable. Blood pressure meds are reasonable at home and here. Mood is remained good states meds are well-tolerated. On review he is due for labs as well as refills. REVIEW OF SYMPTOMS    Review of Systems   Constitutional: Negative for activity change and fatigue. HENT: Negative for congestion, hearing loss, mouth sores and trouble swallowing. Eyes: Negative for pain and visual disturbance. Respiratory: Positive for shortness of breath. Negative for chest tightness and wheezing. Cardiovascular: Negative for chest pain and palpitations. Gastrointestinal: Negative for abdominal pain, blood in stool, diarrhea, nausea and vomiting. Endocrine: Negative for polydipsia and polyuria. Genitourinary: Negative for dysuria, frequency and urgency. Musculoskeletal: Negative for arthralgias, gait problem and neck stiffness. Skin: Negative for rash. Allergic/Immunologic: Negative for environmental allergies. Neurological: Negative for dizziness, seizures, speech difficulty and weakness. Hematological: Does not bruise/bleed easily. Psychiatric/Behavioral: Negative for agitation, confusion and hallucinations. The patient is not nervous/anxious and is not hyperactive.         PAST MEDICAL HISTORY  Past Medical History:   Diagnosis Date    Acid reflux     Basal Cell Skin Cancer Excised Nose 1992    Basal Cell Skin Cancer Excised Nose    CAD (coronary artery disease)     Sees Dr. Soni Donaldson COPD (chronic obstructive pulmonary disease) ([facility] [address]     Essential hypertension     H/O cardiovascular stress test 5/29/2014    cardiolite-normal, no ischemia, EF 60%    H/O cardiovascular stress test 1/18/2015    cardiolite-normal, EF51%    H/O cardiovascular stress test 02/12/2018    EF60% normal study    H/O Doppler lower arterial ultrasound 04/22/2019    No significant occlusive arterial disease, Patent right femoral popliteal bypass graft with biphasic waveforms, bilateral abis show mild peripheral arterial disease    H/O Doppler ultrasound 6/1/2016    Lower Ext Arterial- normal study, mild stenosis in RLE    H/O echocardiogram 5/28/2014    EF35-45% mild MR, mild AV calcification    H/O echocardiogram 09/25/2019    EF 41-17%, grade I diastolic dysfunction, No significant valvular disease, no pericardial effusion     Heart attack ([facility] [address] 2014    History of cardiac cath 02/12/2019    PATENT RENAL ARTERIES DISTAL ABD AORTA ATHEROSCLEROTIC, ANEYRYSMAL, RCIA PARENT, REIA  PATENT, RT SFA OCCLUDED, DISTALLY RECONSTITUTES, 3 V RUNOFF,  LT MARIA ISABEL PATENT, CHRIS 70% TO 0%,  LT SFA 80% TO 0% WITH ATHRECTOMY TO PTA, LT POP PATENT,  3 V run off                        History of nuclear stress test 09/25/2019    EF 51%, Normal    History of percutaneous left heart catheterization 5/3/2014    RCA 99% distal stenosis EF55%    History of Peripheral Angiogram 03/07/2019    RCIA PATENT, REIA CALCIFIED 70% STENOSIS, RSFA OCCLUDED NO STUMP, PFA  PATENT FILL DISTAL SFA WITH COLLATERLS, RT POP PATENT, 3 V RUN OFF    Hyperlipidemia     Lumbosacral spondylosis     Primary erectile dysfunction     S/P CABG x 4 2014    vein graft diagonal coronary artery,vein graft distal RCA,vein graft obtuse marginal coronary artery and left internal mammary artery to the left anterior descending    Shortness of breath on exertion     Teeth missing     Upper And Lower    Wears dentures     Full Upper    Wears glasses        FAMILY HISTORY  Family History   Adopted: Yes   Problem Relation Age of Onset    Heart Disease Mother     Hypertension Mother     Diabetes Mother        SOCIAL HISTORY  Social History     Socioeconomic History    Marital status:      Spouse name: None    Number of children: None    Years of education: None    Highest education level: None   Occupational History    None   Social Needs    Financial resource strain: None    Food insecurity     Worry: None     Inability: None    Transportation needs     Medical: None     Non-medical: None   Tobacco Use    Smoking status: Former Smoker     Packs/day: 1.00     Years: 55.00     Pack years: 55.00     Types: Cigarettes, Pipe, Cigars     Start date:      Last attempt to quit: 2014     Years since quittin.6    Smokeless tobacco: Former User     Types: [address] date:    Substance and Sexual Activity    Alcohol use: No    Drug use: No    Sexual activity: Not Currently   Lifestyle    Physical activity     Days per week: None     Minutes per session: None    Stress: None   Relationships    Social connections     Talks on phone: None     Gets together: None     Attends Yazdanism service: None     Active member of club or organization: None     Attends meetings of clubs or organizations: None     Relationship status: None    Intimate partner violence     Fear of current or ex partner: None     Emotionally abused: None     Physically abused: None     Forced sexual activity: None   Other Topics Concern    None   Social History Narrative    None        SURGICAL HISTORY  Past Surgical History:   Procedure Laterality Date    CARDIAC SURGERY      CABG (4 Bypasses)    COLONOSCOPY  Last Done In 2019    Polyps Removed In Past    DENTAL SURGERY      Teeth Extracted In Past    FEMORAL BYPASS Right 3/25/2019    RIGHT FEMORAL POPLITEAL BYPASS performed by Damian Leong MD at [place] [address] SURGERY Left 02/12/2019    \"Stent\"    SKIN CANCER EXCISION  1992    Basal Cell Skin Cancer Excised Nose    TONSILLECTOMY AND ADENOIDECTOMY  1950's                 CURRENT MEDICATIONS  Current Outpatient Medications   Medication Sig Dispense Refill    Evolocumab (REPATHA) 140 MG/ML SOSY Inject 1 mL into the skin every 14 days 2 Syringe 5    sildenafil (VIAGRA) 100 MG tablet Take 1 tablet by mouth as needed for Erectile Dysfunction 30 tablet 1    Tiotropium Bromide-Olodaterol (STIOLTO RESPIMAT) 2.5-2.5 MCG/ACT AERS Inhale 2 actuation into the lungs daily 1 Inhaler 5    pantoprazole (PROTONIX) 20 MG tablet Take 1 tablet by mouth every morning (before breakfast) 90 tablet 1    lisinopril (PRINIVIL;ZESTRIL) 20 MG tablet Take 1 tablet by mouth daily 90 tablet 1    clopidogrel (PLAVIX) 75 MG tablet TAKE 1/2 (ONE-HALF) TABLET BY MOUTH ONCE DAILY 45 tablet 1    carvedilol (COREG) 12.5 MG tablet Take 1 tablet by mouth 2 times daily (with meals) 180 tablet 1    amLODIPine (NORVASC) 5 MG tablet Take 1 tablet by mouth every morning 90 tablet 1    albuterol sulfate  (90 Base) MCG/ACT inhaler Inhale 2 puffs into the lungs 4 times daily as needed for Wheezing 1 Inhaler 2    aspirin 81 MG tablet Take 81 mg by mouth nightly Over The Counter       No current facility-administered medications for this visit. ALLERGIES  Allergies   Allergen Reactions    Statins      \"Paralyzes Muscles And Causes Muscle Weakness\"    Lipitor [Atorvastatin Calcium]      Muscle weakness    Spiriva Handihaler [Tiotropium Bromide Monohydrate]      Hoarse voice       PHYSICAL EXAM    /88 (Site: Right Upper Arm, Position: Sitting, Cuff Size: Large Adult)   Pulse 70   Temp 97.6 °F (36.4 °C)   Ht 5' 11\" (1.803 m)   Wt 219 lb 9.6 oz (99.6 kg)   SpO2 94%   BMI 30.63 kg/m²     Physical Exam  Vitals signs and nursing note reviewed. Constitutional:       Appearance: Normal appearance. He is well-developed.  He is not ill-appearing. HENT:      Head: Normocephalic and atraumatic. Nose: Nose normal.      Mouth/Throat:      Mouth: Mucous membranes are moist.   Eyes:      Pupils: Pupils are equal, round, and reactive to light. Neck:      Musculoskeletal: Normal range of motion and neck supple. Cardiovascular:      Rate and Rhythm: Normal rate and regular rhythm. Heart sounds: Normal heart sounds. Pulmonary:      Effort: Pulmonary effort is normal. No respiratory distress. Breath sounds: Normal breath sounds. No wheezing. Abdominal:      Palpations: Abdomen is soft. Musculoskeletal: Normal range of motion. Skin:     General: Skin is warm and dry. Neurological:      General: No focal deficit present. Mental Status: He is alert and oriented to person, place, and time. Mental status is at baseline. Sensory: No sensory deficit. Psychiatric:         Mood and Affect: Mood normal.         Behavior: Behavior normal.         Thought Content: Thought content normal.         ASSESSMENT & PLAN     Diagnosis Orders   1. Essential hypertension  CBC    COMPREHENSIVE METABOLIC PANEL   2. Gastroesophageal reflux disease, esophagitis presence not specified     3. Atherosclerotic heart disease of native coronary artery with other forms of angina pectoris ([address]  LIPID PANEL   4. Pulmonary emphysema, unspecified emphysema type ([address]     5. Hyperglycemia  HEMOGLOBIN A1C   6. Need for hepatitis C screening test  HEPATITIS C ANTIBODY   7. Smoking  Low Dose Chest CT -Abnormal Lung Screen Follow up   Patient encouraged to continue with healthy lifestyle and social distancing. Agree with exercise as tolerated. Provide refills on multiple meds today, and we will check CBC, CMP, lipid panel, and TSH. Because of past history of extensive smoking will set up low-dose screening CT of chest.  He is to follow-up for all results. Return in about 6 months (around 2/28/2021).          Electronically signed by Marilyn Vigil Polina Vera MD on 8/28/2020

## 2020-08-29 LAB
ESTIMATED AVERAGE GLUCOSE: 134.1 MG/DL
HBA1C MFR BLD: 6.3 %

## 2020-09-01 ENCOUNTER — HOSPITAL ENCOUNTER (OUTPATIENT)
Dept: CT IMAGING | Age: 70
Discharge: HOME | End: 2020-09-01
Payer: MEDICARE

## 2020-09-01 PROCEDURE — G0297 LDCT FOR LUNG CA SCREEN: HCPCS

## 2020-09-02 ENCOUNTER — TELEPHONE (OUTPATIENT)
Dept: FAMILY MEDICINE CLINIC | Age: 70
End: 2020-09-02

## 2020-09-02 NOTE — TELEPHONE ENCOUNTER
----- Message from Brandi Chisholm MD sent at 9/1/2020  8:43 AM EDT -----  Please notify patient that screening CT chest(for phx of extensive smoking) was negative which is good news. Scan did show possible left kidney mass though (not completely evaluated on this scan )- we need to set up a CT of the kidneys with contrast to look at th this in more detail. Please place that order-thxs. Francisco Brian

## 2020-09-08 ENCOUNTER — HOSPITAL ENCOUNTER (OUTPATIENT)
Dept: CT IMAGING | Age: 70
Discharge: HOME | End: 2020-09-08
Payer: MEDICARE

## 2020-09-08 PROCEDURE — 2580000003 HC RX 258: Performed by: FAMILY MEDICINE

## 2020-09-08 PROCEDURE — 74170 CT ABD WO CNTRST FLWD CNTRST: CPT

## 2020-09-08 PROCEDURE — 6360000004 HC RX CONTRAST MEDICATION: Performed by: FAMILY MEDICINE

## 2020-09-08 RX ORDER — SODIUM CHLORIDE 0.9 % (FLUSH) 0.9 %
10 SYRINGE (ML) INJECTION ONCE
Status: COMPLETED | OUTPATIENT
Start: 2020-09-08 | End: 2020-09-08

## 2020-09-08 RX ADMIN — Medication 10 ML: at 09:14

## 2020-09-08 RX ADMIN — IOPAMIDOL 75 ML: 755 INJECTION, SOLUTION INTRAVENOUS at 09:14

## 2020-09-17 ENCOUNTER — HOSPITAL ENCOUNTER (OUTPATIENT)
Dept: ULTRASOUND IMAGING | Age: 70
Discharge: HOME | End: 2020-09-17
Payer: MEDICARE

## 2020-09-17 PROCEDURE — 93925 LOWER EXTREMITY STUDY: CPT

## 2020-10-13 ENCOUNTER — OFFICE VISIT (OUTPATIENT)
Dept: CARDIOLOGY CLINIC | Age: 70
End: 2020-10-13
Payer: MEDICARE

## 2020-10-13 VITALS
HEART RATE: 72 BPM | SYSTOLIC BLOOD PRESSURE: 118 MMHG | WEIGHT: 220 LBS | BODY MASS INDEX: 30.8 KG/M2 | DIASTOLIC BLOOD PRESSURE: 84 MMHG | HEIGHT: 71 IN

## 2020-10-13 PROCEDURE — 1123F ACP DISCUSS/DSCN MKR DOCD: CPT | Performed by: INTERNAL MEDICINE

## 2020-10-13 PROCEDURE — 99214 OFFICE O/P EST MOD 30 MIN: CPT | Performed by: INTERNAL MEDICINE

## 2020-10-13 PROCEDURE — G8417 CALC BMI ABV UP PARAM F/U: HCPCS | Performed by: INTERNAL MEDICINE

## 2020-10-13 PROCEDURE — G8427 DOCREV CUR MEDS BY ELIG CLIN: HCPCS | Performed by: INTERNAL MEDICINE

## 2020-10-13 PROCEDURE — 1036F TOBACCO NON-USER: CPT | Performed by: INTERNAL MEDICINE

## 2020-10-13 PROCEDURE — G8484 FLU IMMUNIZE NO ADMIN: HCPCS | Performed by: INTERNAL MEDICINE

## 2020-10-13 PROCEDURE — 3017F COLORECTAL CA SCREEN DOC REV: CPT | Performed by: INTERNAL MEDICINE

## 2020-10-13 PROCEDURE — 4040F PNEUMOC VAC/ADMIN/RCVD: CPT | Performed by: INTERNAL MEDICINE

## 2020-10-13 NOTE — PROGRESS NOTES
2.5-2.5 MCG/ACT AERS Inhale 2 actuation into the lungs daily 1 Inhaler 5    pantoprazole (PROTONIX) 20 MG tablet Take 1 tablet by mouth every morning (before breakfast) 90 tablet 1    lisinopril (PRINIVIL;ZESTRIL) 20 MG tablet Take 1 tablet by mouth daily 90 tablet 1    carvedilol (COREG) 12.5 MG tablet Take 1 tablet by mouth 2 times daily (with meals) 180 tablet 1    albuterol sulfate  (90 Base) MCG/ACT inhaler Inhale 2 puffs into the lungs 4 times daily as needed for Wheezing 1 Inhaler 2    aspirin 81 MG tablet Take 81 mg by mouth nightly Over The Counter       No current facility-administered medications for this visit. Allergies: Statins;  Lipitor [atorvastatin calcium]; and Spiriva handihaler [tiotropium bromide monohydrate]  Past Medical History:   Diagnosis Date    Acid reflux     Basal Cell Skin Cancer Excised Nose 1992    Basal Cell Skin Cancer Excised Nose    CAD (coronary artery disease)     Sees Dr. Flory Maria COPD (chronic obstructive pulmonary disease) ([facility] [address]     Essential hypertension     H/O cardiovascular stress test 5/29/2014    cardiolite-normal, no ischemia, EF 60%    H/O cardiovascular stress test 1/18/2015    cardiolite-normal, EF51%    H/O cardiovascular stress test 02/12/2018    EF60% normal study    H/O Doppler lower arterial ultrasound 04/22/2019    No significant occlusive arterial disease, Patent right femoral popliteal bypass graft with biphasic waveforms, bilateral abis show mild peripheral arterial disease    H/O Doppler ultrasound 6/1/2016    Lower Ext Arterial- normal study, mild stenosis in RLE    H/O echocardiogram 5/28/2014    EF35-45% mild MR, mild AV calcification    H/O echocardiogram 09/25/2019    EF 12-88%, grade I diastolic dysfunction, No significant valvular disease, no pericardial effusion     Heart attack ([facility] [address] 2014    History of cardiac cath 02/12/2019    PATENT RENAL ARTERIES DISTAL ABD AORTA ATHEROSCLEROTIC, ANEYRYSMAL, RCIA PARENT, GRIFFIN PATENT, RT SFA OCCLUDED, DISTALLY RECONSTITUTES, 3 V RUNOFF,  LT MARIA ISABEL PATENT, CHRIS 70% TO 0%,  LT SFA 80% TO 0% WITH ATHRECTOMY TO PTA, LT POP PATENT,  3 V run off                        History of nuclear stress test 2019    EF 51%, Normal    History of percutaneous left heart catheterization 5/3/2014    RCA 99% distal stenosis EF55%    History of Peripheral Angiogram 2019    RCIA PATENT, REIA CALCIFIED 70% STENOSIS, RSFA OCCLUDED NO STUMP, PFA  PATENT FILL DISTAL SFA WITH COLLATERLS, RT POP PATENT, 3 V RUN OFF    Hyperlipidemia     Lumbosacral spondylosis     Primary erectile dysfunction     S/P CABG x 4     vein graft diagonal coronary artery,vein graft distal RCA,vein graft obtuse marginal coronary artery and left internal mammary artery to the left anterior descending    Shortness of breath on exertion     Teeth missing     Upper And Lower    Wears dentures     Full Upper    Wears glasses      Past Surgical History:   Procedure Laterality Date    CARDIAC SURGERY      CABG (4 Bypasses)    COLONOSCOPY  Last Done In     Polyps Removed In Past    DENTAL SURGERY      Teeth Extracted In Past    FEMORAL BYPASS Right 3/25/2019    RIGHT FEMORAL POPLITEAL BYPASS performed by Donald Dover MD at [facility] Left 2019    \"Stent\"    SKIN CANCER EXCISION      Basal Cell Skin Cancer Excised Nose    TONSILLECTOMY AND ADENOIDECTOMY  65's      As reviewed   Family History   Adopted: Yes   Problem Relation Age of Onset    Heart Disease Mother     Hypertension Mother     Diabetes Mother      Social History     Tobacco Use    Smoking status: Former Smoker     Packs/day: 1.00     Years: 55.00     Pack years: 55.00     Types: Cigarettes, Pipe, Cigars     Start date:      Last attempt to quit:      Years since quittin.7    Smokeless tobacco: Former User     Types: Chew     Quit date:    Substance Use Topics    Alcohol use: No      Review of Systems: Constitutional: Negative for diaphoresis and fatigue  Psychological:Negative for anxiety or depression  HENT: Negative for headaches, nasal congestion, sinus pain or vertigo  Eyes: Negative for visual disturbance. Endocrine: Negative for polydipsia/polyuria  Respiratory: Negative for shortness of breath  Cardiovascular: Negative for chest pain, dyspnea on exertion, claudication, edema, irregular heartbeat, murmur, palpitations or shortness of breath  Gastrointestinal: Negative for abdominal pain or heartburn  Genito-Urinary: Negative for urinary frequency/urgency  Musculoskeletal: Negative for muscle pain, muscular weakness, negative for pain in arm and leg or swelling in foot and leg  Neurological: Negative for dizziness, headaches, memory loss, numbness/tingling, visual changes, syncope  Dermatological: Negative for rash    Objective:  /84   Pulse 72   Ht 5' 11\" (1.803 m)   Wt 220 lb (99.8 kg)   BMI 30.68 kg/m²   Wt Readings from Last 3 Encounters:   10/13/20 220 lb (99.8 kg)   08/28/20 219 lb 9.6 oz (99.6 kg)   04/10/20 219 lb (99.3 kg)     Body mass index is 30.68 kg/m². GENERAL - Alert, oriented, pleasant, in no apparent distress. EYES: No jaundice, no conjunctival pallor. SKIN: It is warm & dry. No rashes. No Echhymosis    HEENT - No clinically significant abnormalities seen. Neck - Supple. No jugular venous distention noted. No carotid bruits. Cardiovascular - Normal S1 and S2 without obvious murmur or gallop. Extremities - No cyanosis, clubbing, or significant edema. Pulmonary - No respiratory distress. No wheezes or rales. Abdomen - No masses, tenderness, or organomegaly. Musculoskeletal - No significant edema. No joint deformities. No muscle wasting. Neurologic - Cranial nerves II through XII are grossly intact. There were no gross focal neurologic abnormalities.     Lab Review   No results found for: CKTOTAL, CKMB, CKMBINDEX, TROPONINT  BNP:    Lab Results   Component Value Date    BNP 22 05/02/2014     PT/INR:    Lab Results   Component Value Date    INR 0.97 03/18/2019     Lab Results   Component Value Date    LABA1C 6.3 08/28/2020    LABA1C 6.0 09/04/2019     Lab Results   Component Value Date    WBC 7.1 08/28/2020    HCT 47.2 08/28/2020    MCV 98.0 08/28/2020     08/28/2020     Lab Results   Component Value Date    CHOL 176 08/28/2020    TRIG 97 08/28/2020    HDL 55 08/28/2020    LDLCALC 102 (H) 08/28/2020    LDLDIRECT 155 (H) 05/03/2014     Lab Results   Component Value Date    ALT 18 08/28/2020    AST 22 08/28/2020     BMP:    Lab Results   Component Value Date     08/28/2020    K 5.0 08/28/2020     08/28/2020    CO2 26 08/28/2020    BUN 19 08/28/2020    CREATININE 1.0 08/28/2020     CMP:   Lab Results   Component Value Date     08/28/2020    K 5.0 08/28/2020     08/28/2020    CO2 26 08/28/2020    BUN 19 08/28/2020    PROT 7.1 08/28/2020    PROT 7.1 01/25/2011     TSH:    Lab Results   Component Value Date    TSHHS 0.883 06/01/2013           Impression:    No diagnosis found. Patient Active Problem List   Diagnosis Code    Hypertension I10    Tobacco abuse Z72.0    NSTEMI (non-ST elevated myocardial infarction) ([facility] [address] I21.4    S/P CABG x 4 Z95.1    History of percutaneous left heart catheterization Z98.890    H/O cardiovascular stress test Z92.89    Atherosclerotic heart disease of native coronary artery with other forms of angina pectoris ([facility] I25.118    COPD (chronic obstructive pulmonary disease) ([facility] J44.9    Lumbosacral spondylosis M47.817    Primary erectile dysfunction N52.9    Fatigue R53.83    Shortness of breath R06.02    GERD (gastroesophageal reflux disease) K21.9    Hyperglycemia R73.9       Assessment & Plan:       - PVD   Had PTA   Of LSFA and Rt FP bypass , per art duplex per CTS      -     CORONARY ARTERY DISEASE:  asymptomatic     All available  tests in chart reviewed. Management discussed .   Testing ordered no                                 -  Hypertension: Patients blood pressure is normal. Patient is advised about low sodium diet. Present medical regimen will not be changed. -  LIPID MANAGEMENT:  Available lipid  lab data reviewed  and patient was given dietary advice. NCEP- ATP III guidelines reviewed with patient. -   Changes  in medicines made:  No                                        Farrah Gonzalez MA  [facility]

## 2020-11-12 RX ORDER — CARVEDILOL 12.5 MG/1
TABLET ORAL
Qty: 180 TABLET | Refills: 1 | Status: SHIPPED | OUTPATIENT
Start: 2020-11-12 | End: 2021-03-01 | Stop reason: SDUPTHER

## 2021-01-12 RX ORDER — EVOLOCUMAB 140 MG/ML
140 INJECTION, SOLUTION SUBCUTANEOUS
Qty: 2 SYRINGE | Refills: 5 | Status: SHIPPED | OUTPATIENT
Start: 2021-01-12 | End: 2021-07-14 | Stop reason: SDUPTHER

## 2021-03-01 ENCOUNTER — OFFICE VISIT (OUTPATIENT)
Dept: FAMILY MEDICINE CLINIC | Age: 71
End: 2021-03-01
Payer: MEDICARE

## 2021-03-01 VITALS
TEMPERATURE: 97.5 F | BODY MASS INDEX: 31.64 KG/M2 | HEIGHT: 70 IN | HEART RATE: 76 BPM | WEIGHT: 221 LBS | SYSTOLIC BLOOD PRESSURE: 124 MMHG | DIASTOLIC BLOOD PRESSURE: 90 MMHG

## 2021-03-01 DIAGNOSIS — J43.9 PULMONARY EMPHYSEMA, UNSPECIFIED EMPHYSEMA TYPE (HCC): Primary | ICD-10-CM

## 2021-03-01 DIAGNOSIS — I25.118 ATHEROSCLEROTIC HEART DISEASE OF NATIVE CORONARY ARTERY WITH OTHER FORMS OF ANGINA PECTORIS (HCC): ICD-10-CM

## 2021-03-01 DIAGNOSIS — Z95.1 S/P CABG X 4: ICD-10-CM

## 2021-03-01 DIAGNOSIS — R73.9 HYPERGLYCEMIA: ICD-10-CM

## 2021-03-01 DIAGNOSIS — I10 ESSENTIAL HYPERTENSION: ICD-10-CM

## 2021-03-01 DIAGNOSIS — M47.817 SPONDYLOSIS OF LUMBOSACRAL REGION, UNSPECIFIED SPINAL OSTEOARTHRITIS COMPLICATION STATUS: ICD-10-CM

## 2021-03-01 DIAGNOSIS — K21.9 GASTROESOPHAGEAL REFLUX DISEASE WITHOUT ESOPHAGITIS: ICD-10-CM

## 2021-03-01 LAB
A/G RATIO: 1.4 (ref 1.1–2.2)
ALBUMIN SERPL-MCNC: 4.3 G/DL (ref 3.4–5)
ALP BLD-CCNC: 75 U/L (ref 40–129)
ALT SERPL-CCNC: 12 U/L (ref 10–40)
ANION GAP SERPL CALCULATED.3IONS-SCNC: 10 MMOL/L (ref 3–16)
AST SERPL-CCNC: 17 U/L (ref 15–37)
BILIRUB SERPL-MCNC: 0.6 MG/DL (ref 0–1)
BUN BLDV-MCNC: 20 MG/DL (ref 7–20)
CALCIUM SERPL-MCNC: 10 MG/DL (ref 8.3–10.6)
CHLORIDE BLD-SCNC: 101 MMOL/L (ref 99–110)
CHOLESTEROL, TOTAL: 173 MG/DL (ref 0–199)
CO2: 28 MMOL/L (ref 21–32)
CREAT SERPL-MCNC: 1 MG/DL (ref 0.8–1.3)
GFR AFRICAN AMERICAN: >60
GFR NON-AFRICAN AMERICAN: >60
GLOBULIN: 3.1 G/DL
GLUCOSE BLD-MCNC: 103 MG/DL (ref 70–99)
HCT VFR BLD CALC: 45.9 % (ref 40.5–52.5)
HDLC SERPL-MCNC: 43 MG/DL (ref 40–60)
HEMOGLOBIN: 15.4 G/DL (ref 13.5–17.5)
LDL CHOLESTEROL CALCULATED: 105 MG/DL
MCH RBC QN AUTO: 32.4 PG (ref 26–34)
MCHC RBC AUTO-ENTMCNC: 33.5 G/DL (ref 31–36)
MCV RBC AUTO: 96.7 FL (ref 80–100)
PDW BLD-RTO: 14 % (ref 12.4–15.4)
PLATELET # BLD: 248 K/UL (ref 135–450)
PMV BLD AUTO: 7.3 FL (ref 5–10.5)
POTASSIUM SERPL-SCNC: 5.5 MMOL/L (ref 3.5–5.1)
RBC # BLD: 4.74 M/UL (ref 4.2–5.9)
SODIUM BLD-SCNC: 139 MMOL/L (ref 136–145)
TOTAL PROTEIN: 7.4 G/DL (ref 6.4–8.2)
TRIGL SERPL-MCNC: 126 MG/DL (ref 0–150)
VLDLC SERPL CALC-MCNC: 25 MG/DL
WBC # BLD: 7.6 K/UL (ref 4–11)

## 2021-03-01 PROCEDURE — 3017F COLORECTAL CA SCREEN DOC REV: CPT | Performed by: FAMILY MEDICINE

## 2021-03-01 PROCEDURE — G8417 CALC BMI ABV UP PARAM F/U: HCPCS | Performed by: FAMILY MEDICINE

## 2021-03-01 PROCEDURE — G8427 DOCREV CUR MEDS BY ELIG CLIN: HCPCS | Performed by: FAMILY MEDICINE

## 2021-03-01 PROCEDURE — 1036F TOBACCO NON-USER: CPT | Performed by: FAMILY MEDICINE

## 2021-03-01 PROCEDURE — 99214 OFFICE O/P EST MOD 30 MIN: CPT | Performed by: FAMILY MEDICINE

## 2021-03-01 PROCEDURE — 4040F PNEUMOC VAC/ADMIN/RCVD: CPT | Performed by: FAMILY MEDICINE

## 2021-03-01 PROCEDURE — 3023F SPIROM DOC REV: CPT | Performed by: FAMILY MEDICINE

## 2021-03-01 PROCEDURE — 1123F ACP DISCUSS/DSCN MKR DOCD: CPT | Performed by: FAMILY MEDICINE

## 2021-03-01 PROCEDURE — G8484 FLU IMMUNIZE NO ADMIN: HCPCS | Performed by: FAMILY MEDICINE

## 2021-03-01 PROCEDURE — G8926 SPIRO NO PERF OR DOC: HCPCS | Performed by: FAMILY MEDICINE

## 2021-03-01 RX ORDER — LISINOPRIL 20 MG/1
TABLET ORAL
Qty: 90 TABLET | Refills: 1 | Status: SHIPPED | OUTPATIENT
Start: 2021-03-01 | End: 2021-09-01 | Stop reason: SDUPTHER

## 2021-03-01 RX ORDER — CLOPIDOGREL BISULFATE 75 MG/1
TABLET ORAL
Qty: 45 TABLET | Refills: 1 | Status: SHIPPED | OUTPATIENT
Start: 2021-03-01 | End: 2021-09-01 | Stop reason: SDUPTHER

## 2021-03-01 RX ORDER — PANTOPRAZOLE SODIUM 20 MG/1
TABLET, DELAYED RELEASE ORAL
Qty: 90 TABLET | Refills: 1 | Status: SHIPPED | OUTPATIENT
Start: 2021-03-01 | End: 2021-07-06

## 2021-03-01 RX ORDER — TIOTROPIUM BROMIDE AND OLODATEROL 3.124; 2.736 UG/1; UG/1
2 SPRAY, METERED RESPIRATORY (INHALATION) DAILY
Qty: 1 INHALER | Refills: 5 | Status: SHIPPED | OUTPATIENT
Start: 2021-03-01 | End: 2021-09-01 | Stop reason: SDUPTHER

## 2021-03-01 RX ORDER — CARVEDILOL 12.5 MG/1
TABLET ORAL
Qty: 180 TABLET | Refills: 1 | Status: SHIPPED | OUTPATIENT
Start: 2021-03-01 | End: 2021-09-01 | Stop reason: SDUPTHER

## 2021-03-01 RX ORDER — SILDENAFIL 100 MG/1
100 TABLET, FILM COATED ORAL PRN
Qty: 30 TABLET | Refills: 1 | Status: SHIPPED | OUTPATIENT
Start: 2021-03-01 | End: 2022-08-22 | Stop reason: SDUPTHER

## 2021-03-01 RX ORDER — AMLODIPINE BESYLATE 5 MG/1
TABLET ORAL
Qty: 90 TABLET | Refills: 1 | Status: SHIPPED | OUTPATIENT
Start: 2021-03-01 | End: 2021-09-01 | Stop reason: SDUPTHER

## 2021-03-01 ASSESSMENT — ENCOUNTER SYMPTOMS
EYE PAIN: 0
BLOOD IN STOOL: 0
CHEST TIGHTNESS: 0
DIARRHEA: 0
BACK PAIN: 1
NAUSEA: 0
WHEEZING: 0
SHORTNESS OF BREATH: 0
COUGH: 1
ABDOMINAL PAIN: 0
TROUBLE SWALLOWING: 0
VOMITING: 0

## 2021-03-01 ASSESSMENT — PATIENT HEALTH QUESTIONNAIRE - PHQ9
SUM OF ALL RESPONSES TO PHQ QUESTIONS 1-9: 0
1. LITTLE INTEREST OR PLEASURE IN DOING THINGS: 0

## 2021-03-01 NOTE — PROGRESS NOTES
3/1/2021    [facility]     Chief Complaint   Patient presents with    6 Month Follow-Up     pt reports no concerns       HPI  History was obtained from the patient. Casey Mcdowell is a 66 y.o. male who presents today with follow-up on hyperlipidemia, hyperglycemia, GERD, osteoarthritis , coronary artery disease,and COPD    He is asked been doing well and trying to socially isolate but yet stay very physically active. Breathing has not been an issue. GERD symptoms have not flared Meds are currently well-tolerated he denies anginal type chest pain. He does follow with cardiology regularly. Pressure is acceptable today on review he does need labs and refills. He has had his Covid shots x2. When weather allows he will begin increasing his physical activity. REVIEW OF SYMPTOMS    Review of Systems   Constitutional: Negative for activity change and fatigue. HENT: Negative for congestion, hearing loss, mouth sores and trouble swallowing. Eyes: Negative for pain and visual disturbance. Respiratory: Positive for cough. Negative for chest tightness, shortness of breath and wheezing. Patient with history of COPD   Cardiovascular: Negative for chest pain and palpitations. Patient with history of coronary artery disease and bypass grafting- follows with cardiology   Gastrointestinal: Negative for abdominal pain, blood in stool, diarrhea, nausea and vomiting. Endocrine: Negative. Genitourinary: Negative for dysuria, frequency and urgency. Musculoskeletal: Positive for arthralgias and back pain. Negative for gait problem and neck stiffness. Skin: Negative for rash. Allergic/Immunologic: Negative for environmental allergies. Neurological: Negative for dizziness, seizures, speech difficulty and weakness. Hematological: Does not bruise/bleed easily. Psychiatric/Behavioral: Negative for agitation, confusion, hallucinations and suicidal ideas. The patient is not nervous/anxious. PAST MEDICAL HISTORY  Past Medical History:   Diagnosis Date    Acid reflux     Basal Cell Skin Cancer Excised Nose 1992    Basal Cell Skin Cancer Excised Nose    CAD (coronary artery disease)     Sees Dr. Gutierrez Talley COPD (chronic obstructive pulmonary disease) ([facility] [address]     Essential hypertension     H/O cardiovascular stress test 5/29/2014    cardiolite-normal, no ischemia, EF 60%    H/O cardiovascular stress test 1/18/2015    cardiolite-normal, EF51%    H/O cardiovascular stress test 02/12/2018    EF60% normal study    H/O Doppler lower arterial ultrasound 04/22/2019    No significant occlusive arterial disease, Patent right femoral popliteal bypass graft with biphasic waveforms, bilateral abis show mild peripheral arterial disease    H/O Doppler ultrasound 6/1/2016    Lower Ext Arterial- normal study, mild stenosis in RLE    H/O echocardiogram 5/28/2014    EF35-45% mild MR, mild AV calcification    H/O echocardiogram 09/25/2019    EF 22-55%, grade I diastolic dysfunction, No significant valvular disease, no pericardial effusion     Heart attack ([facility] [address] 2014    History of cardiac cath 02/12/2019    PATENT RENAL ARTERIES DISTAL ABD AORTA ATHEROSCLEROTIC, ANEYRYSMAL, RCIA PARENT, REIA  PATENT, RT SFA OCCLUDED, DISTALLY RECONSTITUTES, 3 V RUNOFF,  LT MARIA ISABEL PATENT, CHRIS 70% TO 0%,  LT SFA 80% TO 0% WITH ATHRECTOMY TO PTA, LT POP PATENT,  3 V run off                        History of nuclear stress test 09/25/2019    EF 51%, Normal    History of percutaneous left heart catheterization 5/3/2014    RCA 99% distal stenosis EF55%    History of Peripheral Angiogram 03/07/2019    RCIA PATENT, REIA CALCIFIED 70% STENOSIS, RSFA OCCLUDED NO STUMP, PFA  PATENT FILL DISTAL SFA WITH COLLATERLS, RT POP PATENT, 3 V RUN OFF    Hyperlipidemia     Lumbosacral spondylosis     Primary erectile dysfunction     S/P CABG x 4 2014    vein graft diagonal coronary artery,vein graft distal RCA,vein graft obtuse marginal coronary artery and left internal mammary artery to the left anterior descending    Shortness of breath on exertion     Teeth missing     Upper And Lower    Wears dentures     Full Upper    Wears glasses        FAMILY HISTORY  Family History   Adopted: Yes   Problem Relation Age of Onset    Heart Disease Mother     Hypertension Mother     Diabetes Mother        SOCIAL HISTORY  Social History     Socioeconomic History    Marital status:      Spouse name: None    Number of children: None    Years of education: None    Highest education level: None   Occupational History    None   Social Needs    Financial resource strain: None    Food insecurity     Worry: None     Inability: None    Transportation needs     Medical: None     Non-medical: None   Tobacco Use    Smoking status: Former Smoker     Packs/day: 1.00     Years: 55.00     Pack years: 55.00     Types: Cigarettes, Pipe, Cigars     Start date:      Quit date:      Years since quittin.1    Smokeless tobacco: Former User     Types: Chew     Quit date:    Substance and Sexual Activity    Alcohol use: No    Drug use: No    Sexual activity: Not Currently   Lifestyle    Physical activity     Days per week: None     Minutes per session: None    Stress: None   Relationships    Social connections     Talks on phone: None     Gets together: None     Attends Buddhism service: None     Active member of club or organization: None     Attends meetings of clubs or organizations: None     Relationship status: None    Intimate partner violence     Fear of current or ex partner: None     Emotionally abused: None     Physically abused: None     Forced sexual activity: None   Other Topics Concern    None   Social History Narrative    None        SURGICAL HISTORY  Past Surgical History:   Procedure Laterality Date    CARDIAC SURGERY      CABG (4 Bypasses)    COLONOSCOPY  Last Done In 2019    Polyps Removed In Past    DENTAL SURGERY      Teeth Extracted In Past    FEMORAL BYPASS Right 3/25/2019    RIGHT FEMORAL POPLITEAL BYPASS performed by Chi Jo MD [address] Left 02/12/2019    \"Stent\"    SKIN CANCER EXCISION  1992    Basal Cell Skin Cancer Excised Nose    TONSILLECTOMY AND ADENOIDECTOMY  1950's                 CURRENT MEDICATIONS  Current Outpatient Medications   Medication Sig Dispense Refill    carvedilol (COREG) 12.5 MG tablet TAKE 1 TABLET BY MOUTH TWICE DAILY WITH MEALS 180 tablet 1    lisinopril (PRINIVIL;ZESTRIL) 20 MG tablet Take 1 tablet by mouth once daily 90 tablet 1    pantoprazole (PROTONIX) 20 MG tablet TAKE 1 TABLET BY MOUTH ONCE DAILY IN THE MORNING BEFORE BREAKFAST 90 tablet 1    amLODIPine (NORVASC) 5 MG tablet TAKE 1 TABLET BY MOUTH ONCE DAILY IN THE MORNING 90 tablet 1    clopidogrel (PLAVIX) 75 MG tablet Take 1/2 (one-half) tablet by mouth once daily 45 tablet 1    sildenafil (VIAGRA) 100 MG tablet Take 1 tablet by mouth as needed for Erectile Dysfunction 30 tablet 1    Tiotropium Bromide-Olodaterol (STIOLTO RESPIMAT) 2.5-2.5 MCG/ACT AERS Inhale 2 actuation into the lungs daily 1 Inhaler 5    Evolocumab (REPATHA) 140 MG/ML SOSY Inject 1 mL into the skin every 14 days 2 Syringe 5    albuterol sulfate  (90 Base) MCG/ACT inhaler Inhale 2 puffs into the lungs 4 times daily as needed for Wheezing 1 Inhaler 2    aspirin 81 MG tablet Take 81 mg by mouth nightly Over The Counter       No current facility-administered medications for this visit. ALLERGIES  Allergies   Allergen Reactions    Statins      \"Paralyzes Muscles And Causes Muscle Weakness\"    Lipitor [Atorvastatin Calcium]      Muscle weakness    Spiriva Handihaler [Tiotropium Bromide Monohydrate]      Hoarse voice       PHYSICAL EXAM    BP (!) 124/90   Pulse 76   Temp 97.5 °F (36.4 °C)   Ht 5' 10\" (1.778 m)   Wt 221 lb (100.2 kg)   BMI 31.71 kg/m²     Physical Exam  Vitals signs and nursing note reviewed. Constitutional:       General: He is not in acute distress. Appearance: Normal appearance. He is well-developed. He is not ill-appearing. HENT:      Head: Normocephalic and atraumatic. Nose: Nose normal.      Mouth/Throat:      Mouth: Mucous membranes are moist.      Pharynx: Oropharynx is clear. Eyes:      Pupils: Pupils are equal, round, and reactive to light. Neck:      Musculoskeletal: Normal range of motion and neck supple. Cardiovascular:      Rate and Rhythm: Normal rate and regular rhythm. Pulses: Normal pulses. Heart sounds: Normal heart sounds. No murmur. Pulmonary:      Effort: Pulmonary effort is normal. No respiratory distress. Breath sounds: Normal breath sounds. No wheezing, rhonchi or rales. Abdominal:      Palpations: Abdomen is soft. Musculoskeletal: Normal range of motion. General: No deformity. Right lower leg: No edema. Left lower leg: No edema. Skin:     General: Skin is warm and dry. Neurological:      General: No focal deficit present. Mental Status: He is alert and oriented to person, place, and time. Mental status is at baseline. Cranial Nerves: No cranial nerve deficit. Motor: No weakness. Gait: Gait normal.   Psychiatric:         Behavior: Behavior normal.         Thought Content: Thought content normal.         ASSESSMENT & PLAN     Diagnosis Orders   1. Pulmonary emphysema, unspecified emphysema type ([address]     2. Atherosclerotic heart disease of native coronary artery with other forms of angina pectoris ([address]  LIPID PANEL   3. Essential hypertension  carvedilol (COREG) 12.5 MG tablet    lisinopril (PRINIVIL;ZESTRIL) 20 MG tablet    amLODIPine (NORVASC) 5 MG tablet    CBC    COMPREHENSIVE METABOLIC PANEL   4. Hyperglycemia  HEMOGLOBIN A1C   5. S/P CABG x 4  clopidogrel (PLAVIX) 75 MG tablet   6. Gastroesophageal reflux disease without esophagitis  pantoprazole (PROTONIX) 20 MG tablet   7.  Spondylosis of lumbosacral region, unspecified spinal osteoarthritis complication status     Will check CMP, lipid panel,CBC, and an A1c and have him follow-up for results. He is to continue with healthy lifestyle- work on exercise and social distancing. Refills will be provided. Call with questions or problems. Return in about 6 months (around 9/1/2021).          Electronically signed by Evelia Hardwick MD on 3/1/2021

## 2021-03-02 LAB
ESTIMATED AVERAGE GLUCOSE: 122.6 MG/DL
HBA1C MFR BLD: 5.9 %

## 2021-04-30 ENCOUNTER — OFFICE VISIT (OUTPATIENT)
Dept: CARDIOLOGY CLINIC | Age: 71
End: 2021-04-30
Payer: MEDICARE

## 2021-04-30 VITALS
BODY MASS INDEX: 32.23 KG/M2 | WEIGHT: 224.6 LBS | SYSTOLIC BLOOD PRESSURE: 134 MMHG | DIASTOLIC BLOOD PRESSURE: 88 MMHG | OXYGEN SATURATION: 96 % | HEART RATE: 76 BPM

## 2021-04-30 DIAGNOSIS — I10 ESSENTIAL HYPERTENSION: ICD-10-CM

## 2021-04-30 DIAGNOSIS — E78.5 HYPERLIPIDEMIA, UNSPECIFIED HYPERLIPIDEMIA TYPE: ICD-10-CM

## 2021-04-30 DIAGNOSIS — I25.118 ATHEROSCLEROTIC HEART DISEASE OF NATIVE CORONARY ARTERY WITH OTHER FORMS OF ANGINA PECTORIS (HCC): Primary | ICD-10-CM

## 2021-04-30 PROCEDURE — 1036F TOBACCO NON-USER: CPT | Performed by: NURSE PRACTITIONER

## 2021-04-30 PROCEDURE — 99214 OFFICE O/P EST MOD 30 MIN: CPT | Performed by: NURSE PRACTITIONER

## 2021-04-30 PROCEDURE — G8427 DOCREV CUR MEDS BY ELIG CLIN: HCPCS | Performed by: NURSE PRACTITIONER

## 2021-04-30 PROCEDURE — 1123F ACP DISCUSS/DSCN MKR DOCD: CPT | Performed by: NURSE PRACTITIONER

## 2021-04-30 PROCEDURE — 4040F PNEUMOC VAC/ADMIN/RCVD: CPT | Performed by: NURSE PRACTITIONER

## 2021-04-30 PROCEDURE — G8417 CALC BMI ABV UP PARAM F/U: HCPCS | Performed by: NURSE PRACTITIONER

## 2021-04-30 PROCEDURE — 3017F COLORECTAL CA SCREEN DOC REV: CPT | Performed by: NURSE PRACTITIONER

## 2021-04-30 ASSESSMENT — ENCOUNTER SYMPTOMS
SHORTNESS OF BREATH: 0
ORTHOPNEA: 0

## 2021-04-30 NOTE — PROGRESS NOTES
4/30/2021  Primary cardiologist: Dr. Anisa Hanson  is an established 66 y.o.  male here for follow-up on   1. Atherosclerotic heart disease of native coronary artery with other forms of angina pectoris ([facility] [address]    2. Essential hypertension    3. Hyperlipidemia, unspecified hyperlipidemia type    4. PVD        SUBJECTIVE/OBJECTIVE:  HPI :     Patsy Sanders reports he is feeling well. There is no chest pain or shortness of breath. Denies palpitations, lightheadedness or dizziness. He is compliant with his medication. Denies any claudication symptoms    Review of Systems   Constitution: Negative for diaphoresis and malaise/fatigue. Cardiovascular: Negative for chest pain, claudication, dyspnea on exertion, irregular heartbeat, leg swelling, near-syncope, orthopnea, palpitations and paroxysmal nocturnal dyspnea. Respiratory: Negative for shortness of breath. Neurological: Negative for dizziness and light-headedness. Vitals:    04/30/21 0951   BP: 134/88   Site: Left Upper Arm   Position: Sitting   Cuff Size: Medium Adult   Pulse: 76   SpO2: 96%   Weight: 224 lb 9.6 oz (101.9 kg)     No flowsheet data found. Wt Readings from Last 3 Encounters:   04/30/21 224 lb 9.6 oz (101.9 kg)   03/01/21 221 lb (100.2 kg)   10/13/20 220 lb (99.8 kg)     Body mass index is 32.23 kg/m². Physical Exam :     Neck: supple,  no carotid bruit appreciated, JVD not appreciated    Respiratory:  Normal breath sounds, No respiratory distress, No wheezing    Cardiovascular:  S1 S2 appreciated, Regular rate, regular rhythm,  no murmur appreciated, No rubs appreciated, No gallops appreciated, No chest tenderness.      Extremities:  no edema to the lower extremities    All pertinent data reviewed and discussed with patient including:        Peripheral angiogram 2019    RCIA PATENT   REIA CALCIFIED , 70% STENOSIS   RSFA OCCLUDED NO STUMP   PfA APTENT FILL DISTAL SFA WITH COLLATERLS   RT POP PATENT   3 V RUN OFF    ASSESSMENT/PLAN: ASCVD  H/o of 2014 CABG x  4  SVG  to the diagonal coronary  artery, vein graft to the distal right coronary artery, vein graft to the  obtuse marginal coronary artery and left internal mammary artery to the left  anterior descending ( Dr Ruchi Fry)  Patient is not having cardiac symptoms   He is to continue with current medications: Plavix, carvedilol, aspirin  Recommend to continue with aggressive risk factor modification including diet, good blood pressure control, and lipid management    PVD  H/o  right Femoral-Popliteal Bypass -2019   Denies claudication symptoms. Encouraged to add a walking program    Hypertension   Blood pressure is Controlled  He is to continue current medications amlodipine l/isinopril  Encouraged a low sodium diet      Dyslipidemia   Lipids reviewed   Results for Jessica Elise (MRN [id number]) as of 4/30/2021 08:06   Ref. Range 3/1/2021 08:59   Cholesterol, Total Latest Ref Range: 0 - 199 mg/dL 173   HDL Cholesterol Latest Ref Range: 40 - 60 mg/dL 43   LDL Calculated Latest Ref Range: <100 mg/dL 105 (H)   Triglycerides Latest Ref Range: 0 - 150 mg/dL 126   VLDL Cholesterol Calculated Latest Ref Range: Not Established mg/dL 25     Consider not at goal.  LDL continues to be elevated. He has allergies to statins and currently is on Repatha. Recommend he continue with Repatha have advised patient to add organized exercise, weight loss and stricter diet. Recommend to recheck cholesterol in 3 to 6 months and if LDL has not improved we will add Zetia      Medications reviewed and confirmed with patient     Tests ordered:  none    OV in 6 months     Signed:  Sukumar Raman, 4/30/2021, 9:56 AM    An electronic signature was used to authenticate this note.

## 2021-07-06 ENCOUNTER — OFFICE VISIT (OUTPATIENT)
Dept: FAMILY MEDICINE CLINIC | Age: 71
End: 2021-07-06
Payer: MEDICARE

## 2021-07-06 ENCOUNTER — TELEPHONE (OUTPATIENT)
Dept: FAMILY MEDICINE CLINIC | Age: 71
End: 2021-07-06

## 2021-07-06 VITALS
WEIGHT: 223.4 LBS | OXYGEN SATURATION: 95 % | HEIGHT: 70 IN | HEART RATE: 81 BPM | BODY MASS INDEX: 31.98 KG/M2 | SYSTOLIC BLOOD PRESSURE: 136 MMHG | DIASTOLIC BLOOD PRESSURE: 84 MMHG

## 2021-07-06 DIAGNOSIS — K21.9 GASTROESOPHAGEAL REFLUX DISEASE, UNSPECIFIED WHETHER ESOPHAGITIS PRESENT: ICD-10-CM

## 2021-07-06 DIAGNOSIS — R73.9 HYPERGLYCEMIA: ICD-10-CM

## 2021-07-06 DIAGNOSIS — I10 ESSENTIAL HYPERTENSION: ICD-10-CM

## 2021-07-06 DIAGNOSIS — J43.9 PULMONARY EMPHYSEMA, UNSPECIFIED EMPHYSEMA TYPE (HCC): Primary | ICD-10-CM

## 2021-07-06 DIAGNOSIS — E78.5 HYPERLIPIDEMIA, UNSPECIFIED HYPERLIPIDEMIA TYPE: ICD-10-CM

## 2021-07-06 PROCEDURE — G8926 SPIRO NO PERF OR DOC: HCPCS | Performed by: FAMILY MEDICINE

## 2021-07-06 PROCEDURE — 3023F SPIROM DOC REV: CPT | Performed by: FAMILY MEDICINE

## 2021-07-06 PROCEDURE — G8417 CALC BMI ABV UP PARAM F/U: HCPCS | Performed by: FAMILY MEDICINE

## 2021-07-06 PROCEDURE — 99213 OFFICE O/P EST LOW 20 MIN: CPT | Performed by: FAMILY MEDICINE

## 2021-07-06 PROCEDURE — 3017F COLORECTAL CA SCREEN DOC REV: CPT | Performed by: FAMILY MEDICINE

## 2021-07-06 PROCEDURE — 4040F PNEUMOC VAC/ADMIN/RCVD: CPT | Performed by: FAMILY MEDICINE

## 2021-07-06 PROCEDURE — 1123F ACP DISCUSS/DSCN MKR DOCD: CPT | Performed by: FAMILY MEDICINE

## 2021-07-06 PROCEDURE — G8427 DOCREV CUR MEDS BY ELIG CLIN: HCPCS | Performed by: FAMILY MEDICINE

## 2021-07-06 PROCEDURE — 1036F TOBACCO NON-USER: CPT | Performed by: FAMILY MEDICINE

## 2021-07-06 RX ORDER — LANSOPRAZOLE 30 MG/1
30 CAPSULE, DELAYED RELEASE ORAL DAILY
Qty: 30 CAPSULE | Refills: 5 | Status: SHIPPED | OUTPATIENT
Start: 2021-07-06 | End: 2022-02-15

## 2021-07-07 ASSESSMENT — ENCOUNTER SYMPTOMS
TROUBLE SWALLOWING: 0
DIARRHEA: 0
BLOOD IN STOOL: 0
CHEST TIGHTNESS: 0
SHORTNESS OF BREATH: 0
VOMITING: 0
NAUSEA: 0
EYE PAIN: 0
WHEEZING: 0
ABDOMINAL PAIN: 0

## 2021-07-07 NOTE — PROGRESS NOTES
7/7/2021    [facility]    Chief Complaint   Patient presents with    Other     GERD About a month or two. Two to three time a week. HPI  History was obtained from the patient. Lois Ortega is a 66 y.o. male who presents today with follow-up on hypertension, coronary disease, GERD, COPD, and hyperglycemia. Lois Ortega states he is axman feeling well no chest pain no shortness of breath occasionally has a flare of GERD symptoms. Has been on same PPI for quite some time. Shortness of breath comes and goes in terms of outside exposure and exertion but overall has been stable. His last labs were in March of this year including an A1c of 5.9%. Mood remains positive does try to stay as active as reasonable. His shots are up-to-date and his cardiology evaluations are also current. There has been no recent change in cardiac medications. REVIEW OF SYMPTOMS    Review of Systems   Constitutional: Negative for activity change and fatigue. HENT: Negative for congestion, hearing loss, mouth sores and trouble swallowing. Eyes: Negative for pain and visual disturbance. Respiratory: Negative for chest tightness, shortness of breath and wheezing. Cardiovascular: Negative for chest pain and palpitations. Gastrointestinal: Negative for abdominal pain, blood in stool, diarrhea, nausea and vomiting. Patient is with some increased GERD symptoms recently despite current Rx for same. Endocrine: Negative. Negative for cold intolerance, polydipsia and polyuria. Genitourinary: Negative for dysuria, frequency and urgency. Musculoskeletal: Negative for arthralgias, gait problem and neck stiffness. Skin: Negative for rash. Allergic/Immunologic: Negative for environmental allergies. Neurological: Negative for dizziness, seizures, speech difficulty and weakness. Hematological: Does not bruise/bleed easily.    Psychiatric/Behavioral: Negative for agitation, confusion, hallucinations and suicidal ideas. The patient is not nervous/anxious.         PAST MEDICAL HISTORY  Past Medical History:   Diagnosis Date    Acid reflux     Basal Cell Skin Cancer Excised Nose 1992    Basal Cell Skin Cancer Excised Nose    CAD (coronary artery disease)     Sees Dr. Geoffrey Justice COPD (chronic obstructive pulmonary disease) ([facility] [address]     Essential hypertension     H/O cardiovascular stress test 5/29/2014    cardiolite-normal, no ischemia, EF 60%    H/O cardiovascular stress test 1/18/2015    cardiolite-normal, EF51%    H/O cardiovascular stress test 02/12/2018    EF60% normal study    H/O Doppler lower arterial ultrasound 04/22/2019    No significant occlusive arterial disease, Patent right femoral popliteal bypass graft with biphasic waveforms, bilateral abis show mild peripheral arterial disease    H/O Doppler ultrasound 6/1/2016    Lower Ext Arterial- normal study, mild stenosis in RLE    H/O echocardiogram 5/28/2014    EF35-45% mild MR, mild AV calcification    H/O echocardiogram 09/25/2019    EF 82-64%, grade I diastolic dysfunction, No significant valvular disease, no pericardial effusion     Heart attack ([facility] [address] 2014    History of cardiac cath 02/12/2019    PATENT RENAL ARTERIES DISTAL ABD AORTA ATHEROSCLEROTIC, ANEYRYSMAL, RCIA PARENT, REIA  PATENT, RT SFA OCCLUDED, DISTALLY RECONSTITUTES, 3 V RUNOFF,  LT MARIA ISABEL PATENT, CHRIS 70% TO 0%,  LT SFA 80% TO 0% WITH ATHRECTOMY TO PTA, LT POP PATENT,  3 V run off                        History of nuclear stress test 09/25/2019    EF 51%, Normal    History of percutaneous left heart catheterization 5/3/2014    RCA 99% distal stenosis EF55%    History of Peripheral Angiogram 03/07/2019    RCIA PATENT, REIA CALCIFIED 70% STENOSIS, RSFA OCCLUDED NO STUMP, PFA  PATENT FILL DISTAL SFA WITH COLLATERLS, RT POP PATENT, 3 V RUN OFF    Hyperlipidemia     Lumbosacral spondylosis     Primary erectile dysfunction     S/P CABG x 4 2014    vein graft diagonal coronary artery,vein graft distal RCA,vein graft obtuse marginal coronary artery and left internal mammary artery to the left anterior descending    Shortness of breath on exertion     Teeth missing     Upper And Lower    Wears dentures     Full Upper    Wears glasses        FAMILY HISTORY  Family History   Adopted: Yes   Problem Relation Age of Onset    Heart Disease Mother     Hypertension Mother     Diabetes Mother        SOCIAL HISTORY  Social History     Socioeconomic History    Marital status:      Spouse name: Not on file    Number of children: Not on file    Years of education: Not on file    Highest education level: Not on file   Occupational History    Not on file   Tobacco Use    Smoking status: Former Smoker     Packs/day: 1.00     Years: 55.00     Pack years: 55.00     Types: Cigarettes, Pipe, Cigars     Start date:      Quit date:      Years since quittin.5    Smokeless tobacco: Former User     Types: Chew     Quit date:    Vaping Use    Vaping Use: Never used   Substance and Sexual Activity    Alcohol use: No    Drug use: No    Sexual activity: Not Currently   Other Topics Concern    Not on file   Social History Narrative    Not on file     Social Determinants of Health     Financial Resource Strain:     Difficulty of Paying Living Expenses:    Food Insecurity:     Worried About Running Out of Food in the Last Year:     Ran Out of Food in the Last Year:    Transportation Needs:     Lack of Transportation (Medical):      Lack of Transportation (Non-Medical):    Physical Activity:     Days of Exercise per Week:     Minutes of Exercise per Session:    Stress:     Feeling of Stress :    Social Connections:     Frequency of Communication with Friends and Family:     Frequency of Social Gatherings with Friends and Family:     Attends Denominational Services:     Active Member of Clubs or Organizations:     Attends Club or Organization Meetings:     Marital Status:    Intimate Partner Violence:     Fear of Current or Ex-Partner:     Emotionally Abused:     Physically Abused:     Sexually Abused:         SURGICAL HISTORY  Past Surgical History:   Procedure Laterality Date    CARDIAC SURGERY  2014    CABG (4 Bypasses)    COLONOSCOPY  Last Done In 2019    Polyps Removed In Past    DENTAL SURGERY      Teeth Extracted In Past    FEMORAL BYPASS Right 3/25/2019    RIGHT FEMORAL POPLITEAL BYPASS [facility] [address] [facility]12/[facility]    \"Stent\"    SKIN CANCER EXCISION  1992    Basal Cell Skin Cancer Excised Nose    TONSILLECTOMY AND ADENOIDECTOMY  1950's                 CURRENT MEDICATIONS  Current Outpatient Medications   Medication Sig Dispense Refill    lansoprazole (PREVACID) 30 MG delayed release capsule Take 1 capsule by mouth daily 30 capsule 5    carvedilol (COREG) 12.5 MG tablet TAKE 1 TABLET BY MOUTH TWICE DAILY WITH MEALS 180 tablet 1    lisinopril (PRINIVIL;ZESTRIL) 20 MG tablet Take 1 tablet by mouth once daily 90 tablet 1    amLODIPine (NORVASC) 5 MG tablet TAKE 1 TABLET BY MOUTH ONCE DAILY IN THE MORNING 90 tablet 1    clopidogrel (PLAVIX) 75 MG tablet Take 1/2 (one-half) tablet by mouth once daily 45 tablet 1    sildenafil (VIAGRA) 100 MG tablet Take 1 tablet by mouth as needed for Erectile Dysfunction 30 tablet 1    Tiotropium Bromide-Olodaterol (STIOLTO RESPIMAT) 2.5-2.5 MCG/ACT AERS Inhale 2 actuation into the lungs daily 1 Inhaler 5    Evolocumab (REPATHA) 140 MG/ML SOSY Inject 1 mL into the skin every 14 days 2 Syringe 5    albuterol sulfate  (90 Base) MCG/ACT inhaler Inhale 2 puffs into the lungs 4 times daily as needed for Wheezing 1 Inhaler 2    aspirin 81 MG tablet Take 81 mg by mouth nightly Over The Counter       No current facility-administered medications for this visit.        ALLERGIES  Allergies   Allergen Reactions    Statins      \"Paralyzes Muscles And Causes Muscle Weakness\"    Lipitor [Atorvastatin Calcium] Muscle weakness    Spiriva Handihaler [Tiotropium Bromide Monohydrate]      Hoarse voice       PHYSICAL EXAM    /84   Pulse 81   Ht 5' 10\" (1.778 m)   Wt 223 lb 6.4 oz (101.3 kg)   SpO2 95%   BMI 32.05 kg/m²     Physical Exam  Vitals and nursing note reviewed. Constitutional:       Appearance: He is well-developed. He is not ill-appearing. HENT:      Head: Normocephalic and atraumatic. Nose: Nose normal.      Mouth/Throat:      Mouth: Mucous membranes are moist.   Eyes:      Pupils: Pupils are equal, round, and reactive to light. Cardiovascular:      Rate and Rhythm: Normal rate and regular rhythm. Pulses: Normal pulses. Heart sounds: Normal heart sounds. Pulmonary:      Effort: Pulmonary effort is normal. No respiratory distress. Breath sounds: Normal breath sounds. No wheezing, rhonchi or rales. Abdominal:      Palpations: Abdomen is soft. Musculoskeletal:         General: No swelling or deformity. Normal range of motion. Cervical back: Normal range of motion and neck supple. No rigidity. Skin:     General: Skin is warm and dry. Neurological:      Mental Status: He is alert and oriented to person, place, and time. Cranial Nerves: No cranial nerve deficit. Motor: No weakness. Gait: Gait normal.   Psychiatric:         Mood and Affect: Mood normal.         Behavior: Behavior normal.         Thought Content: Thought content normal.         ASSESSMENT & PLAN     Diagnosis Orders   1. Pulmonary emphysema, unspecified emphysema type ([facility] [address]     2. Gastroesophageal reflux disease, unspecified whether esophagitis present     3. Hyperlipidemia, unspecified hyperlipidemia type     4. Essential hypertension     5. Hyperglycemia     At this point we will have him stop pantoprazole & switch to lansoprazole 30 mg every morning-he is to continue to work on antireflux measures.   Stay on the same overall regimen, continue with activity as possible, keep appoinmentst with subspecialists, and call with questions or problems. May use antacids as needed also for GERD symptoms. Return in about 4 months (around 11/6/2021).          Electronically signed by Yunior Salazar MD on 7/7/2021

## 2021-07-14 DIAGNOSIS — I10 ESSENTIAL HYPERTENSION: ICD-10-CM

## 2021-07-14 DIAGNOSIS — I25.118 ATHEROSCLEROTIC HEART DISEASE OF NATIVE CORONARY ARTERY WITH OTHER FORMS OF ANGINA PECTORIS (HCC): Primary | ICD-10-CM

## 2021-07-14 DIAGNOSIS — Z95.1 S/P CABG X 4: ICD-10-CM

## 2021-07-14 DIAGNOSIS — I73.9 PVD (PERIPHERAL VASCULAR DISEASE) (HCC): ICD-10-CM

## 2021-07-14 RX ORDER — EVOLOCUMAB 140 MG/ML
140 INJECTION, SOLUTION SUBCUTANEOUS
Qty: 2 SYRINGE | Refills: 5 | Status: SHIPPED | OUTPATIENT
Start: 2021-07-14 | End: 2021-09-01 | Stop reason: SDUPTHER

## 2021-07-14 RX ORDER — EVOLOCUMAB 140 MG/ML
INJECTION, SOLUTION SUBCUTANEOUS
Qty: 2 ML | Refills: 0 | OUTPATIENT
Start: 2021-07-14

## 2021-07-15 ENCOUNTER — TELEPHONE (OUTPATIENT)
Dept: FAMILY MEDICINE CLINIC | Age: 71
End: 2021-07-15

## 2021-07-15 ENCOUNTER — OFFICE VISIT (OUTPATIENT)
Dept: FAMILY MEDICINE CLINIC | Age: 71
End: 2021-07-15
Payer: MEDICARE

## 2021-07-15 VITALS
SYSTOLIC BLOOD PRESSURE: 132 MMHG | OXYGEN SATURATION: 94 % | HEART RATE: 65 BPM | WEIGHT: 223.9 LBS | BODY MASS INDEX: 32.05 KG/M2 | DIASTOLIC BLOOD PRESSURE: 82 MMHG | HEIGHT: 70 IN

## 2021-07-15 DIAGNOSIS — Z00.00 ROUTINE GENERAL MEDICAL EXAMINATION AT A HEALTH CARE FACILITY: Primary | ICD-10-CM

## 2021-07-15 DIAGNOSIS — K21.9 GASTROESOPHAGEAL REFLUX DISEASE, UNSPECIFIED WHETHER ESOPHAGITIS PRESENT: Primary | ICD-10-CM

## 2021-07-15 PROCEDURE — 99214 OFFICE O/P EST MOD 30 MIN: CPT | Performed by: PHYSICIAN ASSISTANT

## 2021-07-15 PROCEDURE — G8427 DOCREV CUR MEDS BY ELIG CLIN: HCPCS | Performed by: PHYSICIAN ASSISTANT

## 2021-07-15 PROCEDURE — G0438 PPPS, INITIAL VISIT: HCPCS | Performed by: PHYSICIAN ASSISTANT

## 2021-07-15 PROCEDURE — 1123F ACP DISCUSS/DSCN MKR DOCD: CPT | Performed by: PHYSICIAN ASSISTANT

## 2021-07-15 PROCEDURE — 4040F PNEUMOC VAC/ADMIN/RCVD: CPT | Performed by: PHYSICIAN ASSISTANT

## 2021-07-15 PROCEDURE — 3017F COLORECTAL CA SCREEN DOC REV: CPT | Performed by: PHYSICIAN ASSISTANT

## 2021-07-15 PROCEDURE — 1036F TOBACCO NON-USER: CPT | Performed by: PHYSICIAN ASSISTANT

## 2021-07-15 PROCEDURE — G8417 CALC BMI ABV UP PARAM F/U: HCPCS | Performed by: PHYSICIAN ASSISTANT

## 2021-07-15 RX ORDER — OMEPRAZOLE 20 MG/1
20 CAPSULE, DELAYED RELEASE ORAL DAILY
Qty: 30 CAPSULE | Refills: 2 | Status: SHIPPED | OUTPATIENT
Start: 2021-07-15 | End: 2022-02-15

## 2021-07-15 ASSESSMENT — PATIENT HEALTH QUESTIONNAIRE - PHQ9
SUM OF ALL RESPONSES TO PHQ9 QUESTIONS 1 & 2: 0
2. FEELING DOWN, DEPRESSED OR HOPELESS: 0
SUM OF ALL RESPONSES TO PHQ QUESTIONS 1-9: 0
1. LITTLE INTEREST OR PLEASURE IN DOING THINGS: 0

## 2021-07-15 ASSESSMENT — LIFESTYLE VARIABLES
AUDIT-C TOTAL SCORE: INCOMPLETE
HOW OFTEN DO YOU HAVE A DRINK CONTAINING ALCOHOL: 0
HOW OFTEN DO YOU HAVE A DRINK CONTAINING ALCOHOL: NEVER
AUDIT TOTAL SCORE: INCOMPLETE

## 2021-07-15 NOTE — PATIENT INSTRUCTIONS

## 2021-07-15 NOTE — PROGRESS NOTES
Tobacco Use    Smoking status: Former Smoker     Packs/day: 1.00     Years: 55.00     Pack years: 55.00     Types: Cigarettes, Pipe, Cigars     Start date:      Quit date: 2014     Years since quittin.5    Smokeless tobacco: Former User     Types: [address] date:    Vaping Use    Vaping Use: Never used   Substance and Sexual Activity    Alcohol use: No    Drug use: No    Sexual activity: Not Currently   Other Topics Concern    None   Social History Narrative    None     Social Determinants of Health     Financial Resource Strain:     Difficulty of Paying Living Expenses:    Food Insecurity:     Worried About Running Out of Food in the Last Year:     Ran Out of Food in the Last Year:    Transportation Needs:     Lack of Transportation (Medical):      Lack of Transportation (Non-Medical):    Physical Activity:     Days of Exercise per Week:     Minutes of Exercise per Session:    Stress:     Feeling of Stress :    Social Connections:     Frequency of Communication with Friends and Family:     Frequency of Social Gatherings with Friends and Family:     Attends L[employer] Services:     Active Member of Clubs or Organizations:     Attends Club or Organization Meetings:     Marital Status:    Intimate Partner Violence:     Fear of Current or Ex-Partner:     Emotionally Abused:     Physically Abused:     Sexually Abused:         SURGICAL HISTORY  Past Surgical History:   Procedure Laterality Date   [facility]      CABG (4 Bypasses)    COLONOSCOPY  Last Done In 2019    Polyps Removed In Past    DENTAL SURGERY      Teeth Extracted In Past    FEMORAL BYPASS Right 3/25/2019    RIGHT FEMORAL POPLITEAL BYPASS performed by More Pena MD at [address] Left 2019    \"Stent\"    SKIN CANCER EXCISION      Basal Cell Skin Cancer Excised Nose    TONSILLECTOMY AND ADENOIDECTOMY  's       CURRENT MEDICATIONS  Current Outpatient Medications   Medication Sig Dispense Refill    omeprazole (PRILOSEC) 20 MG delayed release capsule Take 1 capsule by mouth Daily 60 minutes before breakfast X 2-3 months 30 capsule 2    Evolocumab (REPATHA) 140 MG/ML SOSY Inject 1 mL into the skin every 14 days 2 Syringe 5    carvedilol (COREG) 12.5 MG tablet TAKE 1 TABLET BY MOUTH TWICE DAILY WITH MEALS 180 tablet 1    lisinopril (PRINIVIL;ZESTRIL) 20 MG tablet Take 1 tablet by mouth once daily 90 tablet 1    amLODIPine (NORVASC) 5 MG tablet TAKE 1 TABLET BY MOUTH ONCE DAILY IN THE MORNING 90 tablet 1    clopidogrel (PLAVIX) 75 MG tablet Take 1/2 (one-half) tablet by mouth once daily 45 tablet 1    sildenafil (VIAGRA) 100 MG tablet Take 1 tablet by mouth as needed for Erectile Dysfunction 30 tablet 1    Tiotropium Bromide-Olodaterol (STIOLTO RESPIMAT) 2.5-2.5 MCG/ACT AERS Inhale 2 actuation into the lungs daily 1 Inhaler 5    albuterol sulfate  (90 Base) MCG/ACT inhaler Inhale 2 puffs into the lungs 4 times daily as needed for Wheezing 1 Inhaler 2    aspirin 81 MG tablet Take 81 mg by mouth nightly Over The Counter      lansoprazole (PREVACID) 30 MG delayed release capsule Take 1 capsule by mouth daily (Patient not taking: Reported on 7/15/2021) 30 capsule 5     No current facility-administered medications for this visit.        ALLERGIES  Allergies   Allergen Reactions    Statins      \"Paralyzes Muscles And Causes Muscle Weakness\"    Lipitor [Atorvastatin Calcium]      Muscle weakness    Spiriva Handihaler [Tiotropium Bromide Monohydrate]      Hoarse voice       RECENT LABS    Lab Results   Component Value Date    LABA1C 5.9 03/01/2021     Lab Results   Component Value Date    .6 03/01/2021       Lab Results   Component Value Date    CHOL 173 03/01/2021    CHOL 176 08/28/2020    CHOL 158 09/04/2019     Lab Results   Component Value Date    LDLCALC 105 (H) 03/01/2021    LDLCALC 102 (H) 08/28/2020    LDLCALC 86 09/04/2019       Lab Results   Component Value Date WBC 7.6 03/01/2021    HGB 15.4 03/01/2021    HCT 45.9 03/01/2021    MCV 96.7 03/01/2021     03/01/2021       PHYSICAL EXAM  /82   Pulse 65   Ht 5' 10\" (1.778 m)   Wt 223 lb 14.4 oz (101.6 kg)   SpO2 94%   BMI 32.13 kg/m²     PHYSICAL EXAMINATION:    Constitutional: Appears well-developed and well-nourished. No apparent distress    Mental status: Alert and awake, Oriented to person/place/time, Able to follow commands    Eyes: EOM normal, sclera normal, no visible discharge. HENT: Normocephalic, atraumatic. Mouth/Throat: Mucous membranes are moist. External Ears Normal   Neck: No visualized mass   Pulmonary/Chest: Respiratory effort normal. No visualized signs of difficulty breathing or respiratory distress   Musculoskeletal: Normal gait with no signs of ataxia. Normal range of motion of neck  Neurological:No Facial Asymmetry (Cranial nerve 7 motor function). No gaze palsy. Skin: No significant exanthematous lesions or discoloration noted on facial skin  Psychiatric: Normal Affect. No Hallucinations. ASSESSMENT & PLAN  1. Gastroesophageal reflux disease, unspecified whether esophagitis present  Try omeprazole 20 mg daily x2 weeks. If no improvement in symptoms, increase to 40 mg daily. If no improvement still after this, call and let me know. May consider trial of Nexium or referral to GI at that time. - omeprazole (PRILOSEC) 20 MG delayed release capsule; Take 1 capsule by mouth Daily 60 minutes before breakfast X 2-3 months  Dispense: 30 capsule; Refill: 2      No follow-ups on file.             Electronically signed by Kaye Sanderson PA-C on 7/15/2021

## 2021-07-15 NOTE — PATIENT INSTRUCTIONS
Personalized Preventive Plan for Torey Gilliam - 7/15/2021  Medicare offers a range of preventive health benefits. Some of the tests and screenings are paid in full while other may be subject to a deductible, co-insurance, and/or copay. Some of these benefits include a comprehensive review of your medical history including lifestyle, illnesses that may run in your family, and various assessments and screenings as appropriate. After reviewing your medical record and screening and assessments performed today your provider may have ordered immunizations, labs, imaging, and/or referrals for you. A list of these orders (if applicable) as well as your Preventive Care list are included within your After Visit Summary for your review. Other Preventive Recommendations:    · A preventive eye exam performed by an eye specialist is recommended every 1-2 years to screen for glaucoma; cataracts, macular degeneration, and other eye disorders. · A preventive dental visit is recommended every 6 months. · Try to get at least 150 minutes of exercise per week or 10,000 steps per day on a pedometer . · Order or download the FREE \"Exercise & Physical Activity: Your Everyday Guide\" from [employer]. Call 2-797.707.8780 or search [employer] online. · You need 5655-3421 mg of calcium and 0280-4801 IU of vitamin D per day. It is possible to meet your calcium requirement with diet alone, but a vitamin D supplement is usually necessary to meet this goal.  · When exposed to the sun, use a sunscreen that protects against both UVA and UVB radiation with an SPF of 30 or greater. Reapply every 2 to 3 hours or after sweating, drying off with a towel, or swimming. · Always wear a seat belt when traveling in a car. Always wear a helmet when riding a bicycle or motorcycle.

## 2021-07-15 NOTE — PROGRESS NOTES
Medicare Annual Wellness Visit  Name: Eamon Laura Date: 7/15/2021   MRN: [id number] Sex: Male   Age: 66 y.o. Ethnicity: Non-/Non    : 1950 Race: Darren Severino is here for Medicare AWV    Screenings for behavioral, psychosocial and functional/safety risks, and cognitive dysfunction are all negative except as indicated below. These results, as well as other patient data from the [address] form, are documented in Flowsheets linked to this Encounter. Allergies   Allergen Reactions    Statins      \"Paralyzes Muscles And Causes Muscle Weakness\"    Lipitor [Atorvastatin Calcium]      Muscle weakness    Spiriva Handihaler [Tiotropium Bromide Monohydrate]      Hoarse voice       Prior to Visit Medications    Medication Sig Taking?  Authorizing Provider   omeprazole (PRILOSEC) 20 MG delayed release capsule Take 1 capsule by mouth Daily 60 minutes before breakfast X 2-3 months  Aminah Huffman PA-C   Evolocumab (REPATHA) 140 MG/ML SOSY Inject 1 mL into the skin every 14 days  Ernesto Davis MD   lansoprazole (PREVACID) 30 MG delayed release capsule Take 1 capsule by mouth daily  Patient not taking: Reported on 7/15/2021  Carin Navas MD   carvedilol (COREG) 12.5 MG tablet TAKE 1 TABLET BY MOUTH TWICE DAILY WITH MEALS  Carin Navas MD   lisinopril (PRINIVIL;ZESTRIL) 20 MG tablet Take 1 tablet by mouth once daily  Carin Navas MD   amLODIPine (NORVASC) 5 MG tablet TAKE 1 TABLET BY MOUTH ONCE DAILY IN THE MORNING  Carin Navas MD   clopidogrel (PLAVIX) 75 MG tablet Take 1/2 (one-half) tablet by mouth once daily  Carin Navas MD   sildenafil (VIAGRA) 100 MG tablet Take 1 tablet by mouth as needed for Erectile Dysfunction  Carin Navas MD   Tiotropium Bromide-Olodaterol (STIOLTO RESPIMAT) 2.5-2.5 MCG/ACT AERS Inhale 2 actuation into the lungs daily  Carin Navas MD   albuterol sulfate  (90 Base) MCG/ACT inhaler Inhale 2 puffs into the lungs 4 times daily as needed for Wheezing  Stacey Pantoja MD   aspirin 81 MG tablet Take 81 mg by mouth nightly Over The Counter  Historical Provider, MD       Past Medical History:   Diagnosis Date    Acid reflux     Basal Cell Skin Cancer Excised Nose 1992    Basal Cell Skin Cancer Excised Nose    CAD (coronary artery disease)     Sees Dr. Lujean Ganser COPD (chronic obstructive pulmonary disease) ([facility] [address]     Essential hypertension     H/O cardiovascular stress test 5/29/2014    cardiolite-normal, no ischemia, EF 60%    H/O cardiovascular stress test 1/18/2015    cardiolite-normal, EF51%    H/O cardiovascular stress test 02/12/2018    EF60% normal study    H/O Doppler lower arterial ultrasound 04/22/2019    No significant occlusive arterial disease, Patent right femoral popliteal bypass graft with biphasic waveforms, bilateral abis show mild peripheral arterial disease    H/O Doppler ultrasound 6/1/2016    Lower Ext Arterial- normal study, mild stenosis in RLE    H/O echocardiogram 5/28/2014    EF35-45% mild MR, mild AV calcification    H/O echocardiogram 09/25/2019    EF 07-05%, grade I diastolic dysfunction, No significant valvular disease, no pericardial effusion     Heart attack ([facility] [address] 2014    History of cardiac cath 02/12/2019    PATENT RENAL ARTERIES DISTAL ABD AORTA ATHEROSCLEROTIC, ANEYRYSMAL, RCIA PARENT, REIA  PATENT, RT SFA OCCLUDED, DISTALLY RECONSTITUTES, 3 V RUNOFF,  LT MARIA ISABEL PATENT, CHRIS 70% TO 0%,  LT SFA 80% TO 0% WITH ATHRECTOMY TO PTA, LT POP PATENT,  3 V run off                        History of nuclear stress test 09/25/2019    EF 51%, Normal    History of percutaneous left heart catheterization 5/3/2014    RCA 99% distal stenosis EF55%    History of Peripheral Angiogram 03/07/2019    RCIA PATENT, REIA CALCIFIED 70% STENOSIS, RSFA OCCLUDED NO STUMP, PFA  PATENT FILL DISTAL SFA WITH COLLATERLS, RT POP PATENT, 3 V RUN OFF    General Health and ACP:  General  In general, how would you say your health is?: Good  In the past 7 days, have you experienced any of the following?  New or Increased Pain, New or Increased Fatigue, Loneliness, Social Isolation, Stress or Anger?: None of These  Do you get the social and emotional support that you need?: Yes  Do you have a Living Will?: Yes  Advance Directives     Power of Julian Bird Will ACP-Advance Directive ACP-Power of     Not on File Not on File Not on File Not on File      ·     Health Habits/Nutrition:  Health Habits/Nutrition  Do you exercise for at least 20 minutes 2-3 times per week?: Yes  Have you lost any weight without trying in the past 3 months?: No  Do you eat only one meal per day?: (!) Yes  Have you seen the dentist within the past year?: (!) No     Health Habits/Nutrition Interventions:  · Dental exam overdue:  patient encouraged to make appointment with his/her dentist    Hearing/Vision:  No exam data present  Hearing/Vision  Do you or your family notice any trouble with your hearing that hasn't been managed with hearing aids?: No  Do you have difficulty driving, watching TV, or doing any of your daily activities because of your eyesight?: No  Have you had an eye exam within the past year?: (!) No  Hearing/Vision Interventions:  · Vision concerns:  patient encouraged to make appointment with his/her eye specialist    Safety:  Safety  Do you have working smoke detectors?: Yes  Have all throw rugs been removed or fastened?: (!) No  Do you have non-slip mats or surfaces in all bathtubs/showers?: Yes  Do all of your stairways have a railing or banister?: Yes  Are your doorways, halls and stairs free of clutter?: Yes  Do you always fasten your seatbelt when you are in a car?: Yes  Safety Interventions:  · Home safety tips provided     Personalized Preventive Plan   Current Health Maintenance Status  Immunization History   Administered Date(s) Administered    COVID-19, Pfizer, PF, 30mcg/0.3mL 02/03/2021, 02/26/2021    Influenza Virus Vaccine 09/19/2020    Pneumococcal Conjugate 13-valent ([id number]) 12/16/2014    Pneumococcal Polysaccharide ([id number]) 03/06/2020    Tdap (Boostrix, Adacel) 01/22/2016        Health Maintenance   Topic Date Due    Shingles Vaccine (1 of 2) Never done   [employer] Visit (AWV)  Never done    Flu vaccine (1) 09/01/2021    Low dose CT lung screening  09/01/2021    A1C test (Diabetic or Prediabetic)  03/01/2022    Potassium monitoring  03/01/2022    Creatinine monitoring  03/01/2022    DTaP/Tdap/Td vaccine (2 - Td or Tdap) 01/22/2026    Lipid screen  03/01/2026    Colon cancer screen colonoscopy  09/06/2028    Pneumococcal 65+ years Vaccine  Completed    COVID-19 Vaccine  Completed    AAA screen  Completed    Hepatitis C screen  Completed    Hepatitis A vaccine  Aged Out    Hepatitis B vaccine  Aged Out    Hib vaccine  Aged Out    Meningococcal (ACWY) vaccine  Aged Out     Recommendations for [employer] Due: see orders and patient instructions/AVS.  . Recommended screening schedule for the next 5-10 years is provided to the patient in written form: see Patient Instructions/AVS.    There are no diagnoses linked to this encounter.

## 2021-09-01 ENCOUNTER — TELEPHONE (OUTPATIENT)
Dept: INFUSION THERAPY | Age: 71
End: 2021-09-01

## 2021-09-01 ENCOUNTER — VIRTUAL VISIT (OUTPATIENT)
Dept: FAMILY MEDICINE CLINIC | Age: 71
End: 2021-09-01
Payer: MEDICARE

## 2021-09-01 ENCOUNTER — HOSPITAL ENCOUNTER (OUTPATIENT)
Dept: INFUSION THERAPY | Age: 71
Setting detail: INFUSION SERIES
Discharge: HOME | End: 2021-09-01
Payer: MEDICARE

## 2021-09-01 VITALS
TEMPERATURE: 97.3 F | HEART RATE: 85 BPM | OXYGEN SATURATION: 96 % | RESPIRATION RATE: 20 BRPM | SYSTOLIC BLOOD PRESSURE: 146 MMHG | DIASTOLIC BLOOD PRESSURE: 88 MMHG

## 2021-09-01 DIAGNOSIS — I73.9 PVD (PERIPHERAL VASCULAR DISEASE) (HCC): ICD-10-CM

## 2021-09-01 DIAGNOSIS — N28.89 LEFT RENAL MASS: ICD-10-CM

## 2021-09-01 DIAGNOSIS — I10 ESSENTIAL HYPERTENSION: ICD-10-CM

## 2021-09-01 DIAGNOSIS — U07.1 COVID-19: ICD-10-CM

## 2021-09-01 DIAGNOSIS — K21.9 GASTROESOPHAGEAL REFLUX DISEASE, UNSPECIFIED WHETHER ESOPHAGITIS PRESENT: ICD-10-CM

## 2021-09-01 DIAGNOSIS — J43.9 PULMONARY EMPHYSEMA, UNSPECIFIED EMPHYSEMA TYPE (HCC): Primary | ICD-10-CM

## 2021-09-01 DIAGNOSIS — I25.118 ATHEROSCLEROTIC HEART DISEASE OF NATIVE CORONARY ARTERY WITH OTHER FORMS OF ANGINA PECTORIS (HCC): ICD-10-CM

## 2021-09-01 DIAGNOSIS — R73.9 HYPERGLYCEMIA: ICD-10-CM

## 2021-09-01 DIAGNOSIS — Z95.1 S/P CABG X 4: ICD-10-CM

## 2021-09-01 PROCEDURE — 3023F SPIROM DOC REV: CPT | Performed by: FAMILY MEDICINE

## 2021-09-01 PROCEDURE — 1123F ACP DISCUSS/DSCN MKR DOCD: CPT | Performed by: FAMILY MEDICINE

## 2021-09-01 PROCEDURE — M0243 CASIRIVI AND IMDEVI INFUSION: HCPCS

## 2021-09-01 PROCEDURE — 1036F TOBACCO NON-USER: CPT | Performed by: FAMILY MEDICINE

## 2021-09-01 PROCEDURE — G8427 DOCREV CUR MEDS BY ELIG CLIN: HCPCS | Performed by: FAMILY MEDICINE

## 2021-09-01 PROCEDURE — 3017F COLORECTAL CA SCREEN DOC REV: CPT | Performed by: FAMILY MEDICINE

## 2021-09-01 PROCEDURE — 2580000003 HC RX 258: Performed by: FAMILY MEDICINE

## 2021-09-01 PROCEDURE — 99214 OFFICE O/P EST MOD 30 MIN: CPT | Performed by: FAMILY MEDICINE

## 2021-09-01 PROCEDURE — 4040F PNEUMOC VAC/ADMIN/RCVD: CPT | Performed by: FAMILY MEDICINE

## 2021-09-01 PROCEDURE — G8926 SPIRO NO PERF OR DOC: HCPCS | Performed by: FAMILY MEDICINE

## 2021-09-01 PROCEDURE — G8417 CALC BMI ABV UP PARAM F/U: HCPCS | Performed by: FAMILY MEDICINE

## 2021-09-01 RX ORDER — AMLODIPINE BESYLATE 5 MG/1
TABLET ORAL
Qty: 90 TABLET | Refills: 1 | Status: SHIPPED | OUTPATIENT
Start: 2021-09-01 | End: 2022-02-15 | Stop reason: SDUPTHER

## 2021-09-01 RX ORDER — EVOLOCUMAB 140 MG/ML
140 INJECTION, SOLUTION SUBCUTANEOUS
Qty: 6 EACH | Refills: 1 | Status: SHIPPED | OUTPATIENT
Start: 2021-09-01 | End: 2021-12-27 | Stop reason: SDUPTHER

## 2021-09-01 RX ORDER — ALBUTEROL SULFATE 90 UG/1
2 AEROSOL, METERED RESPIRATORY (INHALATION) 4 TIMES DAILY PRN
Qty: 3 EACH | Refills: 1 | Status: SHIPPED | OUTPATIENT
Start: 2021-09-01 | End: 2022-08-22 | Stop reason: SDUPTHER

## 2021-09-01 RX ORDER — CARVEDILOL 12.5 MG/1
TABLET ORAL
Qty: 180 TABLET | Refills: 1 | Status: SHIPPED | OUTPATIENT
Start: 2021-09-01 | End: 2022-02-15 | Stop reason: SDUPTHER

## 2021-09-01 RX ORDER — LISINOPRIL 20 MG/1
TABLET ORAL
Qty: 90 TABLET | Refills: 1 | Status: SHIPPED | OUTPATIENT
Start: 2021-09-01 | End: 2022-02-15 | Stop reason: SDUPTHER

## 2021-09-01 RX ORDER — CLOPIDOGREL BISULFATE 75 MG/1
TABLET ORAL
Qty: 45 TABLET | Refills: 1 | Status: SHIPPED | OUTPATIENT
Start: 2021-09-01 | End: 2022-02-15 | Stop reason: SDUPTHER

## 2021-09-01 RX ORDER — SODIUM CHLORIDE 9 MG/ML
20 INJECTION, SOLUTION INTRAVENOUS CONTINUOUS PRN
Status: DISCONTINUED | OUTPATIENT
Start: 2021-09-01 | End: 2021-09-02 | Stop reason: HOSPADM

## 2021-09-01 RX ADMIN — SODIUM CHLORIDE: 9 INJECTION, SOLUTION INTRAVENOUS at 14:55

## 2021-09-01 ASSESSMENT — ENCOUNTER SYMPTOMS
ABDOMINAL PAIN: 0
TROUBLE SWALLOWING: 0
NAUSEA: 0
BLOOD IN STOOL: 0
DIARRHEA: 0
EYE PAIN: 0
VOMITING: 0
SHORTNESS OF BREATH: 0
WHEEZING: 0
CHEST TIGHTNESS: 0
COUGH: 1

## 2021-09-01 NOTE — TELEPHONE ENCOUNTER
L/M FOR PT  NUMBER TO CALL AND SCHEDULE REGENERON INFUSION. OTHER PHONE NUMBER HAS BEEN DISCONTINUED.

## 2021-09-01 NOTE — PROGRESS NOTES
9/1/2021    Kizzy Ayers    Chief Complaint   Patient presents with    Positive For Covid-19    6 Month Follow-Up       HPI  History was obtained from the patient. Ingrid Lopez is a 66 y.o. male who presents today with history of being positive for COVID-19 for about 5 days. He has had some cough and fatigue. Little bit of dizziness but not severe. He had had his previous Covid vaccination oxygen oximetry on room air about 93%. Labs earlier this year look good. Patient denies acute issues but feels though his wife is little worse off than he is but she has not had her official Covid test --she has Parkinson's. Ingrid Lopez has multiple cardiovascular risk factors and high risk factors for Covid and the fact that he is now 66 has a history of hypertension, coronary disease, and COPD. After discussion I do believe he would be a good candidate for monoclonal antibody therapy and this will be set up. I discussed it with him and he is agreeable. REVIEW OF SYMPTOMS    Review of Systems   Constitutional: Positive for fatigue. Negative for activity change. HENT: Negative for congestion, hearing loss, mouth sores and trouble swallowing. Eyes: Negative for pain and visual disturbance. Respiratory: Positive for cough. Negative for chest tightness, shortness of breath and wheezing. Cardiovascular: Negative for chest pain and palpitations. Gastrointestinal: Negative for abdominal pain, blood in stool, diarrhea, nausea and vomiting. Endocrine: Negative for polydipsia. Genitourinary: Negative for dysuria, frequency and urgency. Musculoskeletal: Negative for arthralgias, gait problem and neck stiffness. Skin: Negative for rash. Allergic/Immunologic: Negative for environmental allergies. Neurological: Positive for weakness. Negative for dizziness, seizures and speech difficulty. Hematological: Does not bruise/bleed easily.    Psychiatric/Behavioral: Negative for agitation, confusion, dysphoric mood, hallucinations and suicidal ideas.        PAST MEDICAL HISTORY  Past Medical History:   Diagnosis Date    Acid reflux     Basal Cell Skin Cancer Excised Nose 1992    Basal Cell Skin Cancer Excised Nose    CAD (coronary artery disease)     Sees Dr. Mackenzie Montez COPD (chronic obstructive pulmonary disease) ([facility]     Essential hypertension     H/O cardiovascular stress test 5/29/2014    cardiolite-normal, no ischemia, EF 60%    H/O cardiovascular stress test 1/18/2015    cardiolite-normal, EF51%    H/O cardiovascular stress test 02/12/2018    EF60% normal study    H/O Doppler lower arterial ultrasound 04/22/2019    No significant occlusive arterial disease, Patent right femoral popliteal bypass graft with biphasic waveforms, bilateral abis show mild peripheral arterial disease    H/O Doppler ultrasound 6/1/2016    Lower Ext Arterial- normal study, mild stenosis in RLE    H/O echocardiogram 5/28/2014    EF35-45% mild MR, mild AV calcification    H/O echocardiogram 09/25/2019    EF 46-95%, grade I diastolic dysfunction, No significant valvular disease, no pericardial effusion     Heart attack ([facility] [address] 2014    History of cardiac cath 02/12/2019    PATENT RENAL ARTERIES DISTAL ABD AORTA ATHEROSCLEROTIC, ANEYRYSMAL, RCIA PARENT, REIA  PATENT, RT SFA OCCLUDED, DISTALLY RECONSTITUTES, 3 V RUNOFF,  LT MARIA ISABEL PATENT, CHRIS 70% TO 0%,  LT SFA 80% TO 0% WITH ATHRECTOMY TO PTA, LT POP PATENT,  3 V run off                        History of nuclear stress test 09/25/2019    EF 51%, Normal    History of percutaneous left heart catheterization 5/3/2014    RCA 99% distal stenosis EF55%    History of Peripheral Angiogram 03/07/2019    RCIA PATENT, REIA CALCIFIED 70% STENOSIS, RSFA OCCLUDED NO STUMP, PFA  PATENT FILL DISTAL SFA WITH COLLATERLS, RT POP PATENT, 3 V RUN OFF    Hyperlipidemia     Lumbosacral spondylosis     Primary erectile dysfunction     S/P CABG x 4 2014    vein graft diagonal coronary artery,vein graft distal RCA,vein graft obtuse marginal coronary artery and left internal mammary artery to the left anterior descending    Shortness of breath on exertion     Teeth missing     Upper And Lower    Wears dentures     Full Upper    Wears glasses        FAMILY HISTORY  Family History   Adopted: Yes   Problem Relation Age of Onset    Heart Disease Mother     Hypertension Mother     Diabetes Mother        SOCIAL HISTORY  Social History     Socioeconomic History    Marital status:      Spouse name: Not on file    Number of children: Not on file    Years of education: Not on file    Highest education level: Not on file   Occupational History    Not on file   Tobacco Use    Smoking status: Former Smoker     Packs/day: 1.00     Years: 55.00     Pack years: 55.00     Types: Cigarettes, Pipe, Cigars     Start date:      Quit date:      Years since quittin.6    Smokeless tobacco: Former User     Types: Chew     Quit date:    Vaping Use    Vaping Use: Never used   Substance and Sexual Activity    Alcohol use: No    Drug use: No    Sexual activity: Not Currently   Other Topics Concern    Not on file   Social History Narrative    Not on file     Social Determinants of Health     Financial Resource Strain:     Difficulty of Paying Living Expenses:    Food Insecurity:     Worried About Running Out of Food in the Last Year:     Ran Out of Food in the Last Year:    Transportation Needs:     Lack of Transportation (Medical):      Lack of Transportation (Non-Medical):    Physical Activity:     Days of Exercise per Week:     Minutes of Exercise per Session:    Stress:     Feeling of Stress :    Social Connections:     Frequency of Communication with Friends and Family:     Frequency of Social Gatherings with Friends and Family:     Attends Yarsani Services:     Active Member of Clubs or Organizations:     Attends Club or Organization Meetings:     Marital Status:    Intimate Partner Violence:     Fear of Current or Ex-Partner:     Emotionally Abused:     Physically Abused:     Sexually Abused:         SURGICAL HISTORY  Past Surgical History:   Procedure Laterality Date   [address]  2014    CABG (4 Bypasses)    COLONOSCOPY  Last Done In 2019    Polyps Removed In Past    DENTAL SURGERY      Teeth Extracted In Past    FEMORAL BYPASS Right 3/25/2019    RIGHT FEMORAL POPLITEAL BYPASS performed by Pan Hess MD at [address] [facility] Dennys Bonner Left 02/12/2019    \"Stent\"    SKIN CANCER EXCISION  1992    Basal Cell Skin Cancer Excised Nose    TONSILLECTOMY AND ADENOIDECTOMY  1950's                 CURRENT MEDICATIONS  Current Outpatient Medications   Medication Sig Dispense Refill    albuterol sulfate  (90 Base) MCG/ACT inhaler Inhale 2 puffs into the lungs 4 times daily as needed for Wheezing 3 each 1    amLODIPine (NORVASC) 5 MG tablet TAKE 1 TABLET BY MOUTH ONCE DAILY IN THE MORNING 90 tablet 1    carvedilol (COREG) 12.5 MG tablet TAKE 1 TABLET BY MOUTH TWICE DAILY WITH MEALS 180 tablet 1    clopidogrel (PLAVIX) 75 MG tablet Take 1/2 (one-half) tablet by mouth once daily 45 tablet 1    Evolocumab (REPATHA) 140 MG/ML SOSY Inject 1 mL into the skin every 14 days 6 each 1    lisinopril (PRINIVIL;ZESTRIL) 20 MG tablet Take 1 tablet by mouth once daily 90 tablet 1    tiotropium-olodaterol (STIOLTO RESPIMAT) 2.5-2.5 MCG/ACT AERS Inhale 2 puffs into the lungs daily 3 each 1    sildenafil (VIAGRA) 100 MG tablet Take 1 tablet by mouth as needed for Erectile Dysfunction 30 tablet 1    aspirin 81 MG tablet Take 81 mg by mouth nightly Over The Counter      omeprazole (PRILOSEC) 20 MG delayed release capsule Take 1 capsule by mouth Daily 60 minutes before breakfast X 2-3 months (Patient not taking: Reported on 9/1/2021) 30 capsule 2    lansoprazole (PREVACID) 30 MG delayed release capsule Take 1 capsule by mouth daily (Patient not taking: Reported on 7/15/2021) 30 capsule 5     No Behavior: Behavior normal.         Thought Content: Thought content normal.         ASSESSMENT & PLAN     Diagnosis Orders   1. Pulmonary emphysema, unspecified emphysema type ([facility] [address]     2. Essential hypertension  amLODIPine (NORVASC) 5 MG tablet    carvedilol (COREG) 12.5 MG tablet    Evolocumab (REPATHA) 140 MG/ML SOSY    lisinopril (PRINIVIL;ZESTRIL) 20 MG tablet    COMPREHENSIVE METABOLIC PANEL   3. S/P CABG x 4  clopidogrel (PLAVIX) 75 MG tablet    Evolocumab (REPATHA) 140 MG/ML SOSY   4. Atherosclerotic heart disease of native coronary artery with other forms of angina pectoris (HCC)  Evolocumab (REPATHA) 140 MG/ML SOSY    LIPID PANEL   5. PVD (peripheral vascular disease) (HCC)  Evolocumab (REPATHA) 140 MG/ML SOSY   6. Hyperglycemia  HEMOGLOBIN A1C   7. Gastroesophageal reflux disease, unspecified whether esophagitis present     8. Left renal mass  CTA ABDOMEN W WO CONTRAST   9. COVID-19     At this point refills will be provided. We will check CMP, lipid, and A1c in a few weeks. We will set up monoclonal antibody administration as outpatient at the infusion center. Patient had a kidney mass noted [facility] another CT of abdomen to look at left renal mass sometime in about a month. He is to continue on usual regimen  andcall with questions or problems. Continue to monitor his oximetry. Reminded him that he will need Covid booster when available about 8 months after his second Covid shot. Return in about 3 months (around 12/1/2021).          Electronically signed by Sabrina Toth MD on 9/1/2021

## 2021-09-02 ENCOUNTER — CARE COORDINATION (OUTPATIENT)
Dept: CARE COORDINATION | Age: 71
End: 2021-09-02

## 2021-09-02 NOTE — CARE COORDINATION
[facility] Transitions Follow Up Call    2021    Patient: Hamzah Mckeon Patient : 1950   MRN: <[id number]>  Reason for Admission:   COVID-19+  Discharge Date: 21    Patient contacted regarding COVID-19 risk, exposure, diagnosis, pulse oximeter ordered at discharge and monoclonal antibody infusion follow up. Discussed COVID-19 related testing which was available at this time. Test results were positive. Patient informed of results, if available? No.      Ambulatory Care Manager contacted the patient by telephone to perform post discharge assessment. Call within 2 business days of discharge: Yes. Verified name and  with patient as identifiers. Provided introduction to self, and explanation of the CTN/ACM role, and reason for call due to risk factors for infection and/or exposure to COVID-19. Symptoms reviewed with patient who verbalized the following symptoms: no new symptoms. Patient reports that he is feeling much better. Encouraged rest/ hydration. Instructed on s/s to report to MD.        Due to no new or worsening symptoms encounter was not routed to provider for escalation. Discussed follow-up appointments. If no appointment was previously scheduled, appointment scheduling offered: Yes. [facility] follow up appointment(s):   Future Appointments   Date Time Provider Monica Centeno   2021  8:40 AM Ernesto Davis MD [facility]     Non-[facility] follow up appointment(s):     Non-face-to-face services provided:  Education of patient/family/caregiver/guardian to support self-management-1     Advance Care Planning:   Does patient have an Advance Directive:  not on file. Educated patient about risk for severe COVID-19 due to risk factors according to CDC guidelines. ACM reviewed discharge instructions, medical action plan and red flag symptoms with the patient who verbalized understanding. Discussed COVID vaccination status: Yes.  Education provided on COVID-19 vaccination as appropriate. Discussed exposure protocols and quarantine with CDC Guidelines. Patient was given an opportunity to verbalize any questions and concerns and agrees to contact ACM or health care provider for questions related to their healthcare. Reviewed and educated patient on any new and changed medications related to discharge diagnosis. No questions reported. Was patient discharged with a pulse oximeter? Yes. Discussed and confirmed pulse oximeter discharge instructions and when to notify provider or seek emergency care. ACM provided contact information. No further follow-up call identified based on severity of symptoms and risk factors.        Care Transitions ED Follow Up    Care Transitions Interventions

## 2021-10-04 ENCOUNTER — TELEPHONE (OUTPATIENT)
Dept: FAMILY MEDICINE CLINIC | Age: 71
End: 2021-10-04

## 2021-10-04 NOTE — TELEPHONE ENCOUNTER
Patient wants to know why a CT scan has been ordered for him-----Central Scheduling Dept just called to schedule this.     Please call and advise-----Phone:  [phone]

## 2021-10-08 ENCOUNTER — HOSPITAL ENCOUNTER (OUTPATIENT)
Dept: CT IMAGING | Age: 71
Discharge: HOME | End: 2021-10-08
Payer: MEDICARE

## 2021-10-08 DIAGNOSIS — N28.89 LEFT RENAL MASS: ICD-10-CM

## 2021-10-08 LAB
GFR AFRICAN AMERICAN: >60 ML/MIN/1.73M2
GFR NON-AFRICAN AMERICAN: 59 ML/MIN/1.73M2
POC CREATININE: 1.2 MG/DL (ref 0.9–1.3)

## 2021-10-08 PROCEDURE — 2580000003 HC RX 258: Performed by: FAMILY MEDICINE

## 2021-10-08 PROCEDURE — 74170 CT ABD WO CNTRST FLWD CNTRST: CPT

## 2021-10-08 PROCEDURE — 6360000004 HC RX CONTRAST MEDICATION: Performed by: FAMILY MEDICINE

## 2021-10-08 RX ORDER — SODIUM CHLORIDE 0.9 % (FLUSH) 0.9 %
5-40 SYRINGE (ML) INJECTION PRN
Status: DISCONTINUED | OUTPATIENT
Start: 2021-10-08 | End: 2021-10-09 | Stop reason: HOSPADM

## 2021-10-08 RX ADMIN — IOPAMIDOL 75 ML: 755 INJECTION, SOLUTION INTRAVENOUS at 11:25

## 2021-10-08 RX ADMIN — SODIUM CHLORIDE, PRESERVATIVE FREE 10 ML: 5 INJECTION INTRAVENOUS at 11:25

## 2021-10-12 ENCOUNTER — TELEPHONE (OUTPATIENT)
Dept: FAMILY MEDICINE CLINIC | Age: 71
End: 2021-10-12

## 2021-11-05 ENCOUNTER — OFFICE VISIT (OUTPATIENT)
Dept: CARDIOLOGY CLINIC | Age: 71
End: 2021-11-05
Payer: MEDICARE

## 2021-11-05 VITALS
SYSTOLIC BLOOD PRESSURE: 124 MMHG | HEART RATE: 62 BPM | DIASTOLIC BLOOD PRESSURE: 86 MMHG | BODY MASS INDEX: 30.94 KG/M2 | HEIGHT: 71 IN | WEIGHT: 221 LBS

## 2021-11-05 DIAGNOSIS — Z86.79 H/O: HYPERTENSION: ICD-10-CM

## 2021-11-05 DIAGNOSIS — Z95.1 S/P CABG X 4: Primary | ICD-10-CM

## 2021-11-05 PROCEDURE — 3017F COLORECTAL CA SCREEN DOC REV: CPT | Performed by: INTERNAL MEDICINE

## 2021-11-05 PROCEDURE — 1123F ACP DISCUSS/DSCN MKR DOCD: CPT | Performed by: INTERNAL MEDICINE

## 2021-11-05 PROCEDURE — 4040F PNEUMOC VAC/ADMIN/RCVD: CPT | Performed by: INTERNAL MEDICINE

## 2021-11-05 PROCEDURE — G8427 DOCREV CUR MEDS BY ELIG CLIN: HCPCS | Performed by: INTERNAL MEDICINE

## 2021-11-05 PROCEDURE — G8484 FLU IMMUNIZE NO ADMIN: HCPCS | Performed by: INTERNAL MEDICINE

## 2021-11-05 PROCEDURE — G8417 CALC BMI ABV UP PARAM F/U: HCPCS | Performed by: INTERNAL MEDICINE

## 2021-11-05 PROCEDURE — 93000 ELECTROCARDIOGRAM COMPLETE: CPT | Performed by: INTERNAL MEDICINE

## 2021-11-05 PROCEDURE — 1036F TOBACCO NON-USER: CPT | Performed by: INTERNAL MEDICINE

## 2021-11-05 PROCEDURE — 99214 OFFICE O/P EST MOD 30 MIN: CPT | Performed by: INTERNAL MEDICINE

## 2021-11-05 NOTE — PROGRESS NOTES
CARDIOLOGY NOTE      11/5/2021    RE: Keenan Cabrera  (1950)                               TO:  Dr. Jessica Rangel MD            CHIEF Refugio Banda is a 66 y.o. male who was seen today for management of  PVD                                    :   Patient is here for    - Coronary artery disease, does not have chest pain. Patient is  compliant with prescribed medicines. - Hypertension,is  well controlled, pt is  compliant with medicines  - Hyperlipidimea, lipids are in acceptable range.  Pt  is  compliant with medicines  - PVD  stable                 The patient does not have cardiac complaints  Had covid    Keenan Cabrera has the following history recorded in care path:  Patient Active Problem List    Diagnosis Date Noted    Hyperlipidemia     GERD (gastroesophageal reflux disease) 08/28/2020    Hyperglycemia 08/28/2020    Fatigue 09/17/2019    Shortness of breath 09/17/2019    [facility]     Lumbosacral spondylosis     Primary erectile dysfunction     Atherosclerotic heart disease of native coronary artery with other forms of angina pectoris ([facility] [address] 06/29/2018    H/O cardiovascular stress test 05/29/2014    S/P CABG x 4 05/05/2014    History of percutaneous left heart catheterization 05/03/2014    Hypertension 05/02/2014    Tobacco abuse 05/02/2014    NSTEMI (non-ST elevated myocardial infarction) ([facility] [address] 05/02/2014     Current Outpatient Medications   Medication Sig Dispense Refill    albuterol sulfate  (90 Base) MCG/ACT inhaler Inhale 2 puffs into the lungs 4 times daily as needed for Wheezing 3 each 1    amLODIPine (NORVASC) 5 MG tablet TAKE 1 TABLET BY MOUTH ONCE DAILY IN THE MORNING 90 tablet 1    carvedilol (COREG) 12.5 MG tablet TAKE 1 TABLET BY MOUTH TWICE DAILY WITH MEALS 180 tablet 1    clopidogrel (PLAVIX) 75 MG tablet Take 1/2 (one-half) tablet by mouth once daily 45 tablet 1    Evolocumab (REPATHA) 140 MG/ML SOSY Inject 1 mL into the skin every 14 days 6 each 1    lisinopril (PRINIVIL;ZESTRIL) 20 MG tablet Take 1 tablet by mouth once daily 90 tablet 1    tiotropium-olodaterol (STIOLTO RESPIMAT) 2.5-2.5 MCG/ACT AERS Inhale 2 puffs into the lungs daily 3 each 1    sildenafil (VIAGRA) 100 MG tablet Take 1 tablet by mouth as needed for Erectile Dysfunction 30 tablet 1    aspirin 81 MG tablet Take 81 mg by mouth nightly Over The Counter      omeprazole (PRILOSEC) 20 MG delayed release capsule Take 1 capsule by mouth Daily 60 minutes before breakfast X 2-3 months (Patient not taking: Reported on 9/1/2021) 30 capsule 2    lansoprazole (PREVACID) 30 MG delayed release capsule Take 1 capsule by mouth daily (Patient not taking: Reported on 7/15/2021) 30 capsule 5     No current facility-administered medications for this visit.      Allergies: Statins, Lansoprazole, Lipitor [atorvastatin calcium], and Spiriva handihaler [tiotropium bromide monohydrate]  Past Medical History:   Diagnosis Date    Acid reflux     Basal Cell Skin Cancer Excised Nose 1992    Basal Cell Skin Cancer Excised Nose    CAD (coronary artery disease)     [facility] Dr. Tawanda Ruiz [facility]     Essential hypertension     H/O cardiovascular stress test 5/29/2014    cardiolite-normal, no ischemia, EF 60%    H/O cardiovascular stress test 1/18/2015    cardiolite-normal, EF51%    H/O cardiovascular stress test 02/12/2018    EF60% normal study    H/O Doppler lower arterial ultrasound 04/22/2019    No significant occlusive arterial disease, Patent right femoral popliteal bypass graft with biphasic waveforms, bilateral abis show mild peripheral arterial disease    H/O Doppler ultrasound 6/1/2016    Lower Ext Arterial- normal study, mild stenosis in RLE    H/O echocardiogram 5/28/2014    EF35-45% mild MR, mild AV calcification    H/O echocardiogram 09/25/2019    EF 53-39%, grade I diastolic dysfunction, No significant valvular disease, no pericardial effusion     Heart attack ([facility] [address] 2014    History of cardiac cath 02/12/2019    PATENT RENAL ARTERIES DISTAL ABD AORTA ATHEROSCLEROTIC, ANEYRYSMAL, RCIA PARENT, REIA  PATENT, RT SFA OCCLUDED, DISTALLY RECONSTITUTES, 3 V RUNOFF,  LT MARIA ISABEL PATENT, CHRIS 70% TO 0%,  LT SFA 80% TO 0% WITH ATHRECTOMY TO PTA, LT POP PATENT,  3 V run off                        History of nuclear stress test 09/25/2019    EF 51%, Normal    History of percutaneous left heart catheterization 5/3/2014    RCA 99% distal stenosis EF55%    History of Peripheral Angiogram 03/07/2019    RCIA PATENT, REIA CALCIFIED 70% STENOSIS, RSFA OCCLUDED NO STUMP, PFA  PATENT FILL DISTAL SFA WITH COLLATERLS, RT POP PATENT, 3 V RUN OFF    Hyperlipidemia     Lumbosacral spondylosis     Primary erectile dysfunction     S/P CABG x 4 2014    vein graft diagonal coronary artery,vein graft distal RCA,vein graft obtuse marginal coronary artery and left internal mammary artery to the left anterior descending    Shortness of breath on exertion     Teeth missing     Upper And Lower    Wears dentures     Full Upper    Wears glasses      Past Surgical History:   Procedure Laterality Date    CARDIAC SURGERY  2014    CABG (4 Bypasses)    COLONOSCOPY  Last Done In 2019    Polyps Removed In Past    DENTAL SURGERY      Teeth Extracted In Past    FEMORAL BYPASS Right 3/25/2019    RIGHT FEMORAL POPLITEAL BYPASS performed by Shanice Dominguez MD at [facility] Left 02/12/2019    \"Stent\"    SKIN CANCER EXCISION  1992    Basal Cell Skin Cancer Excised Nose    TONSILLECTOMY AND ADENOIDECTOMY  65's      As reviewed   Family History   Adopted: Yes   Problem Relation Age of Onset    Heart Disease Mother     Hypertension Mother     Diabetes Mother      Social History     Tobacco Use    Smoking status: Former Smoker     Packs/day: 1.00     Years: 55.00     Pack years: 55.00     Types: Cigarettes, Pipe, Cigars     Start date: 64     Quit date:      Years since quittin.8    Smokeless tobacco: Former User     Types: Chew     Quit date:    Substance Use Topics    Alcohol use: No     Comment: Caffeine: 3-4 cups of coffee daily       Review of Systems:    Constitutional: Negative for diaphoresis and fatigue  Psychological:Negative for anxiety or depression  HENT: Negative for headaches, nasal congestion, sinus pain or vertigo  Eyes: Negative for visual disturbance. Endocrine: Negative for polydipsia/polyuria  Respiratory: Negative for shortness of breath  Cardiovascular: Negative for chest pain, dyspnea on exertion, claudication, edema, irregular heartbeat, murmur, palpitations or shortness of breath  Gastrointestinal: Negative for abdominal pain or heartburn  Genito-Urinary: Negative for urinary frequency/urgency  Musculoskeletal: Negative for muscle pain, muscular weakness, negative for pain in arm and leg or swelling in foot and leg  Neurological: Negative for dizziness, headaches, memory loss, numbness/tingling, visual changes, syncope  Dermatological: Negative for rash    Objective:  /86 (Site: Left Upper Arm, Position: Sitting, Cuff Size: Large Adult)   Pulse 62   Ht 5' 11\" (1.803 m)   Wt 221 lb (100.2 kg)   BMI 30.82 kg/m²   Wt Readings from Last 3 Encounters:   21 221 lb (100.2 kg)   07/15/21 223 lb 14.4 oz (101.6 kg)   21 223 lb 6.4 oz (101.3 kg)     Body mass index is 30.82 kg/m². GENERAL - Alert, oriented, pleasant, in no apparent distress. EYES: No jaundice, no conjunctival pallor. SKIN: It is warm & dry. No rashes. No Echhymosis    HEENT  No clinically significant abnormalities seen. Neck - Supple. No jugular venous distention noted. No carotid bruits. Cardiovascular  Normal S1 and S2 without obvious murmur or gallop. Extremities - No cyanosis, clubbing, or significant edema. Pulmonary  No respiratory distress. No wheezes or rales.     Abdomen  No masses, tenderness, or organomegaly. Musculoskeletal  No significant edema. No joint deformities. No muscle wasting. Neurologic  Cranial nerves II through XII are grossly intact. There were no gross focal neurologic abnormalities. Lab Review   No results found for: CKTOTAL, CKMB, CKMBINDEX, TROPONINT  BNP:    Lab Results   Component Value Date    BNP 22 05/02/2014     PT/INR:    Lab Results   Component Value Date    INR 0.97 03/18/2019     Lab Results   Component Value Date    LABA1C 5.9 03/01/2021    LABA1C 6.3 08/28/2020     Lab Results   Component Value Date    WBC 7.6 03/01/2021    HCT 45.9 03/01/2021    MCV 96.7 03/01/2021     03/01/2021     Lab Results   Component Value Date    CHOL 173 03/01/2021    TRIG 126 03/01/2021    HDL 43 03/01/2021    LDLCALC 105 (H) 03/01/2021    LDLDIRECT 155 (H) 05/03/2014     Lab Results   Component Value Date    ALT 12 03/01/2021    AST 17 03/01/2021     BMP:    Lab Results   Component Value Date     03/01/2021    K 5.5 03/01/2021     03/01/2021    CO2 28 03/01/2021    BUN 20 03/01/2021    CREATININE 1.2 10/08/2021    CREATININE 1.0 03/01/2021     CMP:   Lab Results   Component Value Date     03/01/2021    K 5.5 03/01/2021     03/01/2021    CO2 28 03/01/2021    BUN 20 03/01/2021    PROT 7.4 03/01/2021    PROT 7.1 01/25/2011     TSH:    Lab Results   Component Value Date    TSHHS 0.883 06/01/2013           Impression:    1. S/P CABG x 4    2.  H/O: hypertension       Patient Active Problem List   Diagnosis Code    Hypertension I10    Tobacco abuse Z72.0    NSTEMI (non-ST elevated myocardial infarction) ([facility] I21.4    S/P CABG x 4 Z95.1    History of percutaneous left heart catheterization Z98.890    H/O cardiovascular stress test Z92.89    Atherosclerotic heart disease of native coronary artery with other forms of angina pectoris ([facility] I25.118    COPD (chronic obstructive pulmonary disease) ([facility] J44.9    Lumbosacral spondylosis M47.817    Primary erectile dysfunction N52.9    Fatigue R53.83    Shortness of breath R06.02    GERD (gastroesophageal reflux disease) K21.9    Hyperglycemia R73.9    Hyperlipidemia E78.5       Assessment & Plan:       - PVD   Had PTA   Of LSFA and Rt FP bypass , per art duplex per CTS      -     CORONARY ARTERY DISEASE:  asymptomatic     All available  tests in chart reviewed. Management discussed . Testing ordered  no                                 -  Hypertension: Patients blood pressure is normal. Patient is advised about low sodium diet. Present medical regimen will not be changed. -  LIPID MANAGEMENT:  Available lipid  lab data reviewed  and patient was given dietary advice. NCEP- ATP III guidelines reviewed with patient. -   Changes  in medicines made:  Kandice Gerber MA  [facility]

## 2021-11-05 NOTE — LETTER
Demario Whyte [facility]  1950  [id number]    Have you had any Chest Pain that is not new? - No    Have you had any Shortness of Breath - Yes (upon exertion)   When on exertion    Have you had any dizziness - No    Have you had any palpitations that are not new?  - No    Do you have any edema - swelling in No      Do you have a surgery or procedure scheduled in the near future - No not tested due to/pt sedated

## 2021-11-05 NOTE — PATIENT INSTRUCTIONS
**It is YOUR responsibilty to bring medication bottles and/or updated medication list to [address] [place] [address]. This will allow us to better serve you and all your healthcare needs**    Please be informed that if you contact our office outside of normal business hours the physician on call cannot help with any scheduling or rescheduling issues, procedure instruction questions or any type of medication issue. We advise you for any urgent/emergency that you go to the nearest emergency room!     PLEASE CALL OUR OFFICE DURING NORMAL BUSINESS HOURS    Monday - Friday   8 am to 5 pm    [place]: [place] [address]: [phone]    [place]:  [phone]

## 2021-12-27 DIAGNOSIS — I10 ESSENTIAL HYPERTENSION: ICD-10-CM

## 2021-12-27 DIAGNOSIS — Z95.1 S/P CABG X 4: ICD-10-CM

## 2021-12-27 DIAGNOSIS — I73.9 PVD (PERIPHERAL VASCULAR DISEASE) (HCC): ICD-10-CM

## 2021-12-27 DIAGNOSIS — I25.118 ATHEROSCLEROTIC HEART DISEASE OF NATIVE CORONARY ARTERY WITH OTHER FORMS OF ANGINA PECTORIS (HCC): ICD-10-CM

## 2021-12-27 RX ORDER — EVOLOCUMAB 140 MG/ML
140 INJECTION, SOLUTION SUBCUTANEOUS
Qty: 6 EACH | Refills: 1 | OUTPATIENT
Start: 2021-12-27

## 2022-02-08 ENCOUNTER — TELEPHONE (OUTPATIENT)
Dept: CARDIOLOGY CLINIC | Age: 72
End: 2022-02-08

## 2022-02-15 ENCOUNTER — OFFICE VISIT (OUTPATIENT)
Dept: FAMILY MEDICINE CLINIC | Age: 72
End: 2022-02-15
Payer: MEDICARE

## 2022-02-15 VITALS
SYSTOLIC BLOOD PRESSURE: 120 MMHG | DIASTOLIC BLOOD PRESSURE: 82 MMHG | BODY MASS INDEX: 30.66 KG/M2 | HEIGHT: 71 IN | OXYGEN SATURATION: 95 % | WEIGHT: 219 LBS | HEART RATE: 60 BPM

## 2022-02-15 DIAGNOSIS — I25.118 ATHEROSCLEROTIC HEART DISEASE OF NATIVE CORONARY ARTERY WITH OTHER FORMS OF ANGINA PECTORIS (HCC): ICD-10-CM

## 2022-02-15 DIAGNOSIS — I10 ESSENTIAL HYPERTENSION: Primary | ICD-10-CM

## 2022-02-15 DIAGNOSIS — J43.9 PULMONARY EMPHYSEMA, UNSPECIFIED EMPHYSEMA TYPE (HCC): ICD-10-CM

## 2022-02-15 DIAGNOSIS — R73.9 HYPERGLYCEMIA: ICD-10-CM

## 2022-02-15 DIAGNOSIS — I10 ESSENTIAL HYPERTENSION: ICD-10-CM

## 2022-02-15 LAB
HCT VFR BLD CALC: 46.3 % (ref 40.5–52.5)
HEMOGLOBIN: 15.9 G/DL (ref 13.5–17.5)
MCH RBC QN AUTO: 33.4 PG (ref 26–34)
MCHC RBC AUTO-ENTMCNC: 34.3 G/DL (ref 31–36)
MCV RBC AUTO: 97.5 FL (ref 80–100)
PDW BLD-RTO: 13.7 % (ref 12.4–15.4)
PLATELET # BLD: 216 K/UL (ref 135–450)
PMV BLD AUTO: 7 FL (ref 5–10.5)
RBC # BLD: 4.75 M/UL (ref 4.2–5.9)
WBC # BLD: 6.5 K/UL (ref 4–11)

## 2022-02-15 PROCEDURE — 3017F COLORECTAL CA SCREEN DOC REV: CPT | Performed by: FAMILY MEDICINE

## 2022-02-15 PROCEDURE — G8484 FLU IMMUNIZE NO ADMIN: HCPCS | Performed by: FAMILY MEDICINE

## 2022-02-15 PROCEDURE — 99214 OFFICE O/P EST MOD 30 MIN: CPT | Performed by: FAMILY MEDICINE

## 2022-02-15 PROCEDURE — 4040F PNEUMOC VAC/ADMIN/RCVD: CPT | Performed by: FAMILY MEDICINE

## 2022-02-15 PROCEDURE — 1123F ACP DISCUSS/DSCN MKR DOCD: CPT | Performed by: FAMILY MEDICINE

## 2022-02-15 PROCEDURE — 1036F TOBACCO NON-USER: CPT | Performed by: FAMILY MEDICINE

## 2022-02-15 PROCEDURE — G8417 CALC BMI ABV UP PARAM F/U: HCPCS | Performed by: FAMILY MEDICINE

## 2022-02-15 PROCEDURE — 3023F SPIROM DOC REV: CPT | Performed by: FAMILY MEDICINE

## 2022-02-15 PROCEDURE — G8427 DOCREV CUR MEDS BY ELIG CLIN: HCPCS | Performed by: FAMILY MEDICINE

## 2022-02-15 RX ORDER — CARVEDILOL 12.5 MG/1
TABLET ORAL
Qty: 180 TABLET | Refills: 1 | Status: SHIPPED | OUTPATIENT
Start: 2022-02-15 | End: 2022-08-22 | Stop reason: SDUPTHER

## 2022-02-15 RX ORDER — CLOPIDOGREL BISULFATE 75 MG/1
TABLET ORAL
Qty: 45 TABLET | Refills: 1 | Status: SHIPPED | OUTPATIENT
Start: 2022-02-15 | End: 2022-08-22 | Stop reason: SDUPTHER

## 2022-02-15 RX ORDER — LISINOPRIL 20 MG/1
TABLET ORAL
Qty: 90 TABLET | Refills: 1 | Status: SHIPPED | OUTPATIENT
Start: 2022-02-15 | End: 2022-08-22 | Stop reason: SDUPTHER

## 2022-02-15 RX ORDER — AMLODIPINE BESYLATE 5 MG/1
TABLET ORAL
Qty: 90 TABLET | Refills: 1 | Status: SHIPPED | OUTPATIENT
Start: 2022-02-15 | End: 2022-08-22 | Stop reason: SDUPTHER

## 2022-02-15 SDOH — ECONOMIC STABILITY: FOOD INSECURITY: WITHIN THE PAST 12 MONTHS, YOU WORRIED THAT YOUR FOOD WOULD RUN OUT BEFORE YOU GOT MONEY TO BUY MORE.: NEVER TRUE

## 2022-02-15 SDOH — ECONOMIC STABILITY: FOOD INSECURITY: WITHIN THE PAST 12 MONTHS, THE FOOD YOU BOUGHT JUST DIDN'T LAST AND YOU DIDN'T HAVE MONEY TO GET MORE.: NEVER TRUE

## 2022-02-15 ASSESSMENT — ENCOUNTER SYMPTOMS
WHEEZING: 0
SHORTNESS OF BREATH: 0
TROUBLE SWALLOWING: 0
BLOOD IN STOOL: 0
ABDOMINAL PAIN: 0
NAUSEA: 0
CHEST TIGHTNESS: 0
EYE PAIN: 0
VOMITING: 0
DIARRHEA: 0

## 2022-02-15 ASSESSMENT — SOCIAL DETERMINANTS OF HEALTH (SDOH): HOW HARD IS IT FOR YOU TO PAY FOR THE VERY BASICS LIKE FOOD, HOUSING, MEDICAL CARE, AND HEATING?: NOT HARD AT ALL

## 2022-02-15 NOTE — PROGRESS NOTES
2/15/2022    [facility]    Chief Complaint   Patient presents with    6 Month Follow-Up    Other     no c/o's       HPI  History was obtained from the patient. Mike Boyce is a 67 y.o. male who presents today with follow-up on hypertension, coronary disease, COPD, GERD, and history of mild hyperglycemia. Please note he denies acute change- no increased chest pain and no increased shortness of breath. He remains on same medicines and follows up closely with his cardiologist.  GERD symptoms are controlled. He admits he would like to be a more physically active but is limited by the winter weather. He has had his COVID shots x2 and COVID booster- patient had colonoscopy in September 2018 with a 5-year recheck recommended. Meds are tolerated and blood pressure looks good- mood remains stable. On review does need lab work needs to work on increasing his exercise. REVIEW OF SYMPTOMS    Review of Systems   Constitutional: Negative for activity change and fatigue. HENT: Negative for congestion, hearing loss, mouth sores and trouble swallowing. Eyes: Negative for pain and visual disturbance. Respiratory: Negative for chest tightness, shortness of breath and wheezing. Patient with known COPD. Currently doing well on current regimen. No increase shortness of breath or wheezing. Cardiovascular: Negative for chest pain and palpitations. Patient with known coronary disease previous non-STEMI and bypass grafting. Follows close with cardiology has not had any chest pain. Gastrointestinal: Negative for abdominal pain, blood in stool, diarrhea, nausea and vomiting. Endocrine: Negative for polydipsia and polyuria. Genitourinary: Negative for dysuria, frequency and urgency. Musculoskeletal: Negative for arthralgias, gait problem and neck stiffness. Skin: Negative for rash. Allergic/Immunologic: Negative for environmental allergies.    Neurological: Negative for dizziness, seizures, PATENT, 3 V RUN OFF    Hyperlipidemia     Lumbosacral spondylosis     Primary erectile dysfunction     S/P CABG x 4     vein graft diagonal coronary artery,vein graft distal RCA,vein graft obtuse marginal coronary artery and left internal mammary artery to the left anterior descending    Shortness of breath on exertion     Teeth missing     Upper And Lower    Wears dentures     Full Upper    Wears glasses        FAMILY HISTORY  Family History   Adopted: Yes   Problem Relation Age of Onset    Heart Disease Mother     Hypertension Mother     Diabetes Mother        SOCIAL HISTORY  Social History     Socioeconomic History    Marital status:      Spouse name: Not on file    Number of children: Not on file    Years of education: Not on file    Highest education level: Not on file   Occupational History    Not on file   Tobacco Use    Smoking status: Former Smoker     Packs/day: 1.00     Years: 55.00     Pack years: 55.00     Types: Cigarettes, Pipe, Cigars     Start date:      Quit date:      Years since quittin.1    Smokeless tobacco: Former User     Types: Chew     Quit date:    Vaping Use    Vaping Use: Never used   Substance and Sexual Activity    Alcohol use: No     Comment: Caffeine: 3-4 cups of coffee daily     Drug use: No    Sexual activity: Not Currently   Other Topics Concern    Not on file   Social History Narrative    Not on file     Social Determinants of Health     Financial Resource Strain: Low Risk     Difficulty of Paying Living Expenses: Not hard at all   Food Insecurity: No Food Insecurity    Worried About Running Out of Food in the Last Year: Never true    Joni of Food in the Last Year: Never true   Transportation Needs:     Lack of Transportation (Medical): Not on file    Lack of Transportation (Non-Medical):  Not on file   Physical Activity:     Days of Exercise per Week: Not on file    Minutes of Exercise per Session: Not on file   Stress:  Feeling of Stress : Not on file   Social Connections:     Frequency of Communication with Friends and Family: Not on file    Frequency of Social Gatherings with Friends and Family: Not on file    Attends Hindu Services: Not on file    Active Member of Clubs or Organizations: Not on file    Attends Club or Organization Meetings: Not on file    Marital Status: Not on file   Intimate Partner Violence:     Fear of Current or Ex-Partner: Not on file    Emotionally Abused: Not on file    Physically Abused: Not on file    Sexually Abused: Not on file   Housing Stability:     Unable to Pay for Housing in the Last Year: Not on file    Number of [place] in the Last Year: Not on file    Unstable Housing in the Last Year: Not on file        SURGICAL HISTORY  Past Surgical History:   Procedure Laterality Date   [address]  2014    CABG (4 Bypasses)    COLONOSCOPY  Last Done In 2019    Polyps Removed In Past    DENTAL SURGERY      Teeth Extracted In Past    FEMORAL BYPASS Right 3/25/2019    RIGHT FEMORAL POPLITEAL BYPASS performed by Laura Calvillo MD at [address] Left 02/12/2019    \"Stent\"    SKIN CANCER EXCISION  1992    Basal Cell Skin Cancer Excised Nose    TONSILLECTOMY AND ADENOIDECTOMY  1950's                 CURRENT MEDICATIONS  Current Outpatient Medications   Medication Sig Dispense Refill    amLODIPine (NORVASC) 5 MG tablet TAKE 1 TABLET BY MOUTH ONCE DAILY IN THE MORNING 90 tablet 1    carvedilol (COREG) 12.5 MG tablet TAKE 1 TABLET BY MOUTH TWICE DAILY WITH MEALS 180 tablet 1    clopidogrel (PLAVIX) 75 MG tablet Take 1/2 (one-half) tablet by mouth once daily 45 tablet 1    lisinopril (PRINIVIL;ZESTRIL) 20 MG tablet Take 1 tablet by mouth once daily 90 tablet 1    tiotropium-olodaterol (STIOLTO RESPIMAT) 2.5-2.5 MCG/ACT AERS Inhale 2 puffs into the lungs daily 3 each 1    Evolocumab (REPATHA SURECLICK) 773 MG/ML SOAJ Inject 140 mg into the skin every 14 days 2 pen 3    albuterol sulfate  (90 Base) MCG/ACT inhaler Inhale 2 puffs into the lungs 4 times daily as needed for Wheezing 3 each 1    sildenafil (VIAGRA) 100 MG tablet Take 1 tablet by mouth as needed for Erectile Dysfunction 30 tablet 1    aspirin 81 MG tablet Take 81 mg by mouth nightly Over The Counter       No current facility-administered medications for this visit. ALLERGIES  Allergies   Allergen Reactions    Statins      \"Paralyzes Muscles And Causes Muscle Weakness\"    Lansoprazole      dizziness    Lipitor [Atorvastatin Calcium]      Muscle weakness    Spiriva Handihaler [Tiotropium Bromide Monohydrate]      Hoarse voice       PHYSICAL EXAM    /82   Pulse 60   Ht 5' 11\" (1.803 m)   Wt 219 lb (99.3 kg)   SpO2 95%   BMI 30.54 kg/m²     Physical Exam  Vitals and nursing note reviewed. Constitutional:       General: He is not in acute distress. Appearance: He is well-developed. He is not toxic-appearing. HENT:      Head: Normocephalic and atraumatic. Nose: Nose normal.      Mouth/Throat:      Mouth: Mucous membranes are moist.      Pharynx: Oropharynx is clear. Eyes:      Pupils: Pupils are equal, round, and reactive to light. Cardiovascular:      Rate and Rhythm: Normal rate and regular rhythm. Pulses: Normal pulses. Heart sounds: Normal heart sounds. Pulmonary:      Effort: Pulmonary effort is normal. No respiratory distress. Breath sounds: Normal breath sounds. No wheezing, rhonchi or rales. Abdominal:      Palpations: Abdomen is soft. Musculoskeletal:         General: No swelling or deformity. Normal range of motion. Cervical back: Normal range of motion and neck supple. No rigidity. Lymphadenopathy:      Cervical: No cervical adenopathy. Skin:     General: Skin is warm and dry. Coloration: Skin is not jaundiced. Findings: No bruising. Neurological:      General: No focal deficit present.       Mental Status: He is alert and oriented to person, place, and time. Mental status is at baseline. Motor: No weakness. Psychiatric:         Mood and Affect: Mood normal.         Thought Content: Thought content normal.         ASSESSMENT & PLAN     Diagnosis Orders   1. Essential hypertension  amLODIPine (NORVASC) 5 MG tablet    carvedilol (COREG) 12.5 MG tablet    lisinopril (PRINIVIL;ZESTRIL) 20 MG tablet    CBC   2. Pulmonary emphysema, unspecified emphysema type ([address]     3. Hyperglycemia     4. Atherosclerotic heart disease of native coronary artery with other forms of angina pectoris [facility])     Questions answered encouragement provided. Work on increasing exercise follow-up closely with subspecialists we will continue on same regimen. Check CBC, CMP, lipid panel, and an A1c. He is to follow-up for all these test results- call with other issues or problems    Return in about 4 months (around 6/15/2022).          Electronically signed by Mikel Ventura MD on 2/15/2022

## 2022-04-20 ENCOUNTER — TELEPHONE (OUTPATIENT)
Dept: CARDIOLOGY CLINIC | Age: 72
End: 2022-04-20

## 2022-04-23 DIAGNOSIS — I73.9 PVD (PERIPHERAL VASCULAR DISEASE) (HCC): ICD-10-CM

## 2022-04-23 DIAGNOSIS — Z95.1 S/P CABG X 4: ICD-10-CM

## 2022-04-23 DIAGNOSIS — I25.118 ATHEROSCLEROTIC HEART DISEASE OF NATIVE CORONARY ARTERY WITH OTHER FORMS OF ANGINA PECTORIS (HCC): ICD-10-CM

## 2022-04-23 DIAGNOSIS — I10 ESSENTIAL HYPERTENSION: ICD-10-CM

## 2022-04-26 DIAGNOSIS — Z95.1 S/P CABG X 4: ICD-10-CM

## 2022-04-26 DIAGNOSIS — I10 ESSENTIAL HYPERTENSION: ICD-10-CM

## 2022-04-26 DIAGNOSIS — I25.118 ATHEROSCLEROTIC HEART DISEASE OF NATIVE CORONARY ARTERY WITH OTHER FORMS OF ANGINA PECTORIS (HCC): ICD-10-CM

## 2022-04-26 DIAGNOSIS — I73.9 PVD (PERIPHERAL VASCULAR DISEASE) (HCC): ICD-10-CM

## 2022-04-26 RX ORDER — EVOLOCUMAB 140 MG/ML
140 INJECTION, SOLUTION SUBCUTANEOUS
Qty: 2 PEN | Refills: 5 | Status: SHIPPED | OUTPATIENT
Start: 2022-04-26 | End: 2022-10-20

## 2022-04-26 RX ORDER — EVOLOCUMAB 140 MG/ML
140 INJECTION, SOLUTION SUBCUTANEOUS
Qty: 2 PEN | Refills: 3 | OUTPATIENT
Start: 2022-04-26

## 2022-05-23 ENCOUNTER — OFFICE VISIT (OUTPATIENT)
Dept: CARDIOLOGY CLINIC | Age: 72
End: 2022-05-23
Payer: MEDICARE

## 2022-05-23 VITALS
WEIGHT: 226.2 LBS | SYSTOLIC BLOOD PRESSURE: 130 MMHG | HEIGHT: 71 IN | OXYGEN SATURATION: 97 % | HEART RATE: 84 BPM | BODY MASS INDEX: 31.67 KG/M2 | DIASTOLIC BLOOD PRESSURE: 80 MMHG | RESPIRATION RATE: 16 BRPM

## 2022-05-23 DIAGNOSIS — I25.118 ATHEROSCLEROTIC HEART DISEASE OF NATIVE CORONARY ARTERY WITH OTHER FORMS OF ANGINA PECTORIS (HCC): ICD-10-CM

## 2022-05-23 DIAGNOSIS — I73.9 PERIPHERAL VASCULAR DISEASE, UNSPECIFIED (HCC): Primary | ICD-10-CM

## 2022-05-23 DIAGNOSIS — I10 PRIMARY HYPERTENSION: ICD-10-CM

## 2022-05-23 DIAGNOSIS — E78.5 HYPERLIPIDEMIA, UNSPECIFIED HYPERLIPIDEMIA TYPE: ICD-10-CM

## 2022-05-23 PROCEDURE — G8417 CALC BMI ABV UP PARAM F/U: HCPCS | Performed by: NURSE PRACTITIONER

## 2022-05-23 PROCEDURE — 1036F TOBACCO NON-USER: CPT | Performed by: NURSE PRACTITIONER

## 2022-05-23 PROCEDURE — 99214 OFFICE O/P EST MOD 30 MIN: CPT | Performed by: NURSE PRACTITIONER

## 2022-05-23 PROCEDURE — G8427 DOCREV CUR MEDS BY ELIG CLIN: HCPCS | Performed by: NURSE PRACTITIONER

## 2022-05-23 PROCEDURE — 1123F ACP DISCUSS/DSCN MKR DOCD: CPT | Performed by: NURSE PRACTITIONER

## 2022-05-23 PROCEDURE — 3017F COLORECTAL CA SCREEN DOC REV: CPT | Performed by: NURSE PRACTITIONER

## 2022-05-23 ASSESSMENT — ENCOUNTER SYMPTOMS
ORTHOPNEA: 0
SHORTNESS OF BREATH: 0

## 2022-05-23 NOTE — PROGRESS NOTES
5/23/2022  Primary cardiologist: Dr. Cassandra Robles:   Sarah Lopes  is an established 67 y.o.  male here for a 6 month follow up on peripheral vascular disease, coronary artery disease, hypertension,      SUBJECTIVE/OBJECTIVE:  Sarah Lopes is a 67 y.o. male with a history of coronary artery disease, CABG x4 hypertension, hyperlipidemia, peripheral vascular disease, PTA of L SFA and right FP bypass COVID,and COPD      HPI :   Sarah Lopes reports he is feeling well. Denies episodes of chest pain, shortness of breath or palpitations. Denies claudication. Reports compliance with medications    Review of Systems   Constitutional: Negative for diaphoresis and malaise/fatigue. Cardiovascular: Negative for chest pain, claudication, dyspnea on exertion, irregular heartbeat, leg swelling, near-syncope, orthopnea, palpitations and paroxysmal nocturnal dyspnea. Respiratory: Negative for shortness of breath. Neurological: Negative for dizziness and light-headedness. Vitals:    05/23/22 0914   BP: 130/80   Site: Left Upper Arm   Position: Sitting   Cuff Size: Medium Adult   Pulse: 84   Resp: 16   SpO2: 97%   Weight: 226 lb 3.2 oz (102.6 kg)   Height: 5' 11\" (1.803 m)     Patient-Reported Vitals 9/1/2021   Patient-Reported Systolic 455   Patient-Reported Diastolic 90   Patient-Reported Pulse 76   Patient-Reported SpO2 93     Wt Readings from Last 3 Encounters:   05/23/22 226 lb 3.2 oz (102.6 kg)   02/15/22 219 lb (99.3 kg)   11/05/21 221 lb (100.2 kg)     Body mass index is 31.55 kg/m². Physical Exam  HENT:      Head: Normocephalic and atraumatic. Neck:      Vascular: No carotid bruit. Cardiovascular:      Rate and Rhythm: Normal rate and regular rhythm. Pulses: Normal pulses. Heart sounds: Normal heart sounds. Pulmonary:      Effort: Pulmonary effort is normal.      Breath sounds: Normal breath sounds. Abdominal:      General: There is no distension. Tenderness: There is no abdominal tenderness. Musculoskeletal:      Right lower leg: No edema. Left lower leg: No edema. Skin:     General: Skin is warm and dry. Capillary Refill: Capillary refill takes less than 2 seconds. Neurological:      General: No focal deficit present. Mental Status: He is alert. Current Outpatient Medications   Medication Sig Dispense Refill    Evolocumab (REPATHA SURECLICK) 104 MG/ML SOAJ Inject 140 mg into the skin every 14 days 2 pen 5    amLODIPine (NORVASC) 5 MG tablet TAKE 1 TABLET BY MOUTH ONCE DAILY IN THE MORNING 90 tablet 1    carvedilol (COREG) 12.5 MG tablet TAKE 1 TABLET BY MOUTH TWICE DAILY WITH MEALS 180 tablet 1    clopidogrel (PLAVIX) 75 MG tablet Take 1/2 (one-half) tablet by mouth once daily 45 tablet 1    lisinopril (PRINIVIL;ZESTRIL) 20 MG tablet Take 1 tablet by mouth once daily 90 tablet 1    tiotropium-olodaterol (STIOLTO RESPIMAT) 2.5-2.5 MCG/ACT AERS Inhale 2 puffs into the lungs daily 3 each 1    albuterol sulfate  (90 Base) MCG/ACT inhaler Inhale 2 puffs into the lungs 4 times daily as needed for Wheezing 3 each 1    sildenafil (VIAGRA) 100 MG tablet Take 1 tablet by mouth as needed for Erectile Dysfunction 30 tablet 1    aspirin 81 MG tablet Take 81 mg by mouth nightly Over The Counter       No current facility-administered medications for this visit. All pertinent data reviewed and discussed with patient       ASSESSMENT/PLAN:    1. Peripheral vascular disease, unspecified ([address]  Denies claudication, nonhealing ulcers or discolorations to lower legs. Encouraged to participate in a walking program: Continue with aspirin and Plavix    2. Atherosclerotic heart disease of native coronary artery with other forms of angina pectoris ([address]  Clinically stable. He denies symptoms. Last stress test September 2019 reviewed with patient. Normal perfusion with normal EF.   We will continue with aggressive risk factor modification including diet and exercise. Continue present medical regimen including aspirin, carvedilol and Plavix: Tolerating medications without side effect    3. Hyperlipidemia, unspecified hyperlipidemia type  No recent lipids available: States he is to have labs with his primary care physician. Goal for HDL 40 or greater LDL 70 or less than. Unfortunately is not able to tolerate statins : Recommend to continue with Repatha    4. Primary hypertension  Blood pressure well controlled and stable with use of lisinopril and amlodipine: Recommend to continue present dose        Medications reviewed and confirmed with patient         Tests ordered:  none  Follow-up  9 months      Signed:  CHRISTOPHER Encarnacion CNP, 5/23/2022, 9:24 AM    An electronic signature was used to authenticate this note. Please note this report has been partially produced using speech recognition software and may contain errors related to that system including errors in grammar, punctuation, and spelling, as well as words and phrases that may be inappropriate. If there are any questions or concerns please feel free to contact the dictating provider for clarification.

## 2022-08-18 ENCOUNTER — TELEPHONE (OUTPATIENT)
Dept: FAMILY MEDICINE CLINIC | Age: 72
End: 2022-08-18

## 2022-08-22 ENCOUNTER — OFFICE VISIT (OUTPATIENT)
Dept: FAMILY MEDICINE CLINIC | Age: 72
End: 2022-08-22
Payer: MEDICARE

## 2022-08-22 VITALS
DIASTOLIC BLOOD PRESSURE: 68 MMHG | HEART RATE: 66 BPM | SYSTOLIC BLOOD PRESSURE: 118 MMHG | HEIGHT: 71 IN | BODY MASS INDEX: 31.36 KG/M2 | OXYGEN SATURATION: 97 % | WEIGHT: 224 LBS

## 2022-08-22 DIAGNOSIS — Z95.1 S/P CABG X 4: ICD-10-CM

## 2022-08-22 DIAGNOSIS — N18.30 STAGE 3 CHRONIC KIDNEY DISEASE, UNSPECIFIED WHETHER STAGE 3A OR 3B CKD (HCC): ICD-10-CM

## 2022-08-22 DIAGNOSIS — I73.9 PVD (PERIPHERAL VASCULAR DISEASE) (HCC): ICD-10-CM

## 2022-08-22 DIAGNOSIS — J43.9 PULMONARY EMPHYSEMA, UNSPECIFIED EMPHYSEMA TYPE (HCC): Primary | ICD-10-CM

## 2022-08-22 DIAGNOSIS — I10 ESSENTIAL HYPERTENSION: ICD-10-CM

## 2022-08-22 DIAGNOSIS — I10 PRIMARY HYPERTENSION: ICD-10-CM

## 2022-08-22 DIAGNOSIS — I25.118 ATHEROSCLEROTIC HEART DISEASE OF NATIVE CORONARY ARTERY WITH OTHER FORMS OF ANGINA PECTORIS (HCC): ICD-10-CM

## 2022-08-22 DIAGNOSIS — R73.9 HYPERGLYCEMIA: ICD-10-CM

## 2022-08-22 LAB
A/G RATIO: 1.3 (ref 1.1–2.2)
ALBUMIN SERPL-MCNC: 3.9 G/DL (ref 3.4–5)
ALP BLD-CCNC: 76 U/L (ref 40–129)
ALT SERPL-CCNC: 12 U/L (ref 10–40)
ANION GAP SERPL CALCULATED.3IONS-SCNC: 13 MMOL/L (ref 3–16)
AST SERPL-CCNC: 20 U/L (ref 15–37)
BILIRUB SERPL-MCNC: 0.4 MG/DL (ref 0–1)
BUN BLDV-MCNC: 18 MG/DL (ref 7–20)
CALCIUM SERPL-MCNC: 9.9 MG/DL (ref 8.3–10.6)
CHLORIDE BLD-SCNC: 103 MMOL/L (ref 99–110)
CHOLESTEROL, TOTAL: 155 MG/DL (ref 0–199)
CO2: 24 MMOL/L (ref 21–32)
CREAT SERPL-MCNC: 1.1 MG/DL (ref 0.8–1.3)
GFR AFRICAN AMERICAN: >60
GFR NON-AFRICAN AMERICAN: >60
GLUCOSE BLD-MCNC: 84 MG/DL (ref 70–99)
HDLC SERPL-MCNC: 45 MG/DL (ref 40–60)
LDL CHOLESTEROL CALCULATED: 80 MG/DL
POTASSIUM SERPL-SCNC: 4.8 MMOL/L (ref 3.5–5.1)
SODIUM BLD-SCNC: 140 MMOL/L (ref 136–145)
TOTAL PROTEIN: 7 G/DL (ref 6.4–8.2)
TRIGL SERPL-MCNC: 148 MG/DL (ref 0–150)
VLDLC SERPL CALC-MCNC: 30 MG/DL

## 2022-08-22 PROCEDURE — 1036F TOBACCO NON-USER: CPT | Performed by: FAMILY MEDICINE

## 2022-08-22 PROCEDURE — 3023F SPIROM DOC REV: CPT | Performed by: FAMILY MEDICINE

## 2022-08-22 PROCEDURE — 99214 OFFICE O/P EST MOD 30 MIN: CPT | Performed by: FAMILY MEDICINE

## 2022-08-22 PROCEDURE — 3017F COLORECTAL CA SCREEN DOC REV: CPT | Performed by: FAMILY MEDICINE

## 2022-08-22 PROCEDURE — G8427 DOCREV CUR MEDS BY ELIG CLIN: HCPCS | Performed by: FAMILY MEDICINE

## 2022-08-22 PROCEDURE — 1123F ACP DISCUSS/DSCN MKR DOCD: CPT | Performed by: FAMILY MEDICINE

## 2022-08-22 PROCEDURE — G8417 CALC BMI ABV UP PARAM F/U: HCPCS | Performed by: FAMILY MEDICINE

## 2022-08-22 RX ORDER — AMLODIPINE BESYLATE 5 MG/1
TABLET ORAL
Qty: 90 TABLET | Refills: 1 | Status: SHIPPED | OUTPATIENT
Start: 2022-08-22

## 2022-08-22 RX ORDER — ALBUTEROL SULFATE 90 UG/1
2 AEROSOL, METERED RESPIRATORY (INHALATION) 4 TIMES DAILY PRN
Qty: 3 EACH | Refills: 1 | Status: SHIPPED | OUTPATIENT
Start: 2022-08-22

## 2022-08-22 RX ORDER — CLOPIDOGREL BISULFATE 75 MG/1
TABLET ORAL
Qty: 45 TABLET | Refills: 1 | Status: SHIPPED | OUTPATIENT
Start: 2022-08-22

## 2022-08-22 RX ORDER — LISINOPRIL 20 MG/1
TABLET ORAL
Qty: 90 TABLET | Refills: 1 | Status: SHIPPED | OUTPATIENT
Start: 2022-08-22

## 2022-08-22 RX ORDER — SILDENAFIL 100 MG/1
100 TABLET, FILM COATED ORAL PRN
Qty: 30 TABLET | Refills: 1 | Status: SHIPPED | OUTPATIENT
Start: 2022-08-22

## 2022-08-22 RX ORDER — CARVEDILOL 12.5 MG/1
TABLET ORAL
Qty: 180 TABLET | Refills: 1 | Status: SHIPPED | OUTPATIENT
Start: 2022-08-22

## 2022-08-22 ASSESSMENT — ENCOUNTER SYMPTOMS
DIARRHEA: 0
TROUBLE SWALLOWING: 0
BLOOD IN STOOL: 0
ABDOMINAL PAIN: 0
SHORTNESS OF BREATH: 1
WHEEZING: 1
CHEST TIGHTNESS: 0
VOMITING: 0
BACK PAIN: 1
EYE PAIN: 0
NAUSEA: 0

## 2022-08-22 ASSESSMENT — PATIENT HEALTH QUESTIONNAIRE - PHQ9
SUM OF ALL RESPONSES TO PHQ9 QUESTIONS 1 & 2: 0
SUM OF ALL RESPONSES TO PHQ QUESTIONS 1-9: 0
SUM OF ALL RESPONSES TO PHQ QUESTIONS 1-9: 0
2. FEELING DOWN, DEPRESSED OR HOPELESS: 0
SUM OF ALL RESPONSES TO PHQ QUESTIONS 1-9: 0
1. LITTLE INTEREST OR PLEASURE IN DOING THINGS: 0
SUM OF ALL RESPONSES TO PHQ QUESTIONS 1-9: 0

## 2022-08-22 NOTE — PROGRESS NOTES
allergies. Neurological:  Negative for dizziness, seizures, speech difficulty and weakness. Hematological:  Does not bruise/bleed easily. Psychiatric/Behavioral:  Negative for agitation, confusion, hallucinations, self-injury and suicidal ideas. The patient is not nervous/anxious.       PAST MEDICAL HISTORY  Past Medical History:   Diagnosis Date    Acid reflux     Basal Cell Skin Cancer Excised Nose 1992    Basal Cell Skin Cancer Excised Nose    CAD (coronary artery disease)     Sees Dr. Kristin Sahu    COPD (chronic obstructive pulmonary disease) ([facility]     Essential hypertension     H/O cardiovascular stress test 5/29/2014    cardiolite-normal, no ischemia, EF 60%    H/O cardiovascular stress test 1/18/2015    cardiolite-normal, EF51%    H/O cardiovascular stress test 02/12/2018    EF60% normal study    H/O Doppler lower arterial ultrasound 04/22/2019    No significant occlusive arterial disease, Patent right femoral popliteal bypass graft with biphasic waveforms, bilateral abis show mild peripheral arterial disease    H/O Doppler ultrasound 6/1/2016    Lower Ext Arterial- normal study, mild stenosis in RLE    H/O echocardiogram 5/28/2014    EF35-45% mild MR, mild AV calcification    H/O echocardiogram 09/25/2019    EF 02-22%, grade I diastolic dysfunction, No significant valvular disease, no pericardial effusion     Heart attack ([facility] 2014    History of cardiac cath 02/12/2019    PATENT RENAL ARTERIES DISTAL ABD AORTA ATHEROSCLEROTIC, ANEYRYSMAL, RCIA PARENT, REIA  PATENT, RT SFA OCCLUDED, DISTALLY RECONSTITUTES, 3 V RUNOFF,  LT MARIA ISABEL PATENT, CHRIS 70% TO 0%,  LT SFA 80% TO 0% WITH ATHRECTOMY TO PTA, LT POP PATENT,  3 V run off                        History of nuclear stress test 09/25/2019    EF 51%, Normal    History of percutaneous left heart catheterization 5/3/2014    RCA 99% distal stenosis EF55%    History of Peripheral Angiogram 03/07/2019    RCIA PATENT, REIA CALCIFIED 70% STENOSIS, RSFA OCCLUDED NO STUMP, PFA  PATENT FILL DISTAL SFA WITH COLLATERLS, RT POP PATENT, 3 V RUN OFF    Hyperlipidemia     Lumbosacral spondylosis     Primary erectile dysfunction     S/P CABG x 4     vein graft diagonal coronary artery,vein graft distal RCA,vein graft obtuse marginal coronary artery and left internal mammary artery to the left anterior descending    Shortness of breath on exertion     Teeth missing     Upper And Lower    Wears dentures     Full Upper    Wears glasses        FAMILY HISTORY  Family History   Adopted: Yes   Problem Relation Age of Onset    Heart Disease Mother     Hypertension Mother     Diabetes Mother        SOCIAL HISTORY  Social History     Socioeconomic History    Marital status:    Tobacco Use    Smoking status: Former     Packs/day: 1.00     Years: 55.00     Pack years: 55.00     Types: Cigarettes, Pipe, Cigars     Start date:      Quit date:      Years since quittin.6    Smokeless tobacco: Former     Types: Chew     Quit date:    Vaping Use    Vaping Use: Never used   Substance and Sexual Activity    Alcohol use: No     Comment: Caffeine: 3-4 cups of coffee daily     Drug use: No    Sexual activity: Not Currently     Social Determinants of Health     Financial Resource Strain: Low Risk     Difficulty of Paying Living Expenses: Not hard at all   Food Insecurity: No Food Insecurity    Worried About Running Out of Food in the Last Year: Never true    Ran Out of Food in the Last Year: Never true        SURGICAL HISTORY  Past Surgical History:   Procedure Laterality Date    CARDIAC SURGERY      CABG (4 Bypasses)    COLONOSCOPY  Last Done In 2019    Polyps Removed In Past    DENTAL SURGERY      Teeth Extracted In Past    FEMORAL BYPASS Right 3/25/2019    RIGHT FEMORAL POPLITEAL BYPASS performed by Javier Darby MD at [address] [facility] Left 2019    \"Stent\"    SKIN CANCER EXCISION      Basal Cell Skin Cancer Excised Nose    TONSILLECTOMY AND ADENOIDECTOMY  's CURRENT MEDICATIONS  Current Outpatient Medications   Medication Sig Dispense Refill    albuterol sulfate HFA (PROVENTIL;VENTOLIN;PROAIR) 108 (90 Base) MCG/ACT inhaler Inhale 2 puffs into the lungs 4 times daily as needed for Wheezing 3 each 1    amLODIPine (NORVASC) 5 MG tablet TAKE 1 TABLET BY MOUTH ONCE DAILY IN THE MORNING 90 tablet 1    carvedilol (COREG) 12.5 MG tablet TAKE 1 TABLET BY MOUTH TWICE DAILY WITH MEALS 180 tablet 1    clopidogrel (PLAVIX) 75 MG tablet Take 1/2 (one-half) tablet by mouth once daily 45 tablet 1    sildenafil (VIAGRA) 100 MG tablet Take 1 tablet by mouth as needed for Erectile Dysfunction 30 tablet 1    lisinopril (PRINIVIL;ZESTRIL) 20 MG tablet Take 1 tablet by mouth once daily 90 tablet 1    tiotropium-olodaterol (STIOLTO RESPIMAT) 2.5-2.5 MCG/ACT AERS Inhale 2 puffs into the lungs daily 3 each 1    Evolocumab (REPATHA SURECLICK) 316 MG/ML SOAJ Inject 140 mg into the skin every 14 days 2 pen 5    aspirin 81 MG tablet Take 81 mg by mouth nightly Over The Counter       No current facility-administered medications for this visit. ALLERGIES  Allergies   Allergen Reactions    Statins      \"Paralyzes Muscles And Causes Muscle Weakness\"    Lansoprazole      dizziness    Lipitor [Atorvastatin Calcium]      Muscle weakness    Spiriva Handihaler [Tiotropium Bromide Monohydrate]      Hoarse voice       PHYSICAL EXAM    /68 (Site: Left Upper Arm, Position: Sitting, Cuff Size: Medium Adult)   Pulse 66   Ht 5' 11\" (1.803 m)   Wt 224 lb (101.6 kg)   SpO2 97%   BMI 31.24 kg/m²     Physical Exam  Vitals and nursing note reviewed. Constitutional:       General: He is not in acute distress. Appearance: Normal appearance. He is well-developed. He is not ill-appearing, toxic-appearing or diaphoretic. HENT:      Head: Normocephalic and atraumatic. Nose: Nose normal.      Mouth/Throat:      Mouth: Mucous membranes are moist.      Pharynx: Oropharynx is clear. Eyes:      Pupils: Pupils are equal, round, and reactive to light. Cardiovascular:      Rate and Rhythm: Normal rate and regular rhythm. Heart sounds: Normal heart sounds. No gallop. Pulmonary:      Effort: Pulmonary effort is normal. No respiratory distress. Breath sounds: Normal breath sounds. No wheezing, rhonchi or rales. Abdominal:      Palpations: Abdomen is soft. Musculoskeletal:         General: No swelling or deformity. Normal range of motion. Cervical back: Normal range of motion and neck supple. No rigidity. Lymphadenopathy:      Cervical: No cervical adenopathy. Skin:     General: Skin is warm and dry. Coloration: Skin is not jaundiced. Findings: No bruising. Neurological:      General: No focal deficit present. Mental Status: He is alert and oriented to person, place, and time. Mental status is at baseline. Cranial Nerves: No cranial nerve deficit. Motor: No weakness. Psychiatric:         Mood and Affect: Mood normal.         Thought Content: Thought content normal.       ASSESSMENT & PLAN     Diagnosis Orders   1. Pulmonary emphysema, unspecified emphysema type ([facility]        2. Essential hypertension  amLODIPine (NORVASC) 5 MG tablet    carvedilol (COREG) 12.5 MG tablet    lisinopril (PRINIVIL;ZESTRIL) 20 MG tablet    Comprehensive Metabolic Panel    CBC      3. Atherosclerotic heart disease of native coronary artery with other forms of angina pectoris ([address]  LIPID PANEL      4. S/P CABG x 4        5. PVD (peripheral vascular disease) (HCC)        6. Stage 3 chronic kidney disease, unspecified whether stage 3a or 3b CKD ([address]        7. Primary hypertension        8. Hyperglycemia  Hemoglobin A1C      Med list reviewed and refills provided. We will check A1c, lipid panel, CMP, and CBC and have patient follow-up for results. Continue to work on healthy lifestyle call with changes or problems.   Work on mild weight loss keep appointments with subspecialist.  Patient to get COVID booster ASAP and /or get new COVID vaccine when available later this fall. Return in about 6 months (around 2/22/2023).          Electronically signed by Alis Solis MD on 8/22/2022

## 2022-10-20 DIAGNOSIS — I73.9 PVD (PERIPHERAL VASCULAR DISEASE) (HCC): ICD-10-CM

## 2022-10-20 DIAGNOSIS — I10 ESSENTIAL HYPERTENSION: ICD-10-CM

## 2022-10-20 DIAGNOSIS — I25.118 ATHEROSCLEROTIC HEART DISEASE OF NATIVE CORONARY ARTERY WITH OTHER FORMS OF ANGINA PECTORIS (HCC): ICD-10-CM

## 2022-10-20 DIAGNOSIS — Z95.1 S/P CABG X 4: ICD-10-CM

## 2022-10-24 RX ORDER — EVOLOCUMAB 140 MG/ML
140 INJECTION, SOLUTION SUBCUTANEOUS
Qty: 2 ML | Refills: 0 | Status: SHIPPED | OUTPATIENT
Start: 2022-10-24 | End: 2022-11-11 | Stop reason: SDUPTHER

## 2022-11-11 ENCOUNTER — OFFICE VISIT (OUTPATIENT)
Dept: CARDIOLOGY CLINIC | Age: 72
End: 2022-11-11
Payer: MEDICARE

## 2022-11-11 VITALS
HEART RATE: 63 BPM | HEIGHT: 71 IN | WEIGHT: 223.8 LBS | DIASTOLIC BLOOD PRESSURE: 70 MMHG | BODY MASS INDEX: 31.33 KG/M2 | SYSTOLIC BLOOD PRESSURE: 136 MMHG | OXYGEN SATURATION: 94 %

## 2022-11-11 DIAGNOSIS — I73.9 PERIPHERAL VASCULAR DISEASE, UNSPECIFIED (HCC): Primary | ICD-10-CM

## 2022-11-11 DIAGNOSIS — I25.10 ASCVD (ARTERIOSCLEROTIC CARDIOVASCULAR DISEASE): ICD-10-CM

## 2022-11-11 DIAGNOSIS — I10 ESSENTIAL HYPERTENSION: ICD-10-CM

## 2022-11-11 DIAGNOSIS — E78.5 HYPERLIPIDEMIA, UNSPECIFIED HYPERLIPIDEMIA TYPE: ICD-10-CM

## 2022-11-11 PROCEDURE — 3078F DIAST BP <80 MM HG: CPT | Performed by: NURSE PRACTITIONER

## 2022-11-11 PROCEDURE — 3017F COLORECTAL CA SCREEN DOC REV: CPT | Performed by: NURSE PRACTITIONER

## 2022-11-11 PROCEDURE — 1123F ACP DISCUSS/DSCN MKR DOCD: CPT | Performed by: NURSE PRACTITIONER

## 2022-11-11 PROCEDURE — 1036F TOBACCO NON-USER: CPT | Performed by: NURSE PRACTITIONER

## 2022-11-11 PROCEDURE — G8427 DOCREV CUR MEDS BY ELIG CLIN: HCPCS | Performed by: NURSE PRACTITIONER

## 2022-11-11 PROCEDURE — G8484 FLU IMMUNIZE NO ADMIN: HCPCS | Performed by: NURSE PRACTITIONER

## 2022-11-11 PROCEDURE — 99214 OFFICE O/P EST MOD 30 MIN: CPT | Performed by: NURSE PRACTITIONER

## 2022-11-11 PROCEDURE — G8417 CALC BMI ABV UP PARAM F/U: HCPCS | Performed by: NURSE PRACTITIONER

## 2022-11-11 PROCEDURE — 93000 ELECTROCARDIOGRAM COMPLETE: CPT | Performed by: NURSE PRACTITIONER

## 2022-11-11 PROCEDURE — 3074F SYST BP LT 130 MM HG: CPT | Performed by: NURSE PRACTITIONER

## 2022-11-11 RX ORDER — EVOLOCUMAB 140 MG/ML
140 INJECTION, SOLUTION SUBCUTANEOUS
Qty: 2 ML | Refills: 5 | Status: SHIPPED | OUTPATIENT
Start: 2022-11-11

## 2022-11-11 ASSESSMENT — ENCOUNTER SYMPTOMS
SHORTNESS OF BREATH: 0
ORTHOPNEA: 0

## 2022-11-11 NOTE — PROGRESS NOTES
11/11/2022  Primary cardiologist: Dr. Aurora Ramirez:   Vicky Zambrano  is an established 67 y.o.  male here for a 9 month follow up on ASCVD, hypertension, hyperlipidemia      SUBJECTIVE/OBJECTIVE:  Vicky Zambrano is a 67 y.o. male with a history of  coronary artery disease, CABG x4 hypertension, hyperlipidemia, peripheral vascular disease, PTA of L SFA and right FP bypass, and COPD      HPI :   Vicky Zambrano reports he is feeling well. Denies claudication symptoms. He has had no episodes of chest pain or shortness of breath since his last office visit. Reports compliance with medications. Review of Systems   Constitutional: Negative for diaphoresis and malaise/fatigue. Cardiovascular:  Negative for chest pain, claudication, dyspnea on exertion, irregular heartbeat, leg swelling, near-syncope, orthopnea, palpitations and paroxysmal nocturnal dyspnea. Respiratory:  Negative for shortness of breath. Neurological:  Negative for dizziness and light-headedness. Vitals:    11/11/22 1338   BP: 136/70   Site: Left Upper Arm   Position: Sitting   Cuff Size: Large Adult   Pulse: 63   SpO2: 94%   Weight: 223 lb 12.8 oz (101.5 kg)   Height: 5' 11\" (1.803 m)     Patient-Reported Vitals 9/1/2021   Patient-Reported Systolic 566   Patient-Reported Diastolic 90   Patient-Reported Pulse 76   Patient-Reported SpO2 93     Wt Readings from Last 3 Encounters:   11/11/22 223 lb 12.8 oz (101.5 kg)   08/22/22 224 lb (101.6 kg)   05/23/22 226 lb 3.2 oz (102.6 kg)     Body mass index is 31.21 kg/m². Physical Exam  Vitals reviewed. HENT:      Head: Normocephalic. Mouth/Throat:      Mouth: Mucous membranes are moist.   Eyes:      Extraocular Movements: Extraocular movements intact. Pupils: Pupils are equal, round, and reactive to light. Neck:      Vascular: No carotid bruit. Cardiovascular:      Rate and Rhythm: Normal rate and regular rhythm. Pulses: Normal pulses.    Pulmonary:      Effort: Pulmonary effort is normal. Breath sounds: No wheezing or rales. Abdominal:      General: There is no distension. Palpations: Abdomen is soft. Tenderness: There is no abdominal tenderness. Musculoskeletal:         General: Normal range of motion. Cervical back: No tenderness. Right lower leg: No edema. Left lower leg: No edema. Skin:     General: Skin is warm and dry. Capillary Refill: Capillary refill takes less than 2 seconds. Neurological:      Mental Status: He is alert and oriented to person, place, and time. Psychiatric:         Mood and Affect: Mood normal.         Behavior: Behavior normal.              Current Outpatient Medications   Medication Sig Dispense Refill    Evolocumab (REPATHA SURECLICK) 006 MG/ML SOAJ Inject 140 mg into the skin every 14 days 2 mL 0    albuterol sulfate HFA (PROVENTIL;VENTOLIN;PROAIR) 108 (90 Base) MCG/ACT inhaler Inhale 2 puffs into the lungs 4 times daily as needed for Wheezing 3 each 1    amLODIPine (NORVASC) 5 MG tablet TAKE 1 TABLET BY MOUTH ONCE DAILY IN THE MORNING 90 tablet 1    carvedilol (COREG) 12.5 MG tablet TAKE 1 TABLET BY MOUTH TWICE DAILY WITH MEALS 180 tablet 1    clopidogrel (PLAVIX) 75 MG tablet Take 1/2 (one-half) tablet by mouth once daily 45 tablet 1    lisinopril (PRINIVIL;ZESTRIL) 20 MG tablet Take 1 tablet by mouth once daily 90 tablet 1    tiotropium-olodaterol (STIOLTO RESPIMAT) 2.5-2.5 MCG/ACT AERS Inhale 2 puffs into the lungs daily 3 each 1    aspirin 81 MG tablet Take 81 mg by mouth nightly Over The Counter      sildenafil (VIAGRA) 100 MG tablet Take 1 tablet by mouth as needed for Erectile Dysfunction (Patient not taking: Reported on 11/11/2022) 30 tablet 1     No current facility-administered medications for this visit. All pertinent data reviewed and discussed with patient       ASSESSMENT/PLAN:    ASCVD  Doing well. He remains asymptomatic.   I have encouraged him to add exercise along with better control of diet to assist with secondary prevention  Continue with aspirin, Plavix and carvedilol  Unfortunately he did not tolerate statin therapy    PVD  Without claudication. Stable  Encouraged to add a walking program  Continue aspirin    Hypertension  Blood pressure stable with use of amlodipine which will be continued. Encourage a low-salt diet. Weight loss will also help to control blood pressure    Hyperlipidemia  Lipids noted from August 2022  HDL 45, LDL 80. Lipids have improved and are near goal of LDL of 70 with initiation of PCSK9. Continue with Repatha   Unfortunately did not tolerate statin due to severe myalgia    Tests ordered: None  Follow-up 9 months or sooner if needed    Signed:  Artemus Gitelman, APRN - CNP, 11/11/2022, 1:51 PM    An electronic signature was used to authenticate this note. Please note this report has been partially produced using speech recognition software and may contain errors related to that system including errors in grammar, punctuation, and spelling, as well as words and phrases that may be inappropriate. If there are any questions or concerns please feel free to contact the dictating provider for clarification.

## 2022-12-07 ENCOUNTER — TELEPHONE (OUTPATIENT)
Dept: CARDIOLOGY CLINIC | Age: 72
End: 2022-12-07

## 2023-02-02 ENCOUNTER — TELEMEDICINE (OUTPATIENT)
Dept: FAMILY MEDICINE CLINIC | Age: 73
End: 2023-02-02
Payer: MEDICARE

## 2023-02-02 DIAGNOSIS — Z00.00 MEDICARE ANNUAL WELLNESS VISIT, SUBSEQUENT: Primary | ICD-10-CM

## 2023-02-02 PROCEDURE — G0439 PPPS, SUBSEQ VISIT: HCPCS | Performed by: STUDENT IN AN ORGANIZED HEALTH CARE EDUCATION/TRAINING PROGRAM

## 2023-02-02 PROCEDURE — G8484 FLU IMMUNIZE NO ADMIN: HCPCS | Performed by: STUDENT IN AN ORGANIZED HEALTH CARE EDUCATION/TRAINING PROGRAM

## 2023-02-02 PROCEDURE — 1123F ACP DISCUSS/DSCN MKR DOCD: CPT | Performed by: STUDENT IN AN ORGANIZED HEALTH CARE EDUCATION/TRAINING PROGRAM

## 2023-02-02 PROCEDURE — 3017F COLORECTAL CA SCREEN DOC REV: CPT | Performed by: STUDENT IN AN ORGANIZED HEALTH CARE EDUCATION/TRAINING PROGRAM

## 2023-02-02 SDOH — ECONOMIC STABILITY: FOOD INSECURITY: WITHIN THE PAST 12 MONTHS, YOU WORRIED THAT YOUR FOOD WOULD RUN OUT BEFORE YOU GOT MONEY TO BUY MORE.: NEVER TRUE

## 2023-02-02 SDOH — ECONOMIC STABILITY: INCOME INSECURITY: HOW HARD IS IT FOR YOU TO PAY FOR THE VERY BASICS LIKE FOOD, HOUSING, MEDICAL CARE, AND HEATING?: NOT HARD AT ALL

## 2023-02-02 SDOH — ECONOMIC STABILITY: FOOD INSECURITY: WITHIN THE PAST 12 MONTHS, THE FOOD YOU BOUGHT JUST DIDN'T LAST AND YOU DIDN'T HAVE MONEY TO GET MORE.: NEVER TRUE

## 2023-02-02 SDOH — ECONOMIC STABILITY: HOUSING INSECURITY
IN THE LAST 12 MONTHS, WAS THERE A TIME WHEN YOU DID NOT HAVE A STEADY PLACE TO SLEEP OR SLEPT IN A SHELTER (INCLUDING NOW)?: NO

## 2023-02-02 ASSESSMENT — PATIENT HEALTH QUESTIONNAIRE - PHQ9
SUM OF ALL RESPONSES TO PHQ QUESTIONS 1-9: 0
SUM OF ALL RESPONSES TO PHQ QUESTIONS 1-9: 0
SUM OF ALL RESPONSES TO PHQ9 QUESTIONS 1 & 2: 0
SUM OF ALL RESPONSES TO PHQ QUESTIONS 1-9: 0
SUM OF ALL RESPONSES TO PHQ QUESTIONS 1-9: 0
1. LITTLE INTEREST OR PLEASURE IN DOING THINGS: 0
2. FEELING DOWN, DEPRESSED OR HOPELESS: 0

## 2023-02-02 ASSESSMENT — LIFESTYLE VARIABLES
HOW OFTEN DO YOU HAVE A DRINK CONTAINING ALCOHOL: NEVER
HOW MANY STANDARD DRINKS CONTAINING ALCOHOL DO YOU HAVE ON A TYPICAL DAY: PATIENT DOES NOT DRINK

## 2023-02-02 NOTE — PROGRESS NOTES
Medicare Annual Wellness Visit    Cisco Morton is here for Medicare AWV    Assessment & Plan   Medicare annual wellness visit, subsequent      Recommendations for Preventive Services Due: see orders and patient instructions/AVS.  Recommended screening schedule for the next 5-10 years is provided to the patient in written form: see Patient Instructions/AVS.     No follow-ups on file. Subjective       Patient's complete Health Risk Assessment and screening values have been reviewed and are found in Flowsheets. The following problems were reviewed today and where indicated follow up appointments were made and/or referrals ordered. Positive Risk Factor Screenings with Interventions:                 Weight and Activity:  Physical Activity: Inactive    Days of Exercise per Week: 0 days    Minutes of Exercise per Session: 0 min     On average, how many days per week do you engage in moderate to strenuous exercise (like a brisk walk)?: 0 days  Have you lost any weight without trying in the past 3 months?: No         Inactivity Interventions:  Patient declined any further interventions or treatment  Obesity Interventions:  Patient declines any further evaluation or treatment            Vision Screen:  Do you have difficulty driving, watching TV, or doing any of your daily activities because of your eyesight?: No  Have you had an eye exam within the past year?: (!) No  No results found. Interventions:   Patient encouraged to make appointment with their eye specialist                      Objective      Patient-Reported Vitals  No data recorded     Unable to obtain 3 vital signs due to patient not having equipment to take blood pressure/temperature.            Allergies   Allergen Reactions    Statins      \"Paralyzes Muscles And Causes Muscle Weakness\"    Lansoprazole      dizziness    Lipitor [Atorvastatin Calcium]      Muscle weakness    Spiriva Handihaler [Tiotropium Bromide Monohydrate]      Hoarse voice Prior to Visit Medications    Medication Sig Taking? Authorizing Provider   Evolocumab (REPATHA SURECLICK) 403 MG/ML SOAJ Inject 140 mg into the skin every 14 days Yes CHRISTOPHER Flynn - CNP   albuterol sulfate HFA (PROVENTIL;VENTOLIN;PROAIR) 108 (90 Base) MCG/ACT inhaler Inhale 2 puffs into the lungs 4 times daily as needed for Wheezing Yes Luba Leach MD   amLODIPine (NORVASC) 5 MG tablet TAKE 1 TABLET BY MOUTH ONCE DAILY IN THE MORNING Yes Luba Leach MD   carvedilol (COREG) 12.5 MG tablet TAKE 1 TABLET BY MOUTH TWICE DAILY WITH MEALS Yes Luba Leach MD   clopidogrel (PLAVIX) 75 MG tablet Take 1/2 (one-half) tablet by mouth once daily Yes Luba Leach MD   lisinopril (PRINIVIL;ZESTRIL) 20 MG tablet Take 1 tablet by mouth once daily Yes Luba Leach MD   tiotropium-olodaterol (STIOLTO RESPIMAT) 2.5-2.5 MCG/ACT AERS Inhale 2 puffs into the lungs daily Yes Luba Leach MD   aspirin 81 MG tablet Take 81 mg by mouth nightly Over The Counter Yes Historical Provider, MD   sildenafil (VIAGRA) 100 MG tablet Take 1 tablet by mouth as needed for Erectile Dysfunction  Patient not taking: No sig reported  Luba Leach MD       CareTe (Including outside providers/suppliers regularly involved in providing care):   Patient Care Team:  Luba Leach MD as PCP - General  Luba Leach MD as PCP - Empaneled Provider  Kenya Paez MD as Consulting Physician (Cardiology)     Reviewed and updated this visit:  Allergies  Meds  Med Hx  Surg Hx  Soc Hx  Fam Hx        I, Linda Jovel LPN, 2/6/3318, performed the documented evaluation under the direct supervision of the attending physician. Bear Solares, was evaluated through a synchronous (real-time) audio encounter. The patient (or guardian if applicable) is aware that this is a billable service, which includes applicable co-pays.  This Virtual Visit was conducted with patient's (and/or legal guardian's) consent. The visit was conducted pursuant to the emergency declaration under the [facility], [address] [place] [address] authority and the [employer] Act. Patient identification was verified, and a caregiver was present when appropriate. The patient was located at Home: [address]  Provider was located at [place] [address]): [address] [place] [address]. [place] [id number]         Total time spent for this encounter: Not billed by time    --Maggi Carson LPN on 1/4/8530 at 4:56 PM    An electronic signature was used to authenticate this note. Maggi Carson LPN     This encounter was performed under myTatianna DOs, direct supervision, 2/2/2023.

## 2023-02-02 NOTE — PATIENT INSTRUCTIONS
Personalized Preventive Plan for Nonda Isis - 2/2/2023  Medicare offers a range of preventive health benefits. Some of the tests and screenings are paid in full while other may be subject to a deductible, co-insurance, and/or copay. Some of these benefits include a comprehensive review of your medical history including lifestyle, illnesses that may run in your family, and various assessments and screenings as appropriate. After reviewing your medical record and screening and assessments performed today your provider may have ordered immunizations, labs, imaging, and/or referrals for you. A list of these orders (if applicable) as well as your Preventive Care list are included within your After Visit Summary for your review. Other Preventive Recommendations:    A preventive eye exam performed by an eye specialist is recommended every 1-2 years to screen for glaucoma; cataracts, macular degeneration, and other eye disorders. A preventive dental visit is recommended every 6 months. Try to get at least 150 minutes of exercise per week or 10,000 steps per day on a pedometer . Order or download the FREE \"Exercise & Physical Activity: Your Everyday Guide\" from [employer]. Call 2-203.126.7995 or search [employer] online. You need 3708-6787 mg of calcium and 2538-2914 IU of vitamin D per day. It is possible to meet your calcium requirement with diet alone, but a vitamin D supplement is usually necessary to meet this goal.  When exposed to the sun, use a sunscreen that protects against both UVA and UVB radiation with an SPF of 30 or greater. Reapply every 2 to 3 hours or after sweating, drying off with a towel, or swimming. Always wear a seat belt when traveling in a car. Always wear a helmet when riding a bicycle or motorcycle.

## 2023-02-06 ENCOUNTER — OFFICE VISIT (OUTPATIENT)
Dept: FAMILY MEDICINE CLINIC | Age: 73
End: 2023-02-06
Payer: MEDICARE

## 2023-02-06 VITALS
HEART RATE: 69 BPM | DIASTOLIC BLOOD PRESSURE: 88 MMHG | OXYGEN SATURATION: 92 % | WEIGHT: 222 LBS | SYSTOLIC BLOOD PRESSURE: 148 MMHG | BODY MASS INDEX: 31.08 KG/M2 | HEIGHT: 71 IN

## 2023-02-06 DIAGNOSIS — I25.118 ATHEROSCLEROTIC HEART DISEASE OF NATIVE CORONARY ARTERY WITH OTHER FORMS OF ANGINA PECTORIS (HCC): ICD-10-CM

## 2023-02-06 DIAGNOSIS — I73.9 PERIPHERAL VASCULAR DISEASE, UNSPECIFIED (HCC): ICD-10-CM

## 2023-02-06 DIAGNOSIS — N18.30 STAGE 3 CHRONIC KIDNEY DISEASE, UNSPECIFIED WHETHER STAGE 3A OR 3B CKD (HCC): ICD-10-CM

## 2023-02-06 DIAGNOSIS — I10 ESSENTIAL HYPERTENSION: ICD-10-CM

## 2023-02-06 DIAGNOSIS — J43.9 PULMONARY EMPHYSEMA, UNSPECIFIED EMPHYSEMA TYPE (HCC): ICD-10-CM

## 2023-02-06 DIAGNOSIS — R41.3 MEMORY DIFFICULTIES: Primary | ICD-10-CM

## 2023-02-06 PROCEDURE — G8484 FLU IMMUNIZE NO ADMIN: HCPCS | Performed by: FAMILY MEDICINE

## 2023-02-06 PROCEDURE — 1036F TOBACCO NON-USER: CPT | Performed by: FAMILY MEDICINE

## 2023-02-06 PROCEDURE — G8417 CALC BMI ABV UP PARAM F/U: HCPCS | Performed by: FAMILY MEDICINE

## 2023-02-06 PROCEDURE — 3017F COLORECTAL CA SCREEN DOC REV: CPT | Performed by: FAMILY MEDICINE

## 2023-02-06 PROCEDURE — 1123F ACP DISCUSS/DSCN MKR DOCD: CPT | Performed by: FAMILY MEDICINE

## 2023-02-06 PROCEDURE — 3023F SPIROM DOC REV: CPT | Performed by: FAMILY MEDICINE

## 2023-02-06 PROCEDURE — G8427 DOCREV CUR MEDS BY ELIG CLIN: HCPCS | Performed by: FAMILY MEDICINE

## 2023-02-06 PROCEDURE — 99214 OFFICE O/P EST MOD 30 MIN: CPT | Performed by: FAMILY MEDICINE

## 2023-02-06 PROCEDURE — 3079F DIAST BP 80-89 MM HG: CPT | Performed by: FAMILY MEDICINE

## 2023-02-06 PROCEDURE — 3077F SYST BP >= 140 MM HG: CPT | Performed by: FAMILY MEDICINE

## 2023-02-06 ASSESSMENT — ENCOUNTER SYMPTOMS
SHORTNESS OF BREATH: 0
DIARRHEA: 0
CHEST TIGHTNESS: 0
NAUSEA: 0
BLOOD IN STOOL: 0
TROUBLE SWALLOWING: 0
EYE PAIN: 0
ABDOMINAL PAIN: 0
VOMITING: 0
WHEEZING: 0

## 2023-02-06 NOTE — PROGRESS NOTES
2/6/2023    Suzie Elaine    Chief Complaint   Patient presents with    Memory Loss     Forgetfulness, worse this last month. Forgetting little things, starting to bother him. HPI  History was obtained from the patient. Torito Zarate is a 67 y.o. male who presents today with history of increased word finding issues for the last few weeks. No real speech issues. Is not getting loss has not had any true disorientation. He has a past history of hypertension, COPD, coronary disease with bypass grafting, CKD 3, chronic low back pain and GE reflux. He has been very compliant with his medications his wife has Parkinson's and he has been helping take care of her for a number of years. His last labs were in August 2022. He does not have any speech difficulty, ambulation difficulty or focal logic changes. REVIEW OF SYMPTOMS    Review of Systems   Constitutional:  Negative for activity change and fatigue. HENT:  Negative for congestion, hearing loss, mouth sores and trouble swallowing. Eyes:  Negative for pain and visual disturbance. Respiratory:  Negative for chest tightness, shortness of breath and wheezing. Cardiovascular:  Negative for chest pain and palpitations. Gastrointestinal:  Negative for abdominal pain, blood in stool, diarrhea, nausea and vomiting. Endocrine: Negative for cold intolerance, polydipsia and polyuria. Genitourinary:  Negative for dysuria, frequency and urgency. Musculoskeletal:  Negative for arthralgias, gait problem and neck stiffness. Skin:  Negative for rash. Allergic/Immunologic: Negative for environmental allergies. Neurological:  Negative for dizziness, seizures, speech difficulty and weakness. Hematological:  Does not bruise/bleed easily. Psychiatric/Behavioral:  Negative for agitation, confusion, hallucinations, self-injury and suicidal ideas. The patient is not nervous/anxious.       PAST MEDICAL HISTORY  Past Medical History:   Diagnosis Date Acid reflux     Basal Cell Skin Cancer Excised Nose 1992    Basal Cell Skin Cancer Excised Nose    CAD (coronary artery disease)     Sees Dr. Alejandro Mar    COPD (chronic obstructive pulmonary disease) ([facility] [address]     Essential hypertension     H/O cardiovascular stress test 5/29/2014    cardiolite-normal, no ischemia, EF 60%    H/O cardiovascular stress test 1/18/2015    cardiolite-normal, EF51%    H/O cardiovascular stress test 02/12/2018    EF60% normal study    H/O Doppler lower arterial ultrasound 04/22/2019    No significant occlusive arterial disease, Patent right femoral popliteal bypass graft with biphasic waveforms, bilateral abis show mild peripheral arterial disease    H/O Doppler ultrasound 6/1/2016    Lower Ext Arterial- normal study, mild stenosis in RLE    H/O echocardiogram 5/28/2014    EF35-45% mild MR, mild AV calcification    H/O echocardiogram 09/25/2019    EF 90-14%, grade I diastolic dysfunction, No significant valvular disease, no pericardial effusion     Heart attack ([facility] [address] 2014    History of cardiac cath 02/12/2019    PATENT RENAL ARTERIES DISTAL ABD AORTA ATHEROSCLEROTIC, ANEYRYSMAL, RCIA PARENT, REIA  PATENT, RT SFA OCCLUDED, DISTALLY RECONSTITUTES, 3 V RUNOFF,  LT MARIA ISABEL PATENT, CHRIS 70% TO 0%,  LT SFA 80% TO 0% WITH ATHRECTOMY TO PTA, LT POP PATENT,  3 V run off                        History of nuclear stress test 09/25/2019    EF 51%, Normal    History of percutaneous left heart catheterization 5/3/2014    RCA 99% distal stenosis EF55%    History of Peripheral Angiogram 03/07/2019    RCIA PATENT, REIA CALCIFIED 70% STENOSIS, RSFA OCCLUDED NO STUMP, PFA  PATENT FILL DISTAL SFA WITH COLLATERLS, RT POP PATENT, 3 V RUN OFF    Hyperlipidemia     Lumbosacral spondylosis     Primary erectile dysfunction     S/P CABG x 4 2014    vein graft diagonal coronary artery,vein graft distal RCA,vein graft obtuse marginal coronary artery and left internal mammary artery to the left anterior descending    Shortness of breath on exertion     Teeth missing     Upper And Lower    Wears dentures     Full Upper    Wears glasses        FAMILY HISTORY  Family History   Adopted: Yes   Problem Relation Age of Onset    Heart Disease Mother     Hypertension Mother     Diabetes Mother     Lung Cancer Sister     No Known Problems Sister     No Known Problems Sister     Cancer Brother         unknown [place]    COPD Brother     No Known Problems Brother     No Known Problems Brother        SOCIAL HISTORY  Social History     Socioeconomic History    Marital status:      Spouse name: None    Number of children: None    Years of education: None    Highest education level: None   Tobacco Use    Smoking status: Former     Packs/day: 1.00     Years: 55.00     Pack years: 55.00     Types: Cigarettes, Pipe, Cigars     Start date:      Quit date:      Years since quittin.1    Smokeless tobacco: Former     Types: Chew     Quit date:    Vaping Use    Vaping Use: Never used   Substance and Sexual Activity    Alcohol use: No     Comment: Caffeine: 3-4 cups of coffee daily     Drug use: No    Sexual activity: Not Currently     Social Determinants of Health     Financial Resource Strain: Low Risk     Difficulty of Paying Living Expenses: Not hard at all   Food Insecurity: No Food Insecurity    Worried About [facility] in the Last Year: Never true    Ran Out of Food in the Last Year: Never true   Transportation Needs: Unknown    Lack of Transportation (Non-Medical):  No   Physical Activity: Inactive    Days of Exercise per Week: 0 days    Minutes of Exercise per Session: 0 min   Housing Stability: Unknown    Unstable Housing in the Last Year: No        SURGICAL HISTORY  Past Surgical History:   Procedure Laterality Date    CARDIAC SURGERY      CABG ([address])    COLONOSCOPY  Last Done In 2019    Polyps Removed In Past    DENTAL SURGERY      Teeth Extracted In Past    FEMORAL BYPASS Right 3/25/2019    RIGHT FEMORAL POPLITEAL BYPASS performed by Deshawn Kimbrough MD at [address] Left 02/12/2019    \"Stent\"    SKIN CANCER EXCISION  1992    Basal Cell Skin Cancer Excised Nose    TONSILLECTOMY AND ADENOIDECTOMY  1950's                 CURRENT MEDICATIONS  Current Outpatient Medications   Medication Sig Dispense Refill    Evolocumab (REPATHA SURECLICK) 578 MG/ML SOAJ Inject 140 mg into the skin every 14 days 2 mL 5    albuterol sulfate HFA (PROVENTIL;VENTOLIN;PROAIR) 108 (90 Base) MCG/ACT inhaler Inhale 2 puffs into the lungs 4 times daily as needed for Wheezing 3 each 1    amLODIPine (NORVASC) 5 MG tablet TAKE 1 TABLET BY MOUTH ONCE DAILY IN THE MORNING 90 tablet 1    carvedilol (COREG) 12.5 MG tablet TAKE 1 TABLET BY MOUTH TWICE DAILY WITH MEALS 180 tablet 1    clopidogrel (PLAVIX) 75 MG tablet Take 1/2 (one-half) tablet by mouth once daily 45 tablet 1    sildenafil (VIAGRA) 100 MG tablet Take 1 tablet by mouth as needed for Erectile Dysfunction 30 tablet 1    lisinopril (PRINIVIL;ZESTRIL) 20 MG tablet Take 1 tablet by mouth once daily 90 tablet 1    tiotropium-olodaterol (STIOLTO RESPIMAT) 2.5-2.5 MCG/ACT AERS Inhale 2 puffs into the lungs daily 3 each 1    aspirin 81 MG tablet Take 81 mg by mouth nightly Over The Counter       No current facility-administered medications for this visit. ALLERGIES  Allergies   Allergen Reactions    Statins      \"Paralyzes Muscles And Causes Muscle Weakness\"    Lansoprazole      dizziness    Lipitor [Atorvastatin Calcium]      Muscle weakness    Spiriva Handihaler [Tiotropium Bromide Monohydrate]      Hoarse voice       PHYSICAL EXAM    BP (!) 148/88 (Site: Right Upper Arm, Position: Sitting, Cuff Size: Medium Adult)   Pulse 69   Ht 5' 11\" (1.803 m)   Wt 222 lb (100.7 kg)   SpO2 92%   BMI 30.96 kg/m²     Physical Exam  Vitals and nursing note reviewed. Constitutional:       General: He is not in acute distress. Appearance: Normal appearance. He is well-developed.  He is not ill-appearing or toxic-appearing. HENT:      Head: Normocephalic and atraumatic. Nose: Nose normal. No congestion. Mouth/Throat:      Mouth: Mucous membranes are moist.      Pharynx: Oropharynx is clear. No posterior oropharyngeal erythema. Eyes:      Pupils: Pupils are equal, round, and reactive to light. Cardiovascular:      Rate and Rhythm: Normal rate and regular rhythm. Heart sounds: Normal heart sounds. No murmur heard. Pulmonary:      Effort: Pulmonary effort is normal. No respiratory distress. Breath sounds: No stridor. Wheezing present. No rales. Comments: Patient does have expiratory prolongation and occasional cough. No obvious shortness of breath or wheeze  Chest:      Chest wall: No tenderness. Abdominal:      Palpations: Abdomen is soft. Musculoskeletal:         General: No deformity. Normal range of motion. Cervical back: Normal range of motion and neck supple. Skin:     General: Skin is warm and dry. Coloration: Skin is not jaundiced. Findings: No bruising. Neurological:      General: No focal deficit present. Mental Status: He is alert and oriented to person, place, and time. Mental status is at baseline. Cranial Nerves: No cranial nerve deficit. Sensory: No sensory deficit. Motor: No weakness. Comments: Patient's MMSE was 30 out of 30 today. Normal speech was noted. Psychiatric:         Mood and Affect: Mood normal.         Behavior: Behavior normal.         Thought Content: Thought content normal.       ASSESSMENT & PLAN     Diagnosis Orders   1. Memory difficulties  TSH    LIPID PANEL    MRI BRAIN WO CONTRAST      2. Essential hypertension  CBC    Comprehensive Metabolic Panel      3. Pulmonary emphysema, unspecified emphysema type ([address]        4. Peripheral vascular disease, unspecified ([address]        5.  Atherosclerotic heart disease of native coronary artery with other forms of angina pectoris ([address]  LIPID PANEL 6. Stage 3 chronic kidney disease, unspecified whether stage 3a or 3b CKD ([facility] [address]        As patient is quite concerned about this we will get MRI of head have him work on increased exercise and stay mentally active by reading or working puzzles. Encouraged him to follow-up with cardiology as its been a while since he has seen his cardiologist and had any type of stress testing done. We will also check CBC, CMP, TSH and lipid panel and have a follow-up for that result. He remains on his standard cardiac meds including aspirin and Repatha as well as antihypertensives as he has statin intolerance. Return in 4 weeks (on 3/6/2023), or if symptoms worsen or fail to improve.          Electronically signed by Kim Schmid MD on 2/6/2023

## 2023-02-07 DIAGNOSIS — I25.118 ATHEROSCLEROTIC HEART DISEASE OF NATIVE CORONARY ARTERY WITH OTHER FORMS OF ANGINA PECTORIS (HCC): ICD-10-CM

## 2023-02-07 DIAGNOSIS — R41.3 MEMORY DIFFICULTIES: ICD-10-CM

## 2023-02-07 DIAGNOSIS — I10 ESSENTIAL HYPERTENSION: ICD-10-CM

## 2023-02-07 LAB
A/G RATIO: 1.4 (ref 1.1–2.2)
ALBUMIN SERPL-MCNC: 4.1 G/DL (ref 3.4–5)
ALP BLD-CCNC: 81 U/L (ref 40–129)
ALT SERPL-CCNC: 13 U/L (ref 10–40)
ANION GAP SERPL CALCULATED.3IONS-SCNC: 12 MMOL/L (ref 3–16)
AST SERPL-CCNC: 18 U/L (ref 15–37)
BILIRUB SERPL-MCNC: 0.5 MG/DL (ref 0–1)
BUN BLDV-MCNC: 17 MG/DL (ref 7–20)
CALCIUM SERPL-MCNC: 10.2 MG/DL (ref 8.3–10.6)
CHLORIDE BLD-SCNC: 102 MMOL/L (ref 99–110)
CHOLESTEROL, TOTAL: 158 MG/DL (ref 0–199)
CO2: 26 MMOL/L (ref 21–32)
CREAT SERPL-MCNC: 1 MG/DL (ref 0.8–1.3)
GFR SERPL CREATININE-BSD FRML MDRD: >60 ML/MIN/{1.73_M2}
GLUCOSE BLD-MCNC: 103 MG/DL (ref 70–99)
HCT VFR BLD CALC: 45.1 % (ref 40.5–52.5)
HDLC SERPL-MCNC: 47 MG/DL (ref 40–60)
HEMOGLOBIN: 15.8 G/DL (ref 13.5–17.5)
LDL CHOLESTEROL CALCULATED: 92 MG/DL
MCH RBC QN AUTO: 33.1 PG (ref 26–34)
MCHC RBC AUTO-ENTMCNC: 35.1 G/DL (ref 31–36)
MCV RBC AUTO: 94.5 FL (ref 80–100)
PDW BLD-RTO: 13.7 % (ref 12.4–15.4)
PLATELET # BLD: 208 K/UL (ref 135–450)
PMV BLD AUTO: 6.7 FL (ref 5–10.5)
POTASSIUM SERPL-SCNC: 4.3 MMOL/L (ref 3.5–5.1)
RBC # BLD: 4.78 M/UL (ref 4.2–5.9)
SODIUM BLD-SCNC: 140 MMOL/L (ref 136–145)
TOTAL PROTEIN: 7.1 G/DL (ref 6.4–8.2)
TRIGL SERPL-MCNC: 96 MG/DL (ref 0–150)
TSH SERPL DL<=0.05 MIU/L-ACNC: 1.36 UIU/ML (ref 0.27–4.2)
VLDLC SERPL CALC-MCNC: 19 MG/DL
WBC # BLD: 6.1 K/UL (ref 4–11)

## 2023-02-08 ENCOUNTER — OFFICE VISIT (OUTPATIENT)
Dept: CARDIOLOGY CLINIC | Age: 73
End: 2023-02-08
Payer: MEDICARE

## 2023-02-08 VITALS
BODY MASS INDEX: 31.64 KG/M2 | SYSTOLIC BLOOD PRESSURE: 150 MMHG | WEIGHT: 226 LBS | HEIGHT: 71 IN | HEART RATE: 68 BPM | DIASTOLIC BLOOD PRESSURE: 80 MMHG

## 2023-02-08 DIAGNOSIS — I10 ESSENTIAL HYPERTENSION: Primary | ICD-10-CM

## 2023-02-08 DIAGNOSIS — I25.10 ASCVD (ARTERIOSCLEROTIC CARDIOVASCULAR DISEASE): ICD-10-CM

## 2023-02-08 PROCEDURE — 3017F COLORECTAL CA SCREEN DOC REV: CPT | Performed by: INTERNAL MEDICINE

## 2023-02-08 PROCEDURE — 3078F DIAST BP <80 MM HG: CPT | Performed by: INTERNAL MEDICINE

## 2023-02-08 PROCEDURE — G8417 CALC BMI ABV UP PARAM F/U: HCPCS | Performed by: INTERNAL MEDICINE

## 2023-02-08 PROCEDURE — G8427 DOCREV CUR MEDS BY ELIG CLIN: HCPCS | Performed by: INTERNAL MEDICINE

## 2023-02-08 PROCEDURE — G8484 FLU IMMUNIZE NO ADMIN: HCPCS | Performed by: INTERNAL MEDICINE

## 2023-02-08 PROCEDURE — 3074F SYST BP LT 130 MM HG: CPT | Performed by: INTERNAL MEDICINE

## 2023-02-08 PROCEDURE — 1036F TOBACCO NON-USER: CPT | Performed by: INTERNAL MEDICINE

## 2023-02-08 PROCEDURE — 93000 ELECTROCARDIOGRAM COMPLETE: CPT | Performed by: INTERNAL MEDICINE

## 2023-02-08 PROCEDURE — 99214 OFFICE O/P EST MOD 30 MIN: CPT | Performed by: INTERNAL MEDICINE

## 2023-02-08 PROCEDURE — 1123F ACP DISCUSS/DSCN MKR DOCD: CPT | Performed by: INTERNAL MEDICINE

## 2023-02-08 NOTE — PROGRESS NOTES
CARDIOLOGY NOTE      2/8/2023    RE: Xochitl Sood  (1950)                               TO:  Dr. Madina Garza MD            CHIEF Nick New is a 67 y.o. male who was seen today for management of  PVD                                    :   Patient is here for    - Coronary artery disease, does not have chest pain. Patient is  compliant with prescribed medicines. - Hypertension,is  well controlled, pt is  compliant with medicines  - Hyperlipidimea, lipids are in acceptable range.  Pt  is  compliant with medicines  - PVD  stable                 The patient has been fatigued same symptoms pre CABG    Xochitl Sood has the following history recorded in care path:  Patient Active Problem List    Diagnosis Date Noted    Chronic renal disease, stage III [facility]) [[id number]] 08/22/2022    Hyperlipidemia     GERD (gastroesophageal reflux disease) 08/28/2020    Hyperglycemia 08/28/2020    Fatigue 09/17/2019    Shortness of breath 09/17/2019    COPD (chronic obstructive pulmonary disease) ([facility])     Lumbosacral spondylosis     Primary erectile dysfunction     Peripheral vascular disease, unspecified ([facility] 03/26/2019    Atherosclerotic heart disease of native coronary artery with other forms of angina pectoris ([facility] [address] 06/29/2018    H/O cardiovascular stress test 05/29/2014    S/P CABG x 4 05/05/2014    History of percutaneous left heart catheterization 05/03/2014    Essential hypertension 05/02/2014    Tobacco abuse 05/02/2014    NSTEMI (non-ST elevated myocardial infarction) ([facility] 05/02/2014     Current Outpatient Medications   Medication Sig Dispense Refill    Evolocumab (REPATHA SURECLICK) 370 MG/ML SOAJ Inject 140 mg into the skin every 14 days 2 mL 5    albuterol sulfate HFA (PROVENTIL;VENTOLIN;PROAIR) 108 (90 Base) MCG/ACT inhaler Inhale 2 puffs into the lungs 4 times daily as needed for Wheezing 3 each 1    amLODIPine (NORVASC) 5 MG tablet TAKE 1 TABLET BY MOUTH ONCE DAILY IN THE MORNING 90 tablet 1    carvedilol (COREG) 12.5 MG tablet TAKE 1 TABLET BY MOUTH TWICE DAILY WITH MEALS 180 tablet 1    clopidogrel (PLAVIX) 75 MG tablet Take 1/2 (one-half) tablet by mouth once daily 45 tablet 1    lisinopril (PRINIVIL;ZESTRIL) 20 MG tablet Take 1 tablet by mouth once daily 90 tablet 1    tiotropium-olodaterol (STIOLTO RESPIMAT) 2.5-2.5 MCG/ACT AERS Inhale 2 puffs into the lungs daily 3 each 1    aspirin 81 MG tablet Take 81 mg by mouth nightly Over The Counter      sildenafil (VIAGRA) 100 MG tablet Take 1 tablet by mouth as needed for Erectile Dysfunction (Patient not taking: Reported on 2/8/2023) 30 tablet 1     No current facility-administered medications for this visit.      Allergies: Statins, Lansoprazole, Lipitor [atorvastatin calcium], and Spiriva handihaler [tiotropium bromide monohydrate]  Past Medical History:   Diagnosis Date    Acid reflux     Basal Cell Skin Cancer Excised Nose 1992    Basal Cell Skin Cancer Excised Nose    CAD (coronary artery disease)     Sees Dr. Serita Gottron    COPD [facility]ch[facility] obstructive [facility]) [facility]     Essential hypertension     H/O cardiovascular stress test 5/29/2014    cardiolite-normal, no ischemia, EF 60%    H/O cardiovascular stress test 1/18/2015    cardiolite-normal, EF51%    H/O cardiovascular stress test 02/12/2018    EF60% normal study    H/O Doppler lower arterial ultrasound 04/22/2019    No significant occlusive arterial disease, Patent right femoral popliteal bypass graft with biphasic waveforms, bilateral abis show mild peripheral arterial disease    H/O Doppler ultrasound 6/1/2016    Lower Ext Arterial- normal study, mild stenosis in RLE    H/O echocardiogram 5/28/2014    EF35-45% mild MR, mild AV calcification    H/O echocardiogram 09/25/2019    EF 89-41%, grade I diastolic dysfunction, No significant valvular disease, no pericardial effusion     Heart attack ([facility] [address] 2014    History of cardiac cath 02/12/2019    PATENT RENAL ARTERIES DISTAL ABD AORTA ATHEROSCLEROTIC, ANEYRYSMAL, RCIA PARENT, REIA  PATENT, RT SFA OCCLUDED, DISTALLY RECONSTITUTES, 3 V RUNOFF,  LT MARIA ISABEL PATENT, CHRIS 70% TO 0%,  LT SFA 80% TO 0% WITH ATHRECTOMY TO PTA, LT POP PATENT,  3 V run off                        History of nuclear stress test 09/25/2019    EF 51%, Normal    History of percutaneous left heart catheterization 5/3/2014    RCA 99% distal stenosis EF55%    History of Peripheral Angiogram 03/07/2019    RCIA PATENT, REIA CALCIFIED 70% STENOSIS, RSFA OCCLUDED NO STUMP, PFA  PATENT FILL DISTAL SFA WITH COLLATERLS, RT POP PATENT, 3 V RUN OFF    Hyperlipidemia     Lumbosacral spondylosis     Primary erectile dysfunction     S/P CABG x 4 2014    vein graft diagonal coronary artery,vein graft distal RCA,vein graft obtuse marginal coronary artery and left internal mammary artery to the left anterior descending    Shortness of breath on exertion     Teeth missing     Upper And Lower    Wears dentures     Full Upper    Wears glasses      Past Surgical History:   Procedure Laterality Date    CARDIAC SURGERY  2014    CABG (4 Bypasses)    COLONOSCOPY  Last Done In 2019    Polyps Removed In Past    DENTAL SURGERY      Teeth Extracted In Past    FEMORAL BYPASS Right 3/25/2019    RIGHT FEMORAL POPLITEAL BYPASS performed by Jordan Arriaga MD [address] 02/12/2019    \"Stent\"    SKIN CANCER EXCISION  1992    Basal Cell Skin Cancer Excised Nose    TONSILLECTOMY AND ADENOIDECTOMY  1950's      As reviewed   Family History   Adopted: Yes   Problem Relation Age of Onset    Heart Disease Mother     Hypertension Mother     Diabetes Mother     Lung Cancer Sister     No Known Problems Sister     No Known Problems Sister     Cancer Brother         unknown [place]    COPD Brother     No Known Problems Brother     No Known Problems Brother      Social History     Tobacco Use    Smoking status: Former     Packs/day: 1.00     Years: 55.00     Pack years: 55.00     Types: Cigarettes, Pipe, Cigars Start date: 64     Quit date:      Years since quittin.1    Smokeless tobacco: Former     Types: Chew     Quit date:    Substance Use Topics    Alcohol use: No     Comment: Caffeine: 3-4 cups of coffee daily       Review of Systems:    Constitutional: Negative for diaphoresis and fatigue  Psychological:Negative for anxiety or depression  HENT: Negative for headaches, nasal congestion, sinus pain or vertigo  Eyes: Negative for visual disturbance. Endocrine: Negative for polydipsia/polyuria  Respiratory: Negative for shortness of breath  Cardiovascular: Negative for chest pain, dyspnea on exertion, claudication, edema, irregular heartbeat, murmur, palpitations or shortness of breath  Gastrointestinal: Negative for abdominal pain or heartburn  Genito-Urinary: Negative for urinary frequency/urgency  Musculoskeletal: Negative for muscle pain, muscular weakness, negative for pain in arm and leg or swelling in foot and leg  Neurological: Negative for dizziness, headaches, memory loss, numbness/tingling, visual changes, syncope  Dermatological: Negative for rash    Objective:  BP (!) 150/80 (Site: Left Upper Arm, Position: Sitting, Cuff Size: Large Adult)   Pulse 68   Ht 5' 11\" (1.803 m)   Wt 226 lb (102.5 kg)   BMI 31.52 kg/m²   Wt Readings from Last 3 Encounters:   23 226 lb (102.5 kg)   23 222 lb (100.7 kg)   22 223 lb 12.8 oz (101.5 kg)     Body mass index is 31.52 kg/m². GENERAL - Alert, oriented, pleasant, in no apparent distress. EYES: No jaundice, no conjunctival pallor. SKIN: It is warm & dry. No rashes. No Echhymosis    HEENT - No clinically significant abnormalities seen. Neck - Supple. No jugular venous distention noted. No carotid bruits. Cardiovascular - Normal S1 and S2 without obvious murmur or gallop. Extremities - No cyanosis, clubbing, or significant edema. Pulmonary - No respiratory distress. No wheezes or rales.     Abdomen - No masses, tenderness, or organomegaly. Musculoskeletal - No significant edema. No joint deformities. No muscle wasting. Neurologic - Cranial nerves II through XII are grossly intact. There were no gross focal neurologic abnormalities. Lab Review   No results found for: CKTOTAL, CKMB, CKMBINDEX, TROPONINT  BNP:    Lab Results   Component Value Date/Time    BNP 22 05/02/2014 07:30 AM     PT/INR:    Lab Results   Component Value Date    INR 0.97 03/18/2019     Lab Results   Component Value Date    LABA1C 5.9 03/01/2021    LABA1C 6.3 08/28/2020     Lab Results   Component Value Date    WBC 6.1 02/07/2023    HCT 45.1 02/07/2023    MCV 94.5 02/07/2023     02/07/2023     Lab Results   Component Value Date    CHOL 158 02/07/2023    TRIG 96 02/07/2023    HDL 47 02/07/2023    LDLCALC 92 02/07/2023    LDLDIRECT 155 (H) 05/03/2014     Lab Results   Component Value Date    ALT 13 02/07/2023    AST 18 02/07/2023     BMP:    Lab Results   Component Value Date/Time     02/07/2023 08:04 AM    K 4.3 02/07/2023 08:04 AM     02/07/2023 08:04 AM    CO2 26 02/07/2023 08:04 AM    BUN 17 02/07/2023 08:04 AM    CREATININE 1.0 02/07/2023 08:04 AM     CMP:   Lab Results   Component Value Date/Time     02/07/2023 08:04 AM    K 4.3 02/07/2023 08:04 AM     02/07/2023 08:04 AM    CO2 26 02/07/2023 08:04 AM    BUN 17 02/07/2023 08:04 AM    PROT 7.1 02/07/2023 08:04 AM    PROT 7.1 01/25/2011 08:36 AM     TSH:    Lab Results   Component Value Date/Time    TSH 1.36 02/07/2023 08:04 AM    TSHHS 0.883 06/01/2013 07:38 AM           Impression:    1.  Essential hypertension         Patient Active Problem List   Diagnosis Code    Essential hypertension I10    Tobacco abuse Z72.0    NSTEMI (non-ST elevated myocardial infarction) ([facility] [address] I21.4    S/P CABG x 4 Z95.1    History of percutaneous left heart catheterization Z98.890    H/O cardiovascular stress test Z92.89    Atherosclerotic heart disease of native coronary artery with other forms of angina pectoris [facility] I25.118    Peripheral vascular disease, unspecified ([facility] I73.9    COPD (chronic obstructive pulmonary disease) ([facility]) J44.9    Lumbosacral spondylosis M47.817    Primary erectile dysfunction N52.9    Fatigue R53.83    Shortness of breath R06.02    GERD (gastroesophageal reflux disease) K21.9    Hyperglycemia R73.9    Hyperlipidemia E78.5    Chronic renal disease, stage III ([facility] [[id number]] N18.30       Assessment & Plan:       - PVD   Had PTA   Of LSFA and Rt FP bypass , per art duplex per CTS  Patient is stable we will continue      -     CORONARY ARTERY DISEASE:  symptomatic     All available  tests in chart reviewed. Management discussed . Testing ordered   stress cardiolite        On plavix compliant we will continue                        -  Hypertension: Patients blood pressure is normal. Patient is advised about low sodium diet. Present medical regimen will not be changed. On prinivil compliant blood pressure is well controlled        -  LIPID MANAGEMENT:  Available lipid  lab data reviewed  and patient was given dietary advice. NCEP- ATP III guidelines reviewed with patient.     -   Changes  in medicines made: No     On repatha compliant we will check the LDL 92 yesterday                    -COPD patient stable has no issues               [facility] [place]

## 2023-02-08 NOTE — PATIENT INSTRUCTIONS
**It is YOUR responsibilty to bring medication bottles and/or updated medication list to [address] [place] [address]. This will allow us to better serve you and all your healthcare needs**    [facility] Laboratory Locations - No appointment necessary. Sites open Monday to Friday. Call your preferred location for test preparation, business   hours and other information you need. [employer] accepts [employer]. [address] Jonathan [place]ao, [address] [facility]  Phone: [phone] [place]  [address]  [address] [place], [address] [facility]  Phone: [phone]       Please be informed that if you contact our office outside of normal business hours the physician on call cannot help with any scheduling or rescheduling issues, procedure instruction questions or any type of medication issue. We advise you for any urgent/emergency that you go to the nearest emergency room! PLEASE CALL OUR OFFICE DURING NORMAL BUSINESS HOURS    Monday - Friday   8 am to 5 pm    [facility] [address]: [phone]    [place]:  [phone]    Thank you for allowing us to care for you today! We want to ensure we can follow your treatment plan and we strive to give you the best outcomes and experience possible. If you ever have a life threatening emergency and call 911 - for an ambulance (EMS)   Our providers can only care for you at:   [facility] or [facility]. Even if you have someone take you or you drive yourself we can only care for you in a [address] [facility] facility. Our providers are not setup at the other healthcare locations!

## 2023-02-13 ENCOUNTER — PROCEDURE VISIT (OUTPATIENT)
Dept: CARDIOLOGY CLINIC | Age: 73
End: 2023-02-13

## 2023-02-13 DIAGNOSIS — R07.89 OTHER CHEST PAIN: Primary | ICD-10-CM

## 2023-02-13 DIAGNOSIS — I25.10 ASCVD (ARTERIOSCLEROTIC CARDIOVASCULAR DISEASE): ICD-10-CM

## 2023-02-13 LAB
LV EF: 51 %
LVEF MODALITY: NORMAL

## 2023-02-14 ENCOUNTER — TELEPHONE (OUTPATIENT)
Dept: CARDIOLOGY CLINIC | Age: 73
End: 2023-02-14

## 2023-02-14 NOTE — TELEPHONE ENCOUNTER
Small sized defect of mild severity which is persistent involving lateral    wall of myocardium. Abnormal Lexiscan nuclear scintigraphic study suggestive    of abnormal myocardial perfusion. Gated images demonstrate normal left    ventricular systolic function with EF of 51 %. Nuclear scintigraphy    demonstartes lateral wall infarct. Recommendation    Suggest office visit to discuss results or schedule cardiac catheterization    . Pt would like to go ahead and get heart cath scheduled, told him a nurse would be giving him a call.

## 2023-02-15 NOTE — TELEPHONE ENCOUNTER
Patient scheduled for [facility] 2/28/23 @ 8:00 with arrival time of 6:00.   He will come for PPW 2/23 @ 10:30

## 2023-02-21 ENCOUNTER — HOSPITAL ENCOUNTER (OUTPATIENT)
Dept: MRI IMAGING | Age: 73
Discharge: HOME | End: 2023-02-21
Payer: MEDICARE

## 2023-02-21 DIAGNOSIS — R41.3 MEMORY DIFFICULTIES: ICD-10-CM

## 2023-02-21 PROCEDURE — 70551 MRI BRAIN STEM W/O DYE: CPT

## 2023-02-23 ENCOUNTER — HOSPITAL ENCOUNTER (OUTPATIENT)
Age: 73
Discharge: HOME | End: 2023-02-23
Payer: MEDICARE

## 2023-02-23 ENCOUNTER — NURSE ONLY (OUTPATIENT)
Dept: CARDIOLOGY CLINIC | Age: 73
End: 2023-02-23
Payer: MEDICARE

## 2023-02-23 ENCOUNTER — HOSPITAL ENCOUNTER (OUTPATIENT)
Dept: GENERAL RADIOLOGY | Age: 73
Discharge: HOME | End: 2023-02-23
Payer: MEDICARE

## 2023-02-23 DIAGNOSIS — Z01.810 PRE-OPERATIVE CARDIOVASCULAR EXAMINATION: ICD-10-CM

## 2023-02-23 PROCEDURE — 71046 X-RAY EXAM CHEST 2 VIEWS: CPT

## 2023-02-23 PROCEDURE — 99211 OFF/OP EST MAY X REQ PHY/QHP: CPT | Performed by: INTERNAL MEDICINE

## 2023-02-23 NOTE — PROGRESS NOTES
Patient was here in office & educated on [facility] for Dx: Anahi Morrell. Procedure is scheduled for 2/28/23 @ 8 AM, w/arrival @ 6 AM, @ [facility]. Pre-admission order was given to patient for CXR, which is due 2/23/23 @ [address]. Procedure and risks were explained to patient. Consent forms were signed. Instructions were given to patient to remain NPO after midnight the night before procedure. Patient may take morning meds the morning of procedure with small amount of water. Patient was notified that procedure could be delayed due to an emergency. Patient voiced understanding.  Copies of consent, pre-testing orders, & instructions scanned into media

## 2023-02-27 ENCOUNTER — TELEPHONE (OUTPATIENT)
Dept: CARDIOLOGY CLINIC | Age: 73
End: 2023-02-27

## 2023-02-28 ENCOUNTER — HOSPITAL ENCOUNTER (OUTPATIENT)
Dept: CARDIAC CATH/INVASIVE PROCEDURES | Age: 73
Discharge: HOME | End: 2023-02-28
Attending: INTERNAL MEDICINE | Admitting: INTERNAL MEDICINE
Payer: MEDICARE

## 2023-02-28 VITALS
TEMPERATURE: 97 F | DIASTOLIC BLOOD PRESSURE: 75 MMHG | OXYGEN SATURATION: 97 % | SYSTOLIC BLOOD PRESSURE: 134 MMHG | HEIGHT: 71 IN | RESPIRATION RATE: 19 BRPM | WEIGHT: 226 LBS | BODY MASS INDEX: 31.64 KG/M2 | HEART RATE: 80 BPM

## 2023-02-28 PROBLEM — I25.10 ASCVD (ARTERIOSCLEROTIC CARDIOVASCULAR DISEASE): Status: ACTIVE | Noted: 2023-02-28

## 2023-02-28 PROCEDURE — 2500000003 HC RX 250 WO HCPCS

## 2023-02-28 PROCEDURE — 93459 L HRT ART/GRFT ANGIO: CPT | Performed by: INTERNAL MEDICINE

## 2023-02-28 PROCEDURE — 6370000000 HC RX 637 (ALT 250 FOR IP): Performed by: INTERNAL MEDICINE

## 2023-02-28 PROCEDURE — 93452 LEFT HRT CATH W/VENTRCLGRPHY: CPT

## 2023-02-28 PROCEDURE — 2709999900 HC NON-CHARGEABLE SUPPLY

## 2023-02-28 PROCEDURE — C1894 INTRO/SHEATH, NON-LASER: HCPCS

## 2023-02-28 PROCEDURE — 6360000002 HC RX W HCPCS

## 2023-02-28 PROCEDURE — C1769 GUIDE WIRE: HCPCS

## 2023-02-28 PROCEDURE — 6360000004 HC RX CONTRAST MEDICATION

## 2023-02-28 PROCEDURE — 2580000003 HC RX 258: Performed by: INTERNAL MEDICINE

## 2023-02-28 RX ORDER — DIAZEPAM 5 MG/1
5 TABLET ORAL ONCE
Status: COMPLETED | OUTPATIENT
Start: 2023-02-28 | End: 2023-02-28

## 2023-02-28 RX ORDER — SODIUM CHLORIDE 9 MG/ML
INJECTION, SOLUTION INTRAVENOUS CONTINUOUS
Status: DISCONTINUED | OUTPATIENT
Start: 2023-02-28 | End: 2023-02-28 | Stop reason: HOSPADM

## 2023-02-28 RX ORDER — ACETAMINOPHEN 325 MG/1
650 TABLET ORAL EVERY 4 HOURS PRN
Status: DISCONTINUED | OUTPATIENT
Start: 2023-02-28 | End: 2023-02-28 | Stop reason: HOSPADM

## 2023-02-28 RX ORDER — SODIUM CHLORIDE 0.9 % (FLUSH) 0.9 %
5-40 SYRINGE (ML) INJECTION PRN
Status: DISCONTINUED | OUTPATIENT
Start: 2023-02-28 | End: 2023-02-28 | Stop reason: HOSPADM

## 2023-02-28 RX ORDER — SODIUM CHLORIDE 0.9 % (FLUSH) 0.9 %
5-40 SYRINGE (ML) INJECTION EVERY 12 HOURS SCHEDULED
Status: DISCONTINUED | OUTPATIENT
Start: 2023-02-28 | End: 2023-02-28 | Stop reason: HOSPADM

## 2023-02-28 RX ORDER — DIPHENHYDRAMINE HCL 25 MG
25 TABLET ORAL ONCE
Status: COMPLETED | OUTPATIENT
Start: 2023-02-28 | End: 2023-02-28

## 2023-02-28 RX ORDER — SODIUM CHLORIDE 9 MG/ML
INJECTION, SOLUTION INTRAVENOUS PRN
Status: DISCONTINUED | OUTPATIENT
Start: 2023-02-28 | End: 2023-02-28 | Stop reason: HOSPADM

## 2023-02-28 RX ADMIN — DIAZEPAM 5 MG: 5 TABLET ORAL at 06:41

## 2023-02-28 RX ADMIN — DIPHENHYDRAMINE HYDROCHLORIDE 25 MG: 25 TABLET ORAL at 06:41

## 2023-02-28 RX ADMIN — SODIUM CHLORIDE: 9 INJECTION, SOLUTION INTRAVENOUS at 06:40

## 2023-02-28 NOTE — FLOWSHEET NOTE
Pt to pt  in wheelchair per volunteer for d/c home in private vehicle with family.  Left groin free of bleeding/hematoma at time of d/c

## 2023-02-28 NOTE — FLOWSHEET NOTE
Discharge instructions reviewed with patient per BONNY Peterson. Voices understanding. Ambulated without difficulty.

## 2023-02-28 NOTE — DISCHARGE INSTRUCTIONS
Discharge Home Instuctions  Name:Edward Hurtado  :1950    Your instructions:    Shower only for the first 5 days, NO TUB BATHS. Wash procedure site with mild soap, rinse and pat dry. Do not rub over the procedure site for two (2) days. Remove dressing and replace with a band-aid in 2 days. Site observations-Observe the area for bleeding or hematoma (lump under the skin caused by bleeding.)      A. Painless bruising is normal.  B. If a firmness/swelling seems to be increasing or there is bright red bleeding from site       (hold pressure over procedure site) and  CALL 911 IMMEDIATELY. What to do after you leave the hospital:    Recommended activity: no lifting, Driving, or Strenuous exercise for 3 days. DO NOT lift more than 10lbs. For your procedure you may have been given a sedative to help you relax. This drug will make you sleepy. It is usually given in a vein (by IV). Don't do anything for 24 hours that requires attention to detail. It takes time for the medicine effects to completely wear off. Do not sign any legally binding contracts or documents over the next 24 hours. For your safety, you should not drive or operate any machinery that could be dangerous until the medicine wears off and you can think clearly and react easily. Follow-up with physician in 7-10 days. Take your medication as ordered.       If you have any questions, you may call [phone] or your physicians office

## 2023-02-28 NOTE — H&P
Yannick Irby is a 67 y.o. male who was seen today for management of  PVD           Coronary artery bypass x4 placing vein graft to the diagonal coronary  artery, vein graft to the distal right coronary artery, vein graft to the  obtuse marginal coronary artery and left internal mammary artery to the left  anterior descending                         :   Patient is here for    - Coronary artery disease, does not have chest pain. Patient is  compliant with prescribed medicines. - Hypertension,is  well controlled, pt is  compliant with medicines  - Hyperlipidimea, lipids are in acceptable range.  Pt  is  compliant with medicines  - PVD  stable                 The patient has been fatigued same symptoms pre CABG     Manus Altaf has the following history recorded in care path:       Patient Active Problem List     Diagnosis Date Noted    Chronic renal disease, stage III ([facility] [address] [[id number]] 08/22/2022    Hyperlipidemia      GERD (gastroesophageal reflux disease) 08/28/2020    Hyperglycemia 08/28/2020    Fatigue 09/17/2019    Shortness of breath 09/17/2019    COPD (chronic obstructive pulmonary disease) ([facility])      Lumbosacral spondylosis      Primary erectile dysfunction      Peripheral vascular disease, unspecified ([address] 03/26/2019    Atherosclerotic heart disease of native coronary artery with other forms of angina pectoris ([address] 06/29/2018    H/O cardiovascular stress test 05/29/2014    S/P CABG x 4 05/05/2014    History of percutaneous left heart catheterization 05/03/2014    Essential hypertension 05/02/2014    Tobacco abuse 05/02/2014    NSTEMI (non-ST elevated myocardial infarction) ([address] 05/02/2014      Current Facility-Administered Medications          Current Outpatient Medications   Medication Sig Dispense Refill    Evolocumab (REPATHA SURECLICK) 349 MG/ML SOAJ Inject 140 mg into the skin every 14 days 2 mL 5    albuterol sulfate HFA (PROVENTIL;VENTOLIN;PROAIR) 108 (90 Base) MCG/ACT inhaler Inhale 2 puffs into the lungs 4 times daily as needed for Wheezing 3 each 1    amLODIPine (NORVASC) 5 MG tablet TAKE 1 TABLET BY MOUTH ONCE DAILY IN THE MORNING 90 tablet 1    carvedilol (COREG) 12.5 MG tablet TAKE 1 TABLET BY MOUTH TWICE DAILY WITH MEALS 180 tablet 1    clopidogrel (PLAVIX) 75 MG tablet Take 1/2 (one-half) tablet by mouth once daily 45 tablet 1    lisinopril (PRINIVIL;ZESTRIL) 20 MG tablet Take 1 tablet by mouth once daily 90 tablet 1    tiotropium-olodaterol (STIOLTO RESPIMAT) 2.5-2.5 MCG/ACT AERS Inhale 2 puffs into the lungs daily 3 each 1    aspirin 81 MG tablet Take 81 mg by mouth nightly Over The Counter        sildenafil (VIAGRA) 100 MG tablet Take 1 tablet by mouth as needed for Erectile Dysfunction (Patient not taking: Reported on 2/8/2023) 30 tablet 1      No current facility-administered medications for this visit.          Allergies: Statins, Lansoprazole, Lipitor [atorvastatin calcium], and Spiriva handihaler [tiotropium bromide monohydrate]  Past Medical History        Past Medical History:   Diagnosis Date    Acid reflux      Basal Cell Skin Cancer Excised Nose 1992     Basal Cell Skin Cancer Excised Nose    CAD (coronary artery disease)       Sees Dr. Cleopatra Marcial    COP[facility]chronic obstructive pulmonary disease) [facility]      Essential hypertension      H/O cardiovascular stress test 5/29/2014     cardiolite-normal, no ischemia, EF 60%    H/O cardiovascular stress test 1/18/2015     cardiolite-normal, EF51%    H/O cardiovascular stress test 02/12/2018     EF60% normal study    H/O Doppler lower arterial ultrasound 04/22/2019     No significant occlusive arterial disease, Patent right femoral popliteal bypass graft with biphasic waveforms, bilateral abis show mild peripheral arterial disease    H/O Doppler ultrasound 6/1/2016     Lower Ext Arterial- normal study, mild stenosis in RLE    H/O echocardiogram 5/28/2014     EF35-45% mild MR, mild AV calcification    H/O echocardiogram 09/25/2019 EF 99-11%, grade I diastolic dysfunction, No significant valvular disease, no pericardial effusion     Heart attack ([facility] [address] 2014    History of cardiac cath 02/12/2019     PATENT RENAL ARTERIES DISTAL ABD AORTA ATHEROSCLEROTIC, ANEYRYSMAL, RCIA PARENT, REIA  PATENT, RT SFA OCCLUDED, DISTALLY RECONSTITUTES, 3 V RUNOFF,  LT MARIA ISABEL PATENT, CHRIS 70% TO 0%,  LT SFA 80% TO 0% WITH ATHRECTOMY TO PTA, LT POP PATENT,  3 V run off                        History of nuclear stress test 09/25/2019     EF 51%, Normal    History of percutaneous left heart catheterization 5/3/2014     RCA 99% distal stenosis EF55%    History of Peripheral Angiogram 03/07/2019     RCIA PATENT, REIA CALCIFIED 70% STENOSIS, RSFA OCCLUDED NO STUMP, PFA  PATENT FILL DISTAL SFA WITH COLLATERLS, RT POP PATENT, 3 V RUN OFF    Hyperlipidemia      Lumbosacral spondylosis      Primary erectile dysfunction      S/P CABG x 4 2014     vein graft diagonal coronary artery,vein graft distal RCA,vein graft obtuse marginal coronary artery and left internal mammary artery to the left anterior descending    Shortness of breath on exertion      Teeth missing       Upper And Lower    Wears dentures       Full Upper    Wears glasses           Past Surgical History         Past Surgical History:   Procedure Laterality Date    CARDIAC SURGERY   2014     CABG (4 Bypasses)    COLONOSCOPY   Last Done In 2019     Polyps Removed In Past    DENTAL SURGERY         Teeth Extracted In Past    FEMORAL BYPASS Right 3/25/2019     RIGHT FEMORAL POPLITEAL BYPASS performed by John Cohn MD at [address] Left 02/12/2019     \"Stent\"    SKIN CANCER EXCISION   1992     Basal Cell Skin Cancer Excised Nose    TONSILLECTOMY AND ADENOIDECTOMY   65's          As reviewed   Family History         Family History   Adopted: Yes   Problem Relation Age of Onset    Heart Disease Mother      Hypertension Mother      Diabetes Mother      Lung Cancer Sister      No Known Problems Sister      No Known Problems Sister      Cancer Brother           unknown CA    COPD Brother      No Known Problems Brother      No Known Problems Brother           Social History            Tobacco Use    Smoking status: Former       Packs/day: 1.00       Years: 55.00       Pack years: 55.00       Types: Cigarettes, Pipe, Cigars       Start date:        Quit date:        Years since quittin.1    Smokeless tobacco: Former       Types: Chew       Quit date:    Substance Use Topics    Alcohol use: No       Comment: Caffeine: 3-4 cups of coffee daily       Review of Systems:    Constitutional: Negative for diaphoresis and fatigue  Psychological:Negative for anxiety or depression  HENT: Negative for headaches, nasal congestion, sinus pain or vertigo  Eyes: Negative for visual disturbance. Endocrine: Negative for polydipsia/polyuria  Respiratory: Negative for shortness of breath  Cardiovascular: Negative for chest pain, dyspnea on exertion, claudication, edema, irregular heartbeat, murmur, palpitations or shortness of breath  Gastrointestinal: Negative for abdominal pain or heartburn  Genito-Urinary: Negative for urinary frequency/urgency  Musculoskeletal: Negative for muscle pain, muscular weakness, negative for pain in arm and leg or swelling in foot and leg  Neurological: Negative for dizziness, headaches, memory loss, numbness/tingling, visual changes, syncope  Dermatological: Negative for rash     Objective:  BP (!) 150/80 (Site: Left Upper Arm, Position: Sitting, Cuff Size: Large Adult)   Pulse 68   Ht 5' 11\" (1.803 m)   Wt 226 lb (102.5 kg)   BMI 31.52 kg/m²       Wt Readings from Last 3 Encounters:   23 226 lb (102.5 kg)   23 222 lb (100.7 kg)   22 223 lb 12.8 oz (101.5 kg)      Body mass index is 31.52 kg/m². GENERAL - Alert, oriented, pleasant, in no apparent distress. EYES: No jaundice, no conjunctival pallor. SKIN: It is warm & dry. No rashes.  No Echhymosis    HEENT - No clinically significant abnormalities seen. Neck - Supple. No jugular venous distention noted. No carotid bruits. Cardiovascular - Normal S1 and S2 without obvious murmur or gallop. Extremities - No cyanosis, clubbing, or significant edema. Pulmonary - No respiratory distress. No wheezes or rales. Abdomen - No masses, tenderness, or organomegaly. Musculoskeletal - No significant edema. No joint deformities. No muscle wasting. Neurologic - Cranial nerves II through XII are grossly intact. There were no gross focal neurologic abnormalities.      Lab Review   No results found for: CKTOTAL, CKMB, CKMBINDEX, TROPONINT  BNP:          Lab Results   Component Value Date/Time     BNP 22 05/02/2014 07:30 AM      PT/INR:          Lab Results   Component Value Date     INR 0.97 03/18/2019            Lab Results   Component Value Date     LABA1C 5.9 03/01/2021     LABA1C 6.3 08/28/2020            Lab Results   Component Value Date     WBC 6.1 02/07/2023     HCT 45.1 02/07/2023     MCV 94.5 02/07/2023      02/07/2023            Lab Results   Component Value Date     CHOL 158 02/07/2023     TRIG 96 02/07/2023     HDL 47 02/07/2023     LDLCALC 92 02/07/2023     LDLDIRECT 155 (H) 05/03/2014            Lab Results   Component Value Date     ALT 13 02/07/2023     AST 18 02/07/2023      BMP:          Lab Results   Component Value Date/Time      02/07/2023 08:04 AM     K 4.3 02/07/2023 08:04 AM      02/07/2023 08:04 AM     CO2 26 02/07/2023 08:04 AM     BUN 17 02/07/2023 08:04 AM     CREATININE 1.0 02/07/2023 08:04 AM      CMP:         Lab Results   Component Value Date/Time      02/07/2023 08:04 AM     K 4.3 02/07/2023 08:04 AM      02/07/2023 08:04 AM     CO2 26 02/07/2023 08:04 AM     BUN 17 02/07/2023 08:04 AM     PROT 7.1 02/07/2023 08:04 AM     PROT 7.1 01/25/2011 08:36 AM      TSH:          Lab Results   Component Value Date/Time     TSH 1.36 02/07/2023 08:04 AM     TSH 0.883 06/01/2013 07:38 AM               Impression:    1. Essential hypertension               Patient Active Problem List   Diagnosis Code    Essential hypertension I10    Tobacco abuse Z72.0    NSTEMI (non-ST elevated myocardial infarction) ([facility] I21.4    S/P CABG x 4 Z95.1    History of percutaneous left heart catheterization Z98.890    H/O cardiovascular stress test Z92.89    Atherosclerotic heart disease of native coronary artery with other forms of angina pectoris (H[facility]) I25.118    Peripheral vascular disease, unspecified ([facility]) I73.9    COPD (chronic obstructive pulmonary disease) ([facility] J44.9    Lumbosacral spondylosis M47.817    Primary erectile dysfunction N52.9    Fatigue R53.83    Shortness of breath R06.02    GERD (gastroesophageal reflux disease) K21.9    Hyperglycemia R73.9    Hyperlipidemia E78.5    Chronic renal disease, stage III ([facility] [[id number]] N18.30         Assessment & Plan:         - PVD   Had PTA   Of LSFA and Rt FP bypass , per art duplex per CTS  Patient is stable we will continue        -     CORONARY ARTERY DISEASE:  symptomatic     All available  tests in chart reviewed. Management discussed . Testing ordered   stress cardiolite        On plavix compliant we will continue                         -  Hypertension: Patients blood pressure is normal. Patient is advised about low sodium diet. Present medical regimen will not be changed. On prinivil compliant blood pressure is well controlled           -  LIPID MANAGEMENT:  Available lipid  lab data reviewed  and patient was given dietary advice. NCEP- ATP III guidelines reviewed with patient.     -   Changes  in medicines made: No     On repatha compliant we will check the LDL 92 yesterday                    -COPD patient stable has no issues

## 2023-02-28 NOTE — OP NOTE
Operative Note      Patient: Che Guerra  YOB: 1950  MRN: [id number]        Procedure  Cardiac catheter and selective coronary angiography and ventriculography  Bypass injections    Indication  Abnormal Cardiolite stress test    Catheter use  JL 4 and JR4 and a L1  EBL 10 cc    Findings the left main is a short tubular vessel is about 30% ostial stenosis circumflex is occluded the LAD has about 50% mid stenosis which is unchanged from the previous cath in 2014  The right coronary artery is distally occluded diffusely diseased vessel  The saphenous vein graft to the OM saphenous vein graft to the RCA are patent however the PL branch has diffuse disease proximally however it is a small caliber vessel    The saphenous vein graft to the diagonal is patent retrogradely filling the LAD the LAD itself as mentioned above is a 50% stenosis which is unchanged from the previous cath  LIMA to the LAD never matured and is occluded distally  Global EF is normal    Assessment and plan  Patient secondary to vessel disease however the bypasses are patent  For LIMA I will continue with medical therapy given the patient has no symptoms further management depend on his outpatient course  As mentioned the patient has residual disease in the LAD and the PL branches however given that he is asymptomatic will be managed medically for now

## 2023-03-07 ENCOUNTER — OFFICE VISIT (OUTPATIENT)
Dept: CARDIOLOGY CLINIC | Age: 73
End: 2023-03-07
Payer: MEDICARE

## 2023-03-07 VITALS
HEART RATE: 74 BPM | HEIGHT: 71 IN | DIASTOLIC BLOOD PRESSURE: 72 MMHG | SYSTOLIC BLOOD PRESSURE: 124 MMHG | WEIGHT: 222.6 LBS | BODY MASS INDEX: 31.16 KG/M2

## 2023-03-07 DIAGNOSIS — I25.10 ASCVD (ARTERIOSCLEROTIC CARDIOVASCULAR DISEASE): Primary | ICD-10-CM

## 2023-03-07 PROCEDURE — G8484 FLU IMMUNIZE NO ADMIN: HCPCS | Performed by: INTERNAL MEDICINE

## 2023-03-07 PROCEDURE — 1123F ACP DISCUSS/DSCN MKR DOCD: CPT | Performed by: INTERNAL MEDICINE

## 2023-03-07 PROCEDURE — 3017F COLORECTAL CA SCREEN DOC REV: CPT | Performed by: INTERNAL MEDICINE

## 2023-03-07 PROCEDURE — G8417 CALC BMI ABV UP PARAM F/U: HCPCS | Performed by: INTERNAL MEDICINE

## 2023-03-07 PROCEDURE — 1036F TOBACCO NON-USER: CPT | Performed by: INTERNAL MEDICINE

## 2023-03-07 PROCEDURE — 99214 OFFICE O/P EST MOD 30 MIN: CPT | Performed by: INTERNAL MEDICINE

## 2023-03-07 PROCEDURE — 3074F SYST BP LT 130 MM HG: CPT | Performed by: INTERNAL MEDICINE

## 2023-03-07 PROCEDURE — 3078F DIAST BP <80 MM HG: CPT | Performed by: INTERNAL MEDICINE

## 2023-03-07 PROCEDURE — G8427 DOCREV CUR MEDS BY ELIG CLIN: HCPCS | Performed by: INTERNAL MEDICINE

## 2023-03-07 NOTE — PROGRESS NOTES
CARDIOLOGY NOTE      3/7/2023    RE: Moraima Silva  (1950)                               TO:  Dr. Isha Orourke MD            CHIEF Zuleima Quesada is a 75 y.o. male who was seen today for management of  PVD                                    :   Patient is here for    - Coronary artery disease, does not have chest pain. Patient is  compliant with prescribed medicines. - Hypertension,is  well controlled, pt is  compliant with medicines  - Hyperlipidimea, lipids are in acceptable range.  Pt  is  compliant with medicines  - PVD  stable                 The patient has no cardiac compalins  Moraima Silva has the following history recorded in care path:  Patient Active Problem List    Diagnosis Date Noted    ASCVD (arteriosclerotic cardiovascular disease) 02/28/2023    Chronic renal disease, stage III [facility]) [[id number]] 08/22/2022    Hyperlipidemia     GERD (gastroesophageal reflux disease) 08/28/2020    Hyperglycemia 08/28/2020    Fatigue 09/17/2019    Shortness of breath 09/17/2019    COPD (chronic obstructive pulmonary disease) ([facility])     Lumbosacral spondylosis     Primary erectile dysfunction     Peripheral vascular disease, unspecified ([facility] 03/26/2019    Atherosclerotic heart disease of native coronary artery with other forms of angina pectoris ([address] 06/29/2018    H/O cardiovascular stress test 05/29/2014    S/P CABG x 4 05/05/2014    History of percutaneous left heart catheterization 05/03/2014    Essential hypertension 05/02/2014    Tobacco abuse 05/02/2014    NSTEMI (non-ST elevated myocardial infarction) ([facility] 05/02/2014     Current Outpatient Medications   Medication Sig Dispense Refill    Evolocumab (REPATHA SURECLICK) 106 MG/ML SOAJ Inject 140 mg into the skin every 14 days 2 mL 5    albuterol sulfate HFA (PROVENTIL;VENTOLIN;PROAIR) 108 (90 Base) MCG/ACT inhaler Inhale 2 puffs into the lungs 4 times daily as needed for Wheezing 3 each 1    amLODIPine (NORVASC) 5 MG tablet TAKE 1 TABLET BY MOUTH ONCE DAILY IN THE MORNING 90 tablet 1    carvedilol (COREG) 12.5 MG tablet TAKE 1 TABLET BY MOUTH TWICE DAILY WITH MEALS 180 tablet 1    clopidogrel (PLAVIX) 75 MG tablet Take 1/2 (one-half) tablet by mouth once daily 45 tablet 1    lisinopril (PRINIVIL;ZESTRIL) 20 MG tablet Take 1 tablet by mouth once daily 90 tablet 1    tiotropium-olodaterol (STIOLTO RESPIMAT) 2.5-2.5 MCG/ACT AERS Inhale 2 puffs into the lungs daily 3 each 1    aspirin 81 MG tablet Take 81 mg by mouth nightly Over The Counter      sildenafil (VIAGRA) 100 MG tablet Take 1 tablet by mouth as needed for Erectile Dysfunction (Patient not taking: No sig reported) 30 tablet 1     No current facility-administered medications for this visit.      Allergies: Statins, Lansoprazole, Lipitor [atorvastatin calcium], and Spiriva handihaler [tiotropium bromide monohydrate]  Past Medical History:   Diagnosis Date    Acid reflux     Basal Cell Skin Cancer Excised Nose 1992    Basal Cell Skin Cancer Excised Nose    CAD (coronary artery disease)     Sees Dr. Praveen Edge    COPD [facility]ch[facility] obstructive [facility] disease) [facility]     Essential hypertension     H/O cardiovascular stress test 5/29/2014    cardiolite-normal, no ischemia, EF 60%    H/O cardiovascular stress test 1/18/2015    cardiolite-normal, EF51%    H/O cardiovascular stress test 02/12/2018    EF60% normal study    H/O Doppler lower arterial ultrasound 04/22/2019    No significant occlusive arterial disease, Patent right femoral popliteal bypass graft with biphasic waveforms, bilateral abis show mild peripheral arterial disease    H/O Doppler ultrasound 6/1/2016    Lower Ext Arterial- normal study, mild stenosis in RLE    H/O echocardiogram 5/28/2014    EF35-45% mild MR, mild AV calcification    H/O echocardiogram 09/25/2019    EF 85-12%, grade I diastolic dysfunction, No significant valvular disease, no pericardial effusion     Heart attack ([facility] 2014    History of cardiac cath 02/12/2019    PATENT RENAL ARTERIES DISTAL ABD AORTA ATHEROSCLEROTIC, ANEYRYSMAL, RCIA PARENT, REIA  PATENT, RT SFA OCCLUDED, DISTALLY RECONSTITUTES, 3 V RUNOFF,  LT MARIA ISABEL PATENT, CHRIS 70% TO 0%,  LT SFA 80% TO 0% WITH ATHRECTOMY TO PTA, LT POP PATENT,  3 V run off                        History of nuclear stress test 09/25/2019    EF 51%, Normal    History of percutaneous left heart catheterization 5/3/2014    RCA 99% distal stenosis EF55%    History of Peripheral Angiogram 03/07/2019    RCIA PATENT, REIA CALCIFIED 70% STENOSIS, RSFA OCCLUDED NO STUMP, PFA  PATENT FILL DISTAL SFA WITH COLLATERLS, RT POP PATENT, 3 V RUN OFF    Hyperlipidemia     Lumbosacral spondylosis     Primary erectile dysfunction     S/P CABG x 4 2014    vein graft diagonal coronary artery,vein graft distal RCA,vein graft obtuse marginal coronary artery and left internal mammary artery to the left anterior descending    Shortness of breath on exertion     Teeth missing     Upper And Lower    Wears dentures     Full Upper    Wears glasses      Past Surgical History:   Procedure Laterality Date    CARDIAC SURGERY  2014    CABG (4 Bypasses)    COLONOSCOPY  Last Done In 2019    Polyps Removed In Past    DENTAL SURGERY      Teeth Extracted In Past    FEMORAL BYPASS Right 3/25/2019    RIGHT FEMORAL POPLITEAL BYPASS performed by Megan Boucher MD [address] 02/12/2019    \"Stent\"    SKIN CANCER EXCISION  1992    Basal Cell Skin Cancer Excised Nose    TONSILLECTOMY AND ADENOIDECTOMY  1950's      As reviewed   Family History   Adopted: Yes   Problem Relation Age of Onset    Heart Disease Mother     Hypertension Mother     Diabetes Mother     Lung Cancer Sister     No Known Problems Sister     No Known Problems Sister     Cancer Brother         unknown CA    COPD Brother     No Known Problems Brother     No Known Problems Brother      Social History     Tobacco Use    Smoking status: Former     Packs/day: 1.00     Years: 55.00     Pack years: 55.00     Types: Cigarettes, Pipe, Cigars     Start date: 64     Quit date:      Years since quittin.1    Smokeless tobacco: Former     Types: Chew     Quit date:    Substance Use Topics    Alcohol use: No     Comment: Caffeine: 3-4 cups of coffee daily       Review of Systems:    Constitutional: Negative for diaphoresis and fatigue  Psychological:Negative for anxiety or depression  HENT: Negative for headaches, nasal congestion, sinus pain or vertigo  Eyes: Negative for visual disturbance. Endocrine: Negative for polydipsia/polyuria  Respiratory: Negative for shortness of breath  Cardiovascular: Negative for chest pain, dyspnea on exertion, claudication, edema, irregular heartbeat, murmur, palpitations or shortness of breath  Gastrointestinal: Negative for abdominal pain or heartburn  Genito-Urinary: Negative for urinary frequency/urgency  Musculoskeletal: Negative for muscle pain, muscular weakness, negative for pain in arm and leg or swelling in foot and leg  Neurological: Negative for dizziness, headaches, memory loss, numbness/tingling, visual changes, syncope  Dermatological: Negative for rash    Objective:  /72   Pulse 74   Ht 5' 11\" (1.803 m)   Wt 222 lb 9.6 oz (101 kg)   BMI 31.05 kg/m²   Wt Readings from Last 3 Encounters:   23 222 lb 9.6 oz (101 kg)   23 226 lb (102.5 kg)   23 226 lb (102.5 kg)     Body mass index is 31.05 kg/m². GENERAL - Alert, oriented, pleasant, in no apparent distress. EYES: No jaundice, no conjunctival pallor. SKIN: It is warm & dry. No rashes. No Echhymosis    HEENT - No clinically significant abnormalities seen. Neck - Supple. No jugular venous distention noted. No carotid bruits. Cardiovascular - Normal S1 and S2 without obvious murmur or gallop. Extremities - No cyanosis, clubbing, or significant edema. Pulmonary - No respiratory distress. No wheezes or rales.     Abdomen - No masses, tenderness, or organomegaly. Musculoskeletal - No significant edema. No joint deformities. No muscle wasting. Neurologic - Cranial nerves II through XII are grossly intact. There were no gross focal neurologic abnormalities. Lab Review   No results found for: CKTOTAL, CKMB, CKMBINDEX, TROPONINT  BNP:    Lab Results   Component Value Date/Time    BNP 22 05/02/2014 07:30 AM     PT/INR:    Lab Results   Component Value Date    INR 0.97 03/18/2019     Lab Results   Component Value Date    LABA1C 5.9 03/01/2021    LABA1C 6.3 08/28/2020     Lab Results   Component Value Date    WBC 6.1 02/07/2023    HCT 45.1 02/07/2023    MCV 94.5 02/07/2023     02/07/2023     Lab Results   Component Value Date    CHOL 158 02/07/2023    TRIG 96 02/07/2023    HDL 47 02/07/2023    LDLCALC 92 02/07/2023    LDLDIRECT 155 (H) 05/03/2014     Lab Results   Component Value Date    ALT 13 02/07/2023    AST 18 02/07/2023     BMP:    Lab Results   Component Value Date/Time     02/07/2023 08:04 AM    K 4.3 02/07/2023 08:04 AM     02/07/2023 08:04 AM    CO2 26 02/07/2023 08:04 AM    BUN 17 02/07/2023 08:04 AM    CREATININE 1.0 02/07/2023 08:04 AM     CMP:   Lab Results   Component Value Date/Time     02/07/2023 08:04 AM    K 4.3 02/07/2023 08:04 AM     02/07/2023 08:04 AM    CO2 26 02/07/2023 08:04 AM    BUN 17 02/07/2023 08:04 AM    PROT 7.1 02/07/2023 08:04 AM    PROT 7.1 01/25/2011 08:36 AM     TSH:    Lab Results   Component Value Date/Time    TSH 1.36 02/07/2023 08:04 AM    TSHHS 0.883 06/01/2013 07:38 AM           Impression:    No diagnosis found.        Patient Active Problem List   Diagnosis Code    Essential hypertension I10    Tobacco abuse Z72.0    NSTEMI (non-ST elevated myocardial infarction) ([facility] I21.4    S/P CABG x 4 Z95.1    History of percutaneous left heart catheterization Z98.890    H/O cardiovascular stress test Z92.89    Atherosclerotic heart disease of native coronary artery with other forms of angina pectoris ([facility] I25.118    Peripheral vascular disease, unspecified ([email or web address] I73.9    COPD (chronic obstructive pulmonary disease) ([facility]) J44.9    Lumbosacral spondylosis M47.817    Primary erectile dysfunction N52.9    Fatigue R53.83    Shortness of breath R06.02    GERD (gastroesophageal reflux disease) K21.9    Hyperglycemia R73.9    Hyperlipidemia E78.5    Chronic renal disease, stage III ([facility] [[id number]] N18.30    ASCVD (arteriosclerotic cardiovascular disease) I25.10       Assessment & Plan:       - PVD   Had PTA   Of LSFA and Rt FP bypass , per art duplex per CTS  Patient is stable we will continue      -     CORONARY ARTERY DISEASE:  symptomatic     All available  tests in chart reviewed. Management discussed . Testing ordered   stress cardiolite        On plavix compliant we will continue            2023 [facility]          The saphenous vein graft to the diagonal is patent retrogradely filling the LAD the LAD itself as mentioned above is a 50% stenosis which is unchanged from the previous cath  LIMA to the LAD never matured and is occluded distally  Global EF is normal     Assessment and plan  Patient secondary to vessel disease however the bypasses are patent  For LIMA I will continue with medical therapy given the patient has no symptoms further management depend on his outpatient course  As mentioned the patient has residual disease in the LAD and the PL branches however given that he is asymptomatic will be managed medically for now    -  Hypertension: Patients blood pressure is normal. Patient is advised about low sodium diet. Present medical regimen will not be changed. On prinivil compliant blood pressure is well controlled        -  LIPID MANAGEMENT:  Available lipid  lab data reviewed  and patient was given dietary advice. NCEP- ATP III guidelines reviewed with patient.     -   Changes  in medicines made: No     On repatha compliant we will check the LDL 92 yesterday                    -COPD patient stable has no issues               Segun Parish MA  [facility]

## 2023-03-08 ENCOUNTER — OFFICE VISIT (OUTPATIENT)
Dept: FAMILY MEDICINE CLINIC | Age: 73
End: 2023-03-08
Payer: MEDICARE

## 2023-03-08 VITALS
WEIGHT: 221.7 LBS | RESPIRATION RATE: 16 BRPM | OXYGEN SATURATION: 95 % | BODY MASS INDEX: 31.04 KG/M2 | DIASTOLIC BLOOD PRESSURE: 84 MMHG | SYSTOLIC BLOOD PRESSURE: 122 MMHG | HEART RATE: 62 BPM | HEIGHT: 71 IN

## 2023-03-08 DIAGNOSIS — N18.30 STAGE 3 CHRONIC KIDNEY DISEASE, UNSPECIFIED WHETHER STAGE 3A OR 3B CKD (HCC): ICD-10-CM

## 2023-03-08 DIAGNOSIS — I10 ESSENTIAL HYPERTENSION: ICD-10-CM

## 2023-03-08 DIAGNOSIS — J43.9 PULMONARY EMPHYSEMA, UNSPECIFIED EMPHYSEMA TYPE (HCC): ICD-10-CM

## 2023-03-08 DIAGNOSIS — I25.118 ATHEROSCLEROTIC HEART DISEASE OF NATIVE CORONARY ARTERY WITH OTHER FORMS OF ANGINA PECTORIS (HCC): Primary | ICD-10-CM

## 2023-03-08 PROCEDURE — G8484 FLU IMMUNIZE NO ADMIN: HCPCS | Performed by: FAMILY MEDICINE

## 2023-03-08 PROCEDURE — 99213 OFFICE O/P EST LOW 20 MIN: CPT | Performed by: FAMILY MEDICINE

## 2023-03-08 PROCEDURE — 3023F SPIROM DOC REV: CPT | Performed by: FAMILY MEDICINE

## 2023-03-08 PROCEDURE — 1123F ACP DISCUSS/DSCN MKR DOCD: CPT | Performed by: FAMILY MEDICINE

## 2023-03-08 PROCEDURE — 1036F TOBACCO NON-USER: CPT | Performed by: FAMILY MEDICINE

## 2023-03-08 PROCEDURE — 3079F DIAST BP 80-89 MM HG: CPT | Performed by: FAMILY MEDICINE

## 2023-03-08 PROCEDURE — 3017F COLORECTAL CA SCREEN DOC REV: CPT | Performed by: FAMILY MEDICINE

## 2023-03-08 PROCEDURE — G8417 CALC BMI ABV UP PARAM F/U: HCPCS | Performed by: FAMILY MEDICINE

## 2023-03-08 PROCEDURE — G8427 DOCREV CUR MEDS BY ELIG CLIN: HCPCS | Performed by: FAMILY MEDICINE

## 2023-03-08 PROCEDURE — 3074F SYST BP LT 130 MM HG: CPT | Performed by: FAMILY MEDICINE

## 2023-03-08 RX ORDER — CARVEDILOL 12.5 MG/1
TABLET ORAL
Qty: 180 TABLET | Refills: 1 | Status: SHIPPED | OUTPATIENT
Start: 2023-03-08

## 2023-03-08 RX ORDER — CLOPIDOGREL BISULFATE 75 MG/1
TABLET ORAL
Qty: 45 TABLET | Refills: 1 | Status: SHIPPED | OUTPATIENT
Start: 2023-03-08

## 2023-03-08 RX ORDER — LISINOPRIL 20 MG/1
TABLET ORAL
Qty: 90 TABLET | Refills: 1 | Status: SHIPPED | OUTPATIENT
Start: 2023-03-08

## 2023-03-08 RX ORDER — ALBUTEROL SULFATE 90 UG/1
2 AEROSOL, METERED RESPIRATORY (INHALATION) 4 TIMES DAILY PRN
Qty: 3 EACH | Refills: 1 | Status: SHIPPED | OUTPATIENT
Start: 2023-03-08

## 2023-03-08 RX ORDER — AMLODIPINE BESYLATE 5 MG/1
TABLET ORAL
Qty: 90 TABLET | Refills: 1 | Status: SHIPPED | OUTPATIENT
Start: 2023-03-08

## 2023-03-08 ASSESSMENT — ENCOUNTER SYMPTOMS
BLOOD IN STOOL: 0
EYE PAIN: 0
VOMITING: 0
CHEST TIGHTNESS: 0
TROUBLE SWALLOWING: 0
SHORTNESS OF BREATH: 0
ABDOMINAL PAIN: 0
NAUSEA: 0
DIARRHEA: 0
WHEEZING: 0

## 2023-03-08 NOTE — PROGRESS NOTES
3/8/2023    [facility]    Chief Complaint   Patient presents with    1 Month Follow-Up     Follow up MRI, heart cath. Feeling good. HPI  History was obtained from the patient. Charla Magana is a 75 y.o. male who presents today with follow-up. Patient had memory issues and word finding problems his millimeters SC was 30 out of 30. MRI of head showed only some microvascular changes consistent with probable slight ischemia and age-related changes/ atrophy. Patient had recent heart cath that was stable- patient is already on baby aspirin and Plavix. He is quite statin intolerant-he is on Repatha. Recent labs look stable. Patient's word finding issue has definitely improved. REVIEW OF SYMPTOMS    Review of Systems   Constitutional:  Negative for activity change and fatigue. HENT:  Negative for congestion, hearing loss, mouth sores and trouble swallowing. Eyes:  Negative for pain and visual disturbance. Respiratory:  Negative for chest tightness, shortness of breath and wheezing. Cardiovascular:  Negative for chest pain and palpitations. Gastrointestinal:  Negative for abdominal pain, blood in stool, diarrhea, nausea and vomiting. Endocrine: Negative for polydipsia and polyuria. Genitourinary:  Negative for dysuria, frequency and urgency. Musculoskeletal:  Negative for arthralgias, gait problem and neck stiffness. Skin:  Negative for rash. Allergic/Immunologic: Negative for environmental allergies. Neurological:  Negative for dizziness, seizures, speech difficulty and weakness. Hematological:  Does not bruise/bleed easily. Psychiatric/Behavioral:  Negative for agitation, confusion, hallucinations, self-injury and suicidal ideas. The patient is not nervous/anxious.       PAST MEDICAL HISTORY  Past Medical History:   Diagnosis Date    Acid reflux     Basal Cell Skin Cancer Excised Nose 1992    Basal Cell Skin Cancer Excised Nose    CAD (coronary artery disease)     Sees  Ava    COPD (chronic obstructive pulmonary disease) ([facility] [address]     Essential hypertension     H/O cardiovascular stress test 5/29/2014    cardiolite-normal, no ischemia, EF 60%    H/O cardiovascular stress test 1/18/2015    cardiolite-normal, EF51%    H/O cardiovascular stress test 02/12/2018    EF60% normal study    H/O Doppler lower arterial ultrasound 04/22/2019    No significant occlusive arterial disease, Patent right femoral popliteal bypass graft with biphasic waveforms, bilateral abis show mild peripheral arterial disease    H/O Doppler ultrasound 6/1/2016    Lower Ext Arterial- normal study, mild stenosis in RLE    H/O echocardiogram 5/28/2014    EF35-45% mild MR, mild AV calcification    H/O echocardiogram 09/25/2019    EF 49-80%, grade I diastolic dysfunction, No significant valvular disease, no pericardial effusion     Heart attack ([facility] [address] 2014    History of cardiac cath 02/12/2019    PATENT RENAL ARTERIES DISTAL ABD AORTA ATHEROSCLEROTIC, ANEYRYSMAL, RCIA PARENT, REIA  PATENT, RT SFA OCCLUDED, DISTALLY RECONSTITUTES, 3 V RUNOFF,  LT MARIA ISABEL PATENT, CHRIS 70% TO 0%,  LT SFA 80% TO 0% WITH ATHRECTOMY TO PTA, LT POP PATENT,  3 V run off                        History of nuclear stress test 09/25/2019    EF 51%, Normal    History of percutaneous left heart catheterization 5/3/2014    RCA 99% distal stenosis EF55%    History of Peripheral Angiogram 03/07/2019    RCIA PATENT, REIA CALCIFIED 70% STENOSIS, RSFA OCCLUDED NO STUMP, PFA  PATENT FILL DISTAL SFA WITH COLLATERLS, RT POP PATENT, 3 V RUN OFF    Hyperlipidemia     Lumbosacral spondylosis     Primary erectile dysfunction     S/P CABG x 4 2014    vein graft diagonal coronary artery,vein graft distal RCA,vein graft obtuse marginal coronary artery and left internal mammary artery to the left anterior descending    Shortness of breath on exertion     Teeth missing     Upper And Lower    Wears dentures     Full Upper    Wears glasses        FAMILY HISTORY  Family History   Adopted: Yes   Problem Relation Age of Onset    Heart Disease Mother     Hypertension Mother     Diabetes Mother     Lung Cancer Sister     No Known Problems Sister     No Known Problems Sister     Cancer Brother         unknown [place]    COPD Brother     No Known Problems Brother     No Known Problems Brother        SOCIAL HISTORY  Social History     Socioeconomic History    Marital status:    Tobacco Use    Smoking status: Former     Packs/day: 1.00     Years: 55.00     Pack years: 55.00     Types: Cigarettes, Pipe, Cigars     Start date:      Quit date:      Years since quittin.1    Smokeless tobacco: Former     Types: Chew     Quit date:    Vaping Use    Vaping Use: Never used   Substance and Sexual Activity    Alcohol use: No     Comment: Caffeine: 3-4 cups of coffee daily     Drug use: No    Sexual activity: Not Currently     Social Determinants of Health     Financial Resource Strain: Low Risk     Difficulty of Paying Living Expenses: Not hard at all   Food Insecurity: No Food Insecurity    Worried About Running Out of Food in the Last Year: Never true    Ran Out of Food in the Last Year: Never true   Transportation Needs: Unknown    Lack of Transportation (Non-Medical):  No   Physical Activity: Inactive    Days of Exercise per Week: 0 days    Minutes of Exercise per Session: 0 min   Housing Stability: Unknown    Unstable Housing in the Last Year: No        SURGICAL HISTORY  Past Surgical History:   Procedure Laterality Date    CARDIAC SURGERY      CABG (4 Bypasses)    COLONOSCOPY  Last Done In 2019    Polyps Removed In Past    DENTAL SURGERY      Teeth Extracted In Past    FEMORAL BYPASS Right 3/25/2019    RIGHT FEMORAL POPLITEAL BYPASS performed [facility] Left 2019    \"Stent\"    SKIN CANCER EXCISION      Basal Cell Skin Cancer Excised Nose    TONSILLECTOMY AND ADENOIDECTOMY  's                 CURRENT MEDICATIONS  Current Outpatient Medications   Medication Sig Dispense Refill    tiotropium-olodaterol (STIOLTO RESPIMAT) 2.5-2.5 MCG/ACT AERS Inhale 2 puffs into the lungs daily 3 each 1    albuterol sulfate HFA (PROVENTIL;VENTOLIN;PROAIR) 108 (90 Base) MCG/ACT inhaler Inhale 2 puffs into the lungs 4 times daily as needed for Wheezing 3 each 1    amLODIPine (NORVASC) 5 MG tablet TAKE 1 TABLET BY MOUTH ONCE DAILY IN THE MORNING 90 tablet 1    carvedilol (COREG) 12.5 MG tablet TAKE 1 TABLET BY MOUTH TWICE DAILY WITH MEALS 180 tablet 1    clopidogrel (PLAVIX) 75 MG tablet Take 1/2 (one-half) tablet by mouth once daily 45 tablet 1    lisinopril (PRINIVIL;ZESTRIL) 20 MG tablet Take 1 tablet by mouth once daily 90 tablet 1    Evolocumab (REPATHA SURECLICK) 583 MG/ML SOAJ Inject 140 mg into the skin every 14 days 2 mL 5    aspirin 81 MG tablet Take 81 mg by mouth nightly Over The Counter       No current facility-administered medications for this visit. ALLERGIES  Allergies   Allergen Reactions    Statins      \"Paralyzes Muscles And Causes Muscle Weakness\"    Lansoprazole      dizziness    Lipitor [Atorvastatin Calcium]      Muscle weakness    Spiriva Handihaler [Tiotropium Bromide Monohydrate]      Hoarse voice       PHYSICAL EXAM    /84 (Site: Right Upper Arm, Position: Sitting, Cuff Size: Medium Adult)   Pulse 62   Resp 16   Ht 5' 11\" (1.803 m)   Wt 221 lb 11.2 oz (100.6 kg)   SpO2 95%   BMI 30.92 kg/m²     Physical Exam  Vitals and nursing note reviewed. Constitutional:       Appearance: He is well-developed. HENT:      Head: Normocephalic and atraumatic. Eyes:      Pupils: Pupils are equal, round, and reactive to light. Cardiovascular:      Rate and Rhythm: Normal rate and regular rhythm. Heart sounds: Normal heart sounds. Pulmonary:      Effort: Pulmonary effort is normal.      Breath sounds: Normal breath sounds. Abdominal:      Palpations: Abdomen is soft.    Musculoskeletal:         General: Normal range of motion. Cervical back: Normal range of motion and neck supple. Skin:     General: Skin is warm and dry. Neurological:      Mental Status: He is alert and oriented to person, place, and time. Psychiatric:         Thought Content: Thought content normal.       ASSESSMENT & PLAN     Diagnosis Orders   1. Atherosclerotic heart disease of native coronary artery with other forms of angina pectoris ([facility]        2. Essential hypertension  amLODIPine (NORVASC) 5 MG tablet    carvedilol (COREG) 12.5 MG tablet    lisinopril (PRINIVIL;ZESTRIL) 20 MG tablet      3. Pulmonary emphysema, unspecified emphysema type ([facility]        4. Stage 3 chronic kidney disease, unspecified whether stage 3a or 3b CKD ([facility]        Continue with usual regimen- encouragement and discussion carried out. Reassurance provided- keep appointments with subspecialists. He is to continue to work on healthy lifestyle. Med list reviewed and multiple refills given. Return in about 4 months (around 7/8/2023).          Electronically signed by Indio Meraz MD on 3/8/2023

## 2023-03-10 ENCOUNTER — TELEPHONE (OUTPATIENT)
Dept: FAMILY MEDICINE CLINIC | Age: 73
End: 2023-03-10

## 2023-03-10 NOTE — TELEPHONE ENCOUNTER
Patients wife Isabelle called and stated the Sriolto Respimat inhaler is $284.00.  This is something they can not afford.  Does the office have any samples. Patient is out of this medication. Call patients wife Isabelle and advise

## 2023-03-10 NOTE — TELEPHONE ENCOUNTER
Per John samples ready for . Left message to return call on wife's number. Patient needs to contact insurance to see what is comparable that will be covered.

## 2023-03-10 NOTE — TELEPHONE ENCOUNTER
Spoke with wife, Isabelle. She will  samples. Will call insurance and find out what will be comparable.

## 2023-04-17 ENCOUNTER — TELEPHONE (OUTPATIENT)
Dept: CARDIOLOGY CLINIC | Age: 73
End: 2023-04-17

## 2023-04-17 NOTE — TELEPHONE ENCOUNTER
Patient stopped in office with renewal form for [employer] for [address] [place] [address]. Renewal submitted and approval received. New coverage good from 5/12/23-5/11/24. Patient advised and voiced understanding.

## 2023-05-08 DIAGNOSIS — I10 ESSENTIAL HYPERTENSION: ICD-10-CM

## 2023-05-08 DIAGNOSIS — I25.10 ASCVD (ARTERIOSCLEROTIC CARDIOVASCULAR DISEASE): ICD-10-CM

## 2023-05-09 RX ORDER — EVOLOCUMAB 140 MG/ML
140 INJECTION, SOLUTION SUBCUTANEOUS
Qty: 2 ML | Refills: 5 | Status: SHIPPED | OUTPATIENT
Start: 2023-05-09

## 2023-06-19 ENCOUNTER — OFFICE VISIT (OUTPATIENT)
Dept: CARDIOLOGY CLINIC | Age: 73
End: 2023-06-19
Payer: MEDICARE

## 2023-06-19 VITALS
DIASTOLIC BLOOD PRESSURE: 80 MMHG | HEIGHT: 71 IN | HEART RATE: 68 BPM | SYSTOLIC BLOOD PRESSURE: 134 MMHG | BODY MASS INDEX: 31.64 KG/M2 | WEIGHT: 226 LBS

## 2023-06-19 DIAGNOSIS — I25.118 ATHEROSCLEROTIC HEART DISEASE OF NATIVE CORONARY ARTERY WITH OTHER FORMS OF ANGINA PECTORIS (HCC): ICD-10-CM

## 2023-06-19 DIAGNOSIS — I73.9 PERIPHERAL VASCULAR DISEASE, UNSPECIFIED (HCC): Primary | ICD-10-CM

## 2023-06-19 PROCEDURE — G8417 CALC BMI ABV UP PARAM F/U: HCPCS | Performed by: INTERNAL MEDICINE

## 2023-06-19 PROCEDURE — 3075F SYST BP GE 130 - 139MM HG: CPT | Performed by: INTERNAL MEDICINE

## 2023-06-19 PROCEDURE — 3017F COLORECTAL CA SCREEN DOC REV: CPT | Performed by: INTERNAL MEDICINE

## 2023-06-19 PROCEDURE — 1036F TOBACCO NON-USER: CPT | Performed by: INTERNAL MEDICINE

## 2023-06-19 PROCEDURE — 3079F DIAST BP 80-89 MM HG: CPT | Performed by: INTERNAL MEDICINE

## 2023-06-19 PROCEDURE — 1123F ACP DISCUSS/DSCN MKR DOCD: CPT | Performed by: INTERNAL MEDICINE

## 2023-06-19 PROCEDURE — 99214 OFFICE O/P EST MOD 30 MIN: CPT | Performed by: INTERNAL MEDICINE

## 2023-06-19 PROCEDURE — G8427 DOCREV CUR MEDS BY ELIG CLIN: HCPCS | Performed by: INTERNAL MEDICINE

## 2023-06-19 NOTE — PATIENT INSTRUCTIONS
Please be informed that if you contact our office outside of normal business hours the physician on call cannot help with any scheduling or rescheduling issues, procedure instruction questions or any type of medication issue. We advise you for any urgent/emergency that you go to the nearest emergency room! PLEASE CALL OUR OFFICE DURING NORMAL BUSINESS HOURS    Monday - Friday   8 am to 5 pm    [facility] [address]: [phone]    [place]:  [phone]  **It is YOUR responsibilty to bring medication bottles and/or updated medication list to [address] [place] [address]. This will allow us to better serve you and all your healthcare needs**  [facility] Locations - No appointment necessary. Sites open Monday to Friday. Call your preferred location for test preparation, business   hours and other information you need. [employer]. [address] [facility], [address] [facility]  Phone: [phone]     Thank you for allowing us to care for you today! We want to ensure we can follow your treatment plan and we strive to give you the best outcomes and experience possible. If you ever have a life threatening emergency and call 911 - for an ambulance (EMS)   Our providers can only care for you at:   [facility] or [facility]. Even if you have someone take you or you drive yourself we can only care for you in a [facility] facility. Our providers are not setup at the other healthcare locations!

## 2023-07-07 ENCOUNTER — OFFICE VISIT (OUTPATIENT)
Dept: FAMILY MEDICINE CLINIC | Age: 73
End: 2023-07-07
Payer: MEDICARE

## 2023-07-07 VITALS
WEIGHT: 223 LBS | BODY MASS INDEX: 31.22 KG/M2 | OXYGEN SATURATION: 94 % | SYSTOLIC BLOOD PRESSURE: 148 MMHG | HEART RATE: 58 BPM | HEIGHT: 71 IN | DIASTOLIC BLOOD PRESSURE: 84 MMHG

## 2023-07-07 DIAGNOSIS — R60.9 EDEMA, UNSPECIFIED TYPE: ICD-10-CM

## 2023-07-07 DIAGNOSIS — R73.9 HYPERGLYCEMIA: ICD-10-CM

## 2023-07-07 DIAGNOSIS — I10 ESSENTIAL HYPERTENSION: ICD-10-CM

## 2023-07-07 DIAGNOSIS — M47.817 SPONDYLOSIS OF LUMBOSACRAL REGION, UNSPECIFIED SPINAL OSTEOARTHRITIS COMPLICATION STATUS: ICD-10-CM

## 2023-07-07 DIAGNOSIS — I25.10 ASCVD (ARTERIOSCLEROTIC CARDIOVASCULAR DISEASE): ICD-10-CM

## 2023-07-07 DIAGNOSIS — J43.9 PULMONARY EMPHYSEMA, UNSPECIFIED EMPHYSEMA TYPE (HCC): Primary | ICD-10-CM

## 2023-07-07 LAB
ALBUMIN SERPL-MCNC: 4.6 G/DL (ref 3.4–5)
ALBUMIN/GLOB SERPL: 1.6 {RATIO} (ref 1.1–2.2)
ALP SERPL-CCNC: 80 U/L (ref 40–129)
ALT SERPL-CCNC: 13 U/L (ref 10–40)
ANION GAP SERPL CALCULATED.3IONS-SCNC: 12 MMOL/L (ref 3–16)
AST SERPL-CCNC: 19 U/L (ref 15–37)
BILIRUB SERPL-MCNC: 0.7 MG/DL (ref 0–1)
BUN SERPL-MCNC: 17 MG/DL (ref 7–20)
CALCIUM SERPL-MCNC: 10.3 MG/DL (ref 8.3–10.6)
CHLORIDE SERPL-SCNC: 100 MMOL/L (ref 99–110)
CHOLEST SERPL-MCNC: 179 MG/DL (ref 0–199)
CO2 SERPL-SCNC: 26 MMOL/L (ref 21–32)
CREAT SERPL-MCNC: 1.1 MG/DL (ref 0.8–1.3)
GFR SERPLBLD CREATININE-BSD FMLA CKD-EPI: >60 ML/MIN/{1.73_M2}
GLUCOSE SERPL-MCNC: 101 MG/DL (ref 70–99)
HDLC SERPL-MCNC: 51 MG/DL (ref 40–60)
LDLC SERPL CALC-MCNC: 107 MG/DL
POTASSIUM SERPL-SCNC: 4.8 MMOL/L (ref 3.5–5.1)
PROT SERPL-MCNC: 7.5 G/DL (ref 6.4–8.2)
SODIUM SERPL-SCNC: 138 MMOL/L (ref 136–145)
TRIGL SERPL-MCNC: 105 MG/DL (ref 0–150)
VLDLC SERPL CALC-MCNC: 21 MG/DL

## 2023-07-07 PROCEDURE — 3017F COLORECTAL CA SCREEN DOC REV: CPT | Performed by: FAMILY MEDICINE

## 2023-07-07 PROCEDURE — 1036F TOBACCO NON-USER: CPT | Performed by: FAMILY MEDICINE

## 2023-07-07 PROCEDURE — G8417 CALC BMI ABV UP PARAM F/U: HCPCS | Performed by: FAMILY MEDICINE

## 2023-07-07 PROCEDURE — 99214 OFFICE O/P EST MOD 30 MIN: CPT | Performed by: FAMILY MEDICINE

## 2023-07-07 PROCEDURE — 3023F SPIROM DOC REV: CPT | Performed by: FAMILY MEDICINE

## 2023-07-07 PROCEDURE — 3077F SYST BP >= 140 MM HG: CPT | Performed by: FAMILY MEDICINE

## 2023-07-07 PROCEDURE — 1123F ACP DISCUSS/DSCN MKR DOCD: CPT | Performed by: FAMILY MEDICINE

## 2023-07-07 PROCEDURE — G8427 DOCREV CUR MEDS BY ELIG CLIN: HCPCS | Performed by: FAMILY MEDICINE

## 2023-07-07 PROCEDURE — 3079F DIAST BP 80-89 MM HG: CPT | Performed by: FAMILY MEDICINE

## 2023-07-07 ASSESSMENT — ENCOUNTER SYMPTOMS
SHORTNESS OF BREATH: 0
NAUSEA: 0
ABDOMINAL PAIN: 0
WHEEZING: 0
DIARRHEA: 0
VOMITING: 0
EYE PAIN: 0
BLOOD IN STOOL: 0
CHEST TIGHTNESS: 0
TROUBLE SWALLOWING: 0

## 2023-07-07 NOTE — PROGRESS NOTES
Physical Exam  Vitals and nursing note reviewed. Constitutional:       General: He is not in acute distress. Appearance: Normal appearance. He is well-developed. He is not ill-appearing or toxic-appearing. HENT:      Head: Normocephalic and atraumatic. Nose: Nose normal.      Mouth/Throat:      Mouth: Mucous membranes are moist.   Eyes:      Pupils: Pupils are equal, round, and reactive to light. Cardiovascular:      Rate and Rhythm: Normal rate and regular rhythm. Heart sounds: Normal heart sounds. No murmur heard. No gallop. Pulmonary:      Effort: Pulmonary effort is normal. No respiratory distress. Breath sounds: No wheezing, rhonchi or rales. Comments: Slight expiratory prolongation noted  Abdominal:      Palpations: Abdomen is soft. Musculoskeletal:         General: No deformity. Normal range of motion. Cervical back: Normal range of motion and neck supple. No rigidity. Lymphadenopathy:      Cervical: No cervical adenopathy. Skin:     General: Skin is warm and dry. Neurological:      General: No focal deficit present. Mental Status: He is alert and oriented to person, place, and time. Mental status is at baseline. Cranial Nerves: No cranial nerve deficit. Psychiatric:         Mood and Affect: Mood normal.         Behavior: Behavior normal.         Thought Content: Thought content normal.       ASSESSMENT & PLAN     Diagnosis Orders   1. Pulmonary emphysema, unspecified emphysema type ([address])        2. Essential hypertension  Comprehensive Metabolic Panel      3. Spondylosis of lumbosacral region, unspecified spinal osteoarthritis complication status        4. Hyperglycemia  Hemoglobin A1C      5. ASCVD (arteriosclerotic cardiovascular disease)  LIPID PANEL      6. Edema, unspecified type        We will check A1c, CMP and lipid panel and have him follow-up for results work on increasing exercise call with refills needed.   He defers on current

## 2023-07-08 LAB
EST. AVERAGE GLUCOSE BLD GHB EST-MCNC: 125.5 MG/DL
HBA1C MFR BLD: 6 %

## 2023-08-28 RX ORDER — CLOPIDOGREL BISULFATE 75 MG/1
TABLET ORAL
Qty: 45 TABLET | Refills: 1 | Status: SHIPPED | OUTPATIENT
Start: 2023-08-28

## 2023-10-07 DIAGNOSIS — I10 ESSENTIAL HYPERTENSION: ICD-10-CM

## 2023-10-07 DIAGNOSIS — I25.10 ASCVD (ARTERIOSCLEROTIC CARDIOVASCULAR DISEASE): ICD-10-CM

## 2023-10-09 RX ORDER — EVOLOCUMAB 140 MG/ML
140 INJECTION, SOLUTION SUBCUTANEOUS
Qty: 2 ML | Refills: 5 | Status: SHIPPED | OUTPATIENT
Start: 2023-10-09

## 2023-10-13 RX ORDER — TIOTROPIUM BROMIDE AND OLODATEROL 3.124; 2.736 UG/1; UG/1
SPRAY, METERED RESPIRATORY (INHALATION)
Qty: 3 EACH | Refills: 1 | Status: SHIPPED | OUTPATIENT
Start: 2023-10-13

## 2023-10-22 DIAGNOSIS — I10 ESSENTIAL HYPERTENSION: ICD-10-CM

## 2023-10-24 RX ORDER — AMLODIPINE BESYLATE 5 MG/1
TABLET ORAL
Qty: 90 TABLET | Refills: 1 | Status: SHIPPED | OUTPATIENT
Start: 2023-10-24

## 2023-11-07 ENCOUNTER — OFFICE VISIT (OUTPATIENT)
Dept: FAMILY MEDICINE CLINIC | Age: 73
End: 2023-11-07
Payer: MEDICARE

## 2023-11-07 VITALS
SYSTOLIC BLOOD PRESSURE: 126 MMHG | HEIGHT: 70 IN | RESPIRATION RATE: 14 BRPM | HEART RATE: 61 BPM | BODY MASS INDEX: 32.88 KG/M2 | OXYGEN SATURATION: 94 % | WEIGHT: 229.7 LBS | DIASTOLIC BLOOD PRESSURE: 72 MMHG

## 2023-11-07 DIAGNOSIS — R73.9 HYPERGLYCEMIA: ICD-10-CM

## 2023-11-07 DIAGNOSIS — M47.817 SPONDYLOSIS OF LUMBOSACRAL REGION, UNSPECIFIED SPINAL OSTEOARTHRITIS COMPLICATION STATUS: ICD-10-CM

## 2023-11-07 DIAGNOSIS — J43.9 PULMONARY EMPHYSEMA, UNSPECIFIED EMPHYSEMA TYPE (HCC): Primary | ICD-10-CM

## 2023-11-07 DIAGNOSIS — N18.30 STAGE 3 CHRONIC KIDNEY DISEASE, UNSPECIFIED WHETHER STAGE 3A OR 3B CKD (HCC): ICD-10-CM

## 2023-11-07 DIAGNOSIS — I25.10 ASCVD (ARTERIOSCLEROTIC CARDIOVASCULAR DISEASE): ICD-10-CM

## 2023-11-07 DIAGNOSIS — K21.9 GASTROESOPHAGEAL REFLUX DISEASE, UNSPECIFIED WHETHER ESOPHAGITIS PRESENT: ICD-10-CM

## 2023-11-07 DIAGNOSIS — I10 ESSENTIAL HYPERTENSION: ICD-10-CM

## 2023-11-07 LAB
ALBUMIN SERPL-MCNC: 4.3 G/DL (ref 3.4–5)
ALBUMIN/GLOB SERPL: 1.6 {RATIO} (ref 1.1–2.2)
ALP SERPL-CCNC: 83 U/L (ref 40–129)
ALT SERPL-CCNC: 11 U/L (ref 10–40)
ANION GAP SERPL CALCULATED.3IONS-SCNC: 10 MMOL/L (ref 3–16)
AST SERPL-CCNC: 17 U/L (ref 15–37)
BASOPHILS # BLD: 0.1 K/UL (ref 0–0.2)
BASOPHILS NFR BLD: 2 %
BILIRUB SERPL-MCNC: 0.6 MG/DL (ref 0–1)
BUN SERPL-MCNC: 21 MG/DL (ref 7–20)
CALCIUM SERPL-MCNC: 9.5 MG/DL (ref 8.3–10.6)
CHLORIDE SERPL-SCNC: 103 MMOL/L (ref 99–110)
CO2 SERPL-SCNC: 26 MMOL/L (ref 21–32)
CREAT SERPL-MCNC: 1.2 MG/DL (ref 0.8–1.3)
DEPRECATED RDW RBC AUTO: 14.1 % (ref 12.4–15.4)
EOSINOPHIL # BLD: 0.2 K/UL (ref 0–0.6)
EOSINOPHIL NFR BLD: 3 %
GFR SERPLBLD CREATININE-BSD FMLA CKD-EPI: >60 ML/MIN/{1.73_M2}
GLUCOSE SERPL-MCNC: 96 MG/DL (ref 70–99)
HCT VFR BLD AUTO: 46.5 % (ref 40.5–52.5)
HGB BLD-MCNC: 16 G/DL (ref 13.5–17.5)
LYMPHOCYTES # BLD: 2.1 K/UL (ref 1–5.1)
LYMPHOCYTES NFR BLD: 28 %
MCH RBC QN AUTO: 33.1 PG (ref 26–34)
MCHC RBC AUTO-ENTMCNC: 34.4 G/DL (ref 31–36)
MCV RBC AUTO: 96.2 FL (ref 80–100)
MONOCYTES # BLD: 0.4 K/UL (ref 0–1.3)
MONOCYTES NFR BLD: 5 %
NEUTROPHILS # BLD: 4.2 K/UL (ref 1.7–7.7)
NEUTROPHILS NFR BLD: 60 %
NRBC BLD-RTO: 1 /100 WBC
PLATELET # BLD AUTO: 254 K/UL (ref 135–450)
PMV BLD AUTO: 6.8 FL (ref 5–10.5)
POTASSIUM SERPL-SCNC: 4.4 MMOL/L (ref 3.5–5.1)
PROT SERPL-MCNC: 7 G/DL (ref 6.4–8.2)
RBC # BLD AUTO: 4.83 M/UL (ref 4.2–5.9)
RBC MORPH BLD: NORMAL
SODIUM SERPL-SCNC: 139 MMOL/L (ref 136–145)
VARIANT LYMPHS NFR BLD MANUAL: 2 % (ref 0–6)
WBC # BLD AUTO: 7 K/UL (ref 4–11)

## 2023-11-07 PROCEDURE — G8417 CALC BMI ABV UP PARAM F/U: HCPCS | Performed by: FAMILY MEDICINE

## 2023-11-07 PROCEDURE — 3074F SYST BP LT 130 MM HG: CPT | Performed by: FAMILY MEDICINE

## 2023-11-07 PROCEDURE — 1123F ACP DISCUSS/DSCN MKR DOCD: CPT | Performed by: FAMILY MEDICINE

## 2023-11-07 PROCEDURE — 3078F DIAST BP <80 MM HG: CPT | Performed by: FAMILY MEDICINE

## 2023-11-07 PROCEDURE — G8484 FLU IMMUNIZE NO ADMIN: HCPCS | Performed by: FAMILY MEDICINE

## 2023-11-07 PROCEDURE — 1036F TOBACCO NON-USER: CPT | Performed by: FAMILY MEDICINE

## 2023-11-07 PROCEDURE — 3017F COLORECTAL CA SCREEN DOC REV: CPT | Performed by: FAMILY MEDICINE

## 2023-11-07 PROCEDURE — G8427 DOCREV CUR MEDS BY ELIG CLIN: HCPCS | Performed by: FAMILY MEDICINE

## 2023-11-07 PROCEDURE — 3023F SPIROM DOC REV: CPT | Performed by: FAMILY MEDICINE

## 2023-11-07 PROCEDURE — 99214 OFFICE O/P EST MOD 30 MIN: CPT | Performed by: FAMILY MEDICINE

## 2023-11-07 RX ORDER — AMLODIPINE BESYLATE 5 MG/1
5 TABLET ORAL EVERY MORNING
Qty: 90 TABLET | Refills: 1 | Status: SHIPPED | OUTPATIENT
Start: 2023-11-07

## 2023-11-07 RX ORDER — ALBUTEROL SULFATE 90 UG/1
2 AEROSOL, METERED RESPIRATORY (INHALATION) 4 TIMES DAILY PRN
Qty: 3 EACH | Refills: 1 | Status: SHIPPED | OUTPATIENT
Start: 2023-11-07

## 2023-11-07 RX ORDER — CLOPIDOGREL BISULFATE 75 MG/1
TABLET ORAL
Qty: 45 TABLET | Refills: 1 | Status: SHIPPED | OUTPATIENT
Start: 2023-11-07

## 2023-11-07 RX ORDER — LISINOPRIL 20 MG/1
TABLET ORAL
Qty: 90 TABLET | Refills: 1 | Status: SHIPPED | OUTPATIENT
Start: 2023-11-07

## 2023-11-07 RX ORDER — CARVEDILOL 12.5 MG/1
TABLET ORAL
Qty: 180 TABLET | Refills: 1 | Status: SHIPPED | OUTPATIENT
Start: 2023-11-07

## 2023-11-07 ASSESSMENT — ENCOUNTER SYMPTOMS
EYE PAIN: 0
CHEST TIGHTNESS: 0
WHEEZING: 0
DIARRHEA: 0
BLOOD IN STOOL: 0
ABDOMINAL PAIN: 0
SHORTNESS OF BREATH: 0
VOMITING: 0
BACK PAIN: 1
TROUBLE SWALLOWING: 0
NAUSEA: 0

## 2023-11-07 NOTE — PROGRESS NOTES
spinal osteoarthritis complication status        7. Hyperglycemia        Med list reviewed and refills provided. Patient is encouraged to continue with healthy lifestyle work on mild weight loss and regular exercise. He will keep appointments with specialists as directed. He is to consider RSV vaccine at pharmacy or health department. Check CBC and a CMP today and have him follow-up for results. Call with other changes or problems. Overall doing well. No follow-ups on file.          Electronically signed by Liz Sarabia MD on 11/7/2023

## 2023-12-04 ENCOUNTER — TELEPHONE (OUTPATIENT)
Dept: CARDIOLOGY CLINIC | Age: 73
End: 2023-12-04

## 2024-01-19 ENCOUNTER — HOSPITAL ENCOUNTER (OUTPATIENT)
Age: 74
Discharge: HOME | End: 2024-01-19
Payer: MEDICARE

## 2024-01-19 DIAGNOSIS — I25.10 ASCVD (ARTERIOSCLEROTIC CARDIOVASCULAR DISEASE): ICD-10-CM

## 2024-01-19 DIAGNOSIS — I73.9 PERIPHERAL VASCULAR DISEASE, UNSPECIFIED (HCC): ICD-10-CM

## 2024-01-19 LAB
CHOLEST SERPL-MCNC: 117 MG/DL
HDLC SERPL-MCNC: 49 MG/DL
LDLC SERPL CALC-MCNC: 53 MG/DL
TRIGL SERPL-MCNC: 73 MG/DL

## 2024-01-19 PROCEDURE — 80061 LIPID PANEL: CPT

## 2024-01-19 PROCEDURE — 36415 COLL VENOUS BLD VENIPUNCTURE: CPT

## 2024-01-22 ENCOUNTER — TELEPHONE (OUTPATIENT)
Dept: CARDIOLOGY CLINIC | Age: 74
End: 2024-01-22

## 2024-01-22 NOTE — TELEPHONE ENCOUNTER
----- Message from CHRISTOPHER Kilpatrick - CNP sent at 1/19/2024  3:11 PM EST -----  Labs look good - LDL 53: congrats   
Called patient to provide him with lab results. Patient verbalized understanding and confirmed next appointment.   
no

## 2024-02-08 ENCOUNTER — TELEMEDICINE (OUTPATIENT)
Dept: FAMILY MEDICINE CLINIC | Age: 74
End: 2024-02-08

## 2024-02-08 DIAGNOSIS — Z00.00 MEDICARE ANNUAL WELLNESS VISIT, SUBSEQUENT: Primary | ICD-10-CM

## 2024-02-08 SDOH — ECONOMIC STABILITY: FOOD INSECURITY: WITHIN THE PAST 12 MONTHS, YOU WORRIED THAT YOUR FOOD WOULD RUN OUT BEFORE YOU GOT MONEY TO BUY MORE.: NEVER TRUE

## 2024-02-08 SDOH — ECONOMIC STABILITY: INCOME INSECURITY: HOW HARD IS IT FOR YOU TO PAY FOR THE VERY BASICS LIKE FOOD, HOUSING, MEDICAL CARE, AND HEATING?: NOT HARD AT ALL

## 2024-02-08 SDOH — ECONOMIC STABILITY: FOOD INSECURITY: WITHIN THE PAST 12 MONTHS, THE FOOD YOU BOUGHT JUST DIDN'T LAST AND YOU DIDN'T HAVE MONEY TO GET MORE.: NEVER TRUE

## 2024-02-08 ASSESSMENT — LIFESTYLE VARIABLES
HOW MANY STANDARD DRINKS CONTAINING ALCOHOL DO YOU HAVE ON A TYPICAL DAY: PATIENT DOES NOT DRINK
HOW OFTEN DO YOU HAVE A DRINK CONTAINING ALCOHOL: NEVER

## 2024-02-08 NOTE — PATIENT INSTRUCTIONS
immunizations, labs, imaging, and/or referrals for you.  A list of these orders (if applicable) as well as your Preventive Care list are included within your After Visit Summary for your review.    Other Preventive Recommendations:    A preventive eye exam performed by an eye specialist is recommended every 1-2 years to screen for glaucoma; cataracts, macular degeneration, and other eye disorders.  A preventive dental visit is recommended every 6 months.  Try to get at least 150 minutes of exercise per week or 10,000 steps per day on a pedometer .  Order or download the FREE \"Exercise & Physical Activity: Your Everyday Guide\" from [employer]. Call 1-933.361.1062 or search [employer] online.  You need 0157-7026 mg of calcium and 5735-6098 IU of vitamin D per day. It is possible to meet your calcium requirement with diet alone, but a vitamin D supplement is usually necessary to meet this goal.  When exposed to the sun, use a sunscreen that protects against both UVA and UVB radiation with an SPF of 30 or greater. Reapply every 2 to 3 hours or after sweating, drying off with a towel, or swimming.  Always wear a seat belt when traveling in a car. Always wear a helmet when riding a bicycle or motorcycle.

## 2024-02-08 NOTE — PROGRESS NOTES
Medicare Annual Wellness Visit    Edward Hurtado is here for Medicare AWV    Assessment & Plan   Medicare annual wellness visit, subsequent  Recommendations for Preventive Services Due: see orders and patient instructions/AVS.  Recommended screening schedule for the next 5-10 years is provided to the patient in written form: see Patient Instructions/AVS.     No follow-ups on file.     Subjective       Patient's complete Health Risk Assessment and screening values have been reviewed and are found in Flowsheets. The following problems were reviewed today and where indicated follow up appointments were made and/or referrals ordered.    Positive Risk Factor Screenings with Interventions:                Activity, Diet, and Weight:  On average, how many days per week do you engage in moderate to strenuous exercise (like a brisk walk)?: 2 days  On average, how many minutes do you engage in exercise at this level?: 20 min    Do you eat balanced/healthy meals regularly?: (!) No    There is no height or weight on file to calculate BMI. (!) Abnormal    Do you eat balanced/healthy meals regularly Interventions:  Patient declines any further evaluation or treatment  Obesity Interventions:  Patient declines any further evaluation or treatment              Vision Screen:  Do you have difficulty driving, watching TV, or doing any of your daily activities because of your eyesight?: No  Have you had an eye exam within the past year?: (!) No (needs)  No results found.    Interventions:   Patient encouraged to make appointment with their eye specialist                    Objective      Patient-Reported Vitals  No data recorded     Unable to obtain 3 vital signs due to patient not having equipment to take blood pressure/temperature.           Allergies   Allergen Reactions    Statins      \"Paralyzes Muscles And Causes Muscle Weakness\"    Lansoprazole      dizziness    Lipitor [Atorvastatin Calcium]      Muscle weakness    Spiriva

## 2024-03-24 ASSESSMENT — PATIENT HEALTH QUESTIONNAIRE - PHQ9
1. LITTLE INTEREST OR PLEASURE IN DOING THINGS: NOT AT ALL
SUM OF ALL RESPONSES TO PHQ9 QUESTIONS 1 & 2: 0
SUM OF ALL RESPONSES TO PHQ9 QUESTIONS 1 & 2: 0
SUM OF ALL RESPONSES TO PHQ QUESTIONS 1-9: 0
SUM OF ALL RESPONSES TO PHQ QUESTIONS 1-9: 0
2. FEELING DOWN, DEPRESSED OR HOPELESS: NOT AT ALL
2. FEELING DOWN, DEPRESSED OR HOPELESS: NOT AT ALL
1. LITTLE INTEREST OR PLEASURE IN DOING THINGS: NOT AT ALL
SUM OF ALL RESPONSES TO PHQ QUESTIONS 1-9: 0
SUM OF ALL RESPONSES TO PHQ QUESTIONS 1-9: 0

## 2024-03-25 NOTE — TELEPHONE ENCOUNTER
Patient is having surgery on 4/9/24.  Dr. Lambert the patient to hold her capecitabine 2 weeks prior to her planned liver surgery.     Milly SchultzD, [facility]  Clinical Oncology Pharmacy Specialist  Phone: [phone]     Patient notified about CT scan and what its for.

## 2024-03-27 ENCOUNTER — OFFICE VISIT (OUTPATIENT)
Dept: FAMILY MEDICINE CLINIC | Age: 74
End: 2024-03-27
Payer: MEDICARE

## 2024-03-27 VITALS
DIASTOLIC BLOOD PRESSURE: 80 MMHG | OXYGEN SATURATION: 94 % | BODY MASS INDEX: 32.27 KG/M2 | HEIGHT: 71 IN | HEART RATE: 81 BPM | TEMPERATURE: 98.2 F | RESPIRATION RATE: 16 BRPM | WEIGHT: 230.5 LBS | SYSTOLIC BLOOD PRESSURE: 142 MMHG

## 2024-03-27 DIAGNOSIS — I25.118 ATHEROSCLEROTIC HEART DISEASE OF NATIVE CORONARY ARTERY WITH OTHER FORMS OF ANGINA PECTORIS (HCC): ICD-10-CM

## 2024-03-27 DIAGNOSIS — I10 ESSENTIAL HYPERTENSION: ICD-10-CM

## 2024-03-27 DIAGNOSIS — M47.817 SPONDYLOSIS OF LUMBOSACRAL REGION, UNSPECIFIED SPINAL OSTEOARTHRITIS COMPLICATION STATUS: ICD-10-CM

## 2024-03-27 DIAGNOSIS — N18.30 STAGE 3 CHRONIC KIDNEY DISEASE, UNSPECIFIED WHETHER STAGE 3A OR 3B CKD (HCC): ICD-10-CM

## 2024-03-27 DIAGNOSIS — R73.9 HYPERGLYCEMIA: ICD-10-CM

## 2024-03-27 DIAGNOSIS — I25.10 ASCVD (ARTERIOSCLEROTIC CARDIOVASCULAR DISEASE): ICD-10-CM

## 2024-03-27 DIAGNOSIS — J43.9 PULMONARY EMPHYSEMA, UNSPECIFIED EMPHYSEMA TYPE (HCC): Primary | ICD-10-CM

## 2024-03-27 DIAGNOSIS — I73.9 PERIPHERAL VASCULAR DISEASE, UNSPECIFIED (HCC): ICD-10-CM

## 2024-03-27 DIAGNOSIS — K21.9 GASTROESOPHAGEAL REFLUX DISEASE, UNSPECIFIED WHETHER ESOPHAGITIS PRESENT: ICD-10-CM

## 2024-03-27 PROCEDURE — 3077F SYST BP >= 140 MM HG: CPT | Performed by: FAMILY MEDICINE

## 2024-03-27 PROCEDURE — 1036F TOBACCO NON-USER: CPT | Performed by: FAMILY MEDICINE

## 2024-03-27 PROCEDURE — 3023F SPIROM DOC REV: CPT | Performed by: FAMILY MEDICINE

## 2024-03-27 PROCEDURE — 3079F DIAST BP 80-89 MM HG: CPT | Performed by: FAMILY MEDICINE

## 2024-03-27 PROCEDURE — 99214 OFFICE O/P EST MOD 30 MIN: CPT | Performed by: FAMILY MEDICINE

## 2024-03-27 PROCEDURE — G8427 DOCREV CUR MEDS BY ELIG CLIN: HCPCS | Performed by: FAMILY MEDICINE

## 2024-03-27 PROCEDURE — 1123F ACP DISCUSS/DSCN MKR DOCD: CPT | Performed by: FAMILY MEDICINE

## 2024-03-27 PROCEDURE — 3017F COLORECTAL CA SCREEN DOC REV: CPT | Performed by: FAMILY MEDICINE

## 2024-03-27 PROCEDURE — G8484 FLU IMMUNIZE NO ADMIN: HCPCS | Performed by: FAMILY MEDICINE

## 2024-03-27 PROCEDURE — G8417 CALC BMI ABV UP PARAM F/U: HCPCS | Performed by: FAMILY MEDICINE

## 2024-03-27 RX ORDER — LISINOPRIL 20 MG/1
TABLET ORAL
Qty: 90 TABLET | Refills: 1 | Status: SHIPPED | OUTPATIENT
Start: 2024-03-27

## 2024-03-27 RX ORDER — AMLODIPINE BESYLATE 5 MG/1
5 TABLET ORAL EVERY MORNING
Qty: 90 TABLET | Refills: 1 | Status: SHIPPED | OUTPATIENT
Start: 2024-03-27

## 2024-03-27 RX ORDER — ALBUTEROL SULFATE 90 UG/1
2 AEROSOL, METERED RESPIRATORY (INHALATION) 4 TIMES DAILY PRN
Qty: 3 EACH | Refills: 1 | Status: SHIPPED | OUTPATIENT
Start: 2024-03-27

## 2024-03-27 RX ORDER — CARVEDILOL 12.5 MG/1
TABLET ORAL
Qty: 180 TABLET | Refills: 1 | Status: SHIPPED | OUTPATIENT
Start: 2024-03-27

## 2024-03-27 RX ORDER — EVOLOCUMAB 140 MG/ML
140 INJECTION, SOLUTION SUBCUTANEOUS
Qty: 2 ML | Refills: 5 | Status: SHIPPED | OUTPATIENT
Start: 2024-03-27

## 2024-03-27 RX ORDER — CLOPIDOGREL BISULFATE 75 MG/1
TABLET ORAL
Qty: 45 TABLET | Refills: 1 | Status: SHIPPED | OUTPATIENT
Start: 2024-03-27

## 2024-03-27 ASSESSMENT — ENCOUNTER SYMPTOMS
BLOOD IN STOOL: 0
TROUBLE SWALLOWING: 0
DIARRHEA: 0
ABDOMINAL PAIN: 0
VOMITING: 0
NAUSEA: 0
WHEEZING: 0
SHORTNESS OF BREATH: 0
EYE PAIN: 0
CHEST TIGHTNESS: 0

## 2024-03-27 NOTE — PROGRESS NOTES
Housing Stability: Unknown (2/8/2024)    Housing Stability Vital Sign     Unstable Housing in the Last Year: No        SURGICAL HISTORY  Past Surgical History:   Procedure Laterality Date    CARDIAC SURGERY  2014    CABG (4 Bypasses)    COLONOSCOPY  Last Done In 2019    Polyps Removed In Past    DENTAL SURGERY      Teeth Extracted In Past    FEMORAL BYPASS Right 3/25/2019    RIGHT FEMORAL POPLITEAL BYPASS performed by Romero Hitchcock MD at [facility] OR    LEG SURGERY Left 02/12/2019    \"Stent\"    SKIN CANCER EXCISION  1992    Basal Cell Skin Cancer Excised Nose    TONSILLECTOMY AND ADENOIDECTOMY  1950's                 CURRENT MEDICATIONS  Current Outpatient Medications   Medication Sig Dispense Refill    Evolocumab (REPATHA SURECLICK) 140 MG/ML SOAJ Inject 140 mg into the skin every 14 days 2 mL 5    tiotropium-olodaterol (STIOLTO RESPIMAT) 2.5-2.5 MCG/ACT AERS INHALE 2 PUFFS BY MOUTH ONCE DAILY 3 each 1    lisinopril (PRINIVIL;ZESTRIL) 20 MG tablet Take 1 tablet by mouth once daily 90 tablet 1    clopidogrel (PLAVIX) 75 MG tablet Take 1/2 (one-half) tablet by mouth once daily 45 tablet 1    carvedilol (COREG) 12.5 MG tablet TAKE 1 TABLET BY MOUTH TWICE DAILY WITH MEALS 180 tablet 1    amLODIPine (NORVASC) 5 MG tablet Take 1 tablet by mouth every morning 90 tablet 1    albuterol sulfate HFA (PROVENTIL;VENTOLIN;PROAIR) 108 (90 Base) MCG/ACT inhaler Inhale 2 puffs into the lungs 4 times daily as needed for Wheezing 3 each 1    ezetimibe (ZETIA) 10 MG tablet Take 1 tablet by mouth daily 30 tablet 5    aspirin 81 MG tablet Take 1 tablet by mouth nightly Over The Counter       No current facility-administered medications for this visit.       ALLERGIES  Allergies   Allergen Reactions    Statins      \"Paralyzes Muscles And Causes Muscle Weakness\"    Lansoprazole      dizziness    Lipitor [Atorvastatin Calcium]      Muscle weakness    Spiriva Handihaler [Tiotropium Bromide Monohydrate]      Hoarse voice       PHYSICAL EXAM    BP

## 2024-03-28 LAB
ALBUMIN SERPL-MCNC: 4.3 G/DL (ref 3.4–5)
ALBUMIN/GLOB SERPL: 1.4 {RATIO} (ref 1.1–2.2)
ALP SERPL-CCNC: 87 U/L (ref 40–129)
ALT SERPL-CCNC: 14 U/L (ref 10–40)
ANION GAP SERPL CALCULATED.3IONS-SCNC: 12 MMOL/L (ref 3–16)
AST SERPL-CCNC: 17 U/L (ref 15–37)
BILIRUB SERPL-MCNC: 0.4 MG/DL (ref 0–1)
BUN SERPL-MCNC: 20 MG/DL (ref 7–20)
CALCIUM SERPL-MCNC: 9.9 MG/DL (ref 8.3–10.6)
CHLORIDE SERPL-SCNC: 103 MMOL/L (ref 99–110)
CHOLEST SERPL-MCNC: 123 MG/DL (ref 0–199)
CO2 SERPL-SCNC: 27 MMOL/L (ref 21–32)
CREAT SERPL-MCNC: 1.1 MG/DL (ref 0.8–1.3)
EST. AVERAGE GLUCOSE BLD GHB EST-MCNC: 131.2 MG/DL
GFR SERPLBLD CREATININE-BSD FMLA CKD-EPI: 70 ML/MIN/{1.73_M2}
GLUCOSE SERPL-MCNC: 91 MG/DL (ref 70–99)
HBA1C MFR BLD: 6.2 %
HDLC SERPL-MCNC: 50 MG/DL (ref 40–60)
LDLC SERPL CALC-MCNC: 47 MG/DL
POTASSIUM SERPL-SCNC: 4.2 MMOL/L (ref 3.5–5.1)
PROT SERPL-MCNC: 7.4 G/DL (ref 6.4–8.2)
SODIUM SERPL-SCNC: 142 MMOL/L (ref 136–145)
TRIGL SERPL-MCNC: 132 MG/DL (ref 0–150)
VLDLC SERPL CALC-MCNC: 26 MG/DL

## 2024-04-15 ENCOUNTER — TELEPHONE (OUTPATIENT)
Dept: CARDIOLOGY CLINIC | Age: 74
End: 2024-04-15

## 2024-04-15 NOTE — TELEPHONE ENCOUNTER
Patient has been approved for rosaline renewal with the [employer]. Patient advised and voiced understanding.     CARD NO.  [id number]     CARD STATUS  Active     BIN  [id number]     PCN  [id number]      GROUP  [id number]    Previous rosaline will  on 2024.

## 2024-06-14 RX ORDER — EZETIMIBE 10 MG/1
10 TABLET ORAL DAILY
Qty: 30 TABLET | Refills: 5 | Status: SHIPPED | OUTPATIENT
Start: 2024-06-14

## 2024-06-25 ENCOUNTER — OFFICE VISIT (OUTPATIENT)
Dept: CARDIOLOGY CLINIC | Age: 74
End: 2024-06-25
Payer: MEDICARE

## 2024-06-25 VITALS
WEIGHT: 228.8 LBS | SYSTOLIC BLOOD PRESSURE: 138 MMHG | OXYGEN SATURATION: 98 % | HEIGHT: 71 IN | HEART RATE: 60 BPM | DIASTOLIC BLOOD PRESSURE: 74 MMHG | BODY MASS INDEX: 32.03 KG/M2

## 2024-06-25 DIAGNOSIS — I73.9 PERIPHERAL VASCULAR DISEASE, UNSPECIFIED (HCC): ICD-10-CM

## 2024-06-25 DIAGNOSIS — I25.10 ASCVD (ARTERIOSCLEROTIC CARDIOVASCULAR DISEASE): Primary | ICD-10-CM

## 2024-06-25 DIAGNOSIS — E78.5 HYPERLIPIDEMIA, UNSPECIFIED HYPERLIPIDEMIA TYPE: ICD-10-CM

## 2024-06-25 PROCEDURE — 3017F COLORECTAL CA SCREEN DOC REV: CPT | Performed by: NURSE PRACTITIONER

## 2024-06-25 PROCEDURE — 3075F SYST BP GE 130 - 139MM HG: CPT | Performed by: NURSE PRACTITIONER

## 2024-06-25 PROCEDURE — 3078F DIAST BP <80 MM HG: CPT | Performed by: NURSE PRACTITIONER

## 2024-06-25 PROCEDURE — G8417 CALC BMI ABV UP PARAM F/U: HCPCS | Performed by: NURSE PRACTITIONER

## 2024-06-25 PROCEDURE — 1123F ACP DISCUSS/DSCN MKR DOCD: CPT | Performed by: NURSE PRACTITIONER

## 2024-06-25 PROCEDURE — 1036F TOBACCO NON-USER: CPT | Performed by: NURSE PRACTITIONER

## 2024-06-25 PROCEDURE — 99214 OFFICE O/P EST MOD 30 MIN: CPT | Performed by: NURSE PRACTITIONER

## 2024-06-25 PROCEDURE — G8427 DOCREV CUR MEDS BY ELIG CLIN: HCPCS | Performed by: NURSE PRACTITIONER

## 2024-06-25 ASSESSMENT — ENCOUNTER SYMPTOMS
ORTHOPNEA: 0
SHORTNESS OF BREATH: 1

## 2024-06-25 NOTE — PROGRESS NOTES
6/25/2024  Primary cardiologist: Dr. Escalante    CC:   Edward  is an established 74 y.o.  male here for a follow up on CAD      SUBJECTIVE/OBJECTIVE:  Edward is a 74 y.o. male with a history of coronary artery disease, CABG x4, hypertension, hyperlipidemia, peripheral vascular disease, PTA of L SFA and right FP bypass, and COPD      HPI :   Edward  states overall he is feeling pretty good.  He does have some shortness of breath with overexertion and during the hot humid weather.  He is riding a bicycle for exercise.  Has no claudication symptoms    Review of Systems   Constitutional: Negative for diaphoresis and malaise/fatigue.   Cardiovascular:  Negative for chest pain, claudication, dyspnea on exertion, irregular heartbeat, leg swelling, near-syncope, orthopnea, palpitations and paroxysmal nocturnal dyspnea.   Respiratory:  Positive for shortness of breath.    Neurological:  Negative for dizziness and light-headedness.       Vitals:    06/25/24 1105   BP: 138/74   Site: Left Upper Arm   Position: Sitting   Cuff Size: Medium Adult   Pulse: 60   SpO2: 98%   Weight: 103.8 kg (228 lb 12.8 oz)   Height: 1.803 m (5' 11\")     Wt Readings from Last 3 Encounters:   06/25/24 103.8 kg (228 lb 12.8 oz)   03/27/24 104.6 kg (230 lb 8 oz)   12/22/23 104.1 kg (229 lb 6.4 oz)      Body mass index is 31.91 kg/m².     Physical Exam  Vitals reviewed.   Eyes:      Pupils: Pupils are equal, round, and reactive to light.   Neck:      Vascular: No carotid bruit.   Cardiovascular:      Rate and Rhythm: Normal rate and regular rhythm.      Pulses: Normal pulses.   Pulmonary:      Effort: Pulmonary effort is normal. No respiratory distress.      Breath sounds: No rales.   Musculoskeletal:      Right lower leg: No edema.      Left lower leg: No edema.   Skin:     General: Skin is warm and dry.      Capillary Refill: Capillary refill takes less than 2 seconds.   Neurological:      General: No focal deficit present.      Mental Status: He is

## 2024-07-29 ENCOUNTER — HOSPITAL ENCOUNTER (OUTPATIENT)
Age: 74
Discharge: HOME | End: 2024-07-29
Payer: MEDICARE

## 2024-07-29 ENCOUNTER — HOSPITAL ENCOUNTER (OUTPATIENT)
Dept: GENERAL RADIOLOGY | Age: 74
Discharge: HOME | End: 2024-07-29
Payer: MEDICARE

## 2024-07-29 ENCOUNTER — OFFICE VISIT (OUTPATIENT)
Dept: FAMILY MEDICINE CLINIC | Age: 74
End: 2024-07-29

## 2024-07-29 VITALS
HEART RATE: 68 BPM | RESPIRATION RATE: 18 BRPM | SYSTOLIC BLOOD PRESSURE: 140 MMHG | WEIGHT: 229.4 LBS | DIASTOLIC BLOOD PRESSURE: 80 MMHG | HEIGHT: 71 IN | BODY MASS INDEX: 32.11 KG/M2 | OXYGEN SATURATION: 95 %

## 2024-07-29 DIAGNOSIS — R73.9 HYPERGLYCEMIA: Primary | ICD-10-CM

## 2024-07-29 DIAGNOSIS — M47.817 SPONDYLOSIS OF LUMBOSACRAL REGION, UNSPECIFIED SPINAL OSTEOARTHRITIS COMPLICATION STATUS: ICD-10-CM

## 2024-07-29 DIAGNOSIS — J43.9 PULMONARY EMPHYSEMA, UNSPECIFIED EMPHYSEMA TYPE (HCC): ICD-10-CM

## 2024-07-29 DIAGNOSIS — I10 ESSENTIAL HYPERTENSION: ICD-10-CM

## 2024-07-29 DIAGNOSIS — N18.30 STAGE 3 CHRONIC KIDNEY DISEASE, UNSPECIFIED WHETHER STAGE 3A OR 3B CKD (HCC): ICD-10-CM

## 2024-07-29 DIAGNOSIS — I25.118 ATHEROSCLEROTIC HEART DISEASE OF NATIVE CORONARY ARTERY WITH OTHER FORMS OF ANGINA PECTORIS (HCC): ICD-10-CM

## 2024-07-29 DIAGNOSIS — R73.9 HYPERGLYCEMIA: ICD-10-CM

## 2024-07-29 LAB
ALBUMIN SERPL-MCNC: 4.2 G/DL (ref 3.4–5)
ALBUMIN/GLOB SERPL: 1.4 {RATIO} (ref 1.1–2.2)
ALP SERPL-CCNC: 85 U/L (ref 40–129)
ALT SERPL-CCNC: 14 U/L (ref 10–40)
ANION GAP SERPL CALCULATED.3IONS-SCNC: 13 MMOL/L (ref 3–16)
AST SERPL-CCNC: 17 U/L (ref 15–37)
BILIRUB SERPL-MCNC: 0.7 MG/DL (ref 0–1)
BUN SERPL-MCNC: 18 MG/DL (ref 7–20)
CALCIUM SERPL-MCNC: 9.2 MG/DL (ref 8.3–10.6)
CHLORIDE SERPL-SCNC: 101 MMOL/L (ref 99–110)
CO2 SERPL-SCNC: 22 MMOL/L (ref 21–32)
CREAT SERPL-MCNC: 1 MG/DL (ref 0.8–1.3)
DEPRECATED RDW RBC AUTO: 14.2 % (ref 12.4–15.4)
GFR SERPLBLD CREATININE-BSD FMLA CKD-EPI: 78 ML/MIN/{1.73_M2}
GLUCOSE SERPL-MCNC: 92 MG/DL (ref 70–99)
HCT VFR BLD AUTO: 46.7 % (ref 40.5–52.5)
HGB BLD-MCNC: 15.9 G/DL (ref 13.5–17.5)
MCH RBC QN AUTO: 32.8 PG (ref 26–34)
MCHC RBC AUTO-ENTMCNC: 33.9 G/DL (ref 31–36)
MCV RBC AUTO: 96.6 FL (ref 80–100)
PLATELET # BLD AUTO: 216 K/UL (ref 135–450)
PMV BLD AUTO: 6.8 FL (ref 5–10.5)
POTASSIUM SERPL-SCNC: 4.2 MMOL/L (ref 3.5–5.1)
PROT SERPL-MCNC: 7.2 G/DL (ref 6.4–8.2)
RBC # BLD AUTO: 4.84 M/UL (ref 4.2–5.9)
SODIUM SERPL-SCNC: 136 MMOL/L (ref 136–145)
WBC # BLD AUTO: 7.2 K/UL (ref 4–11)

## 2024-07-29 PROCEDURE — 72100 X-RAY EXAM L-S SPINE 2/3 VWS: CPT

## 2024-07-29 ASSESSMENT — ENCOUNTER SYMPTOMS
BACK PAIN: 1
EYE PAIN: 0
ABDOMINAL PAIN: 0
DIARRHEA: 0
CHEST TIGHTNESS: 0
SHORTNESS OF BREATH: 0
WHEEZING: 0
VOMITING: 0
NAUSEA: 0
TROUBLE SWALLOWING: 0
BLOOD IN STOOL: 0

## 2024-07-29 NOTE — PROGRESS NOTES
7/29/2024    Edward Hurtado    Chief Complaint   Patient presents with    4 month     Skin Problem     Spot 'mole' on middle of back. Wife states that it has changed and would like this looked at.     Lower Back Pain     Lower back pain x 6 months. Increases pain with activity and bending over. Taking aleve for pain        HPI  History was obtained from the patient.  Edward is a 74 y.o. male who presents today with routine recheck.  Patient with history of coronary disease and previous bypass grafting as well as hypertension, COPD, OA spine with low back pain (chronic), GERD, hyperglycemia, and CKD 3.  Trying to stay active although his wife has Parkinson's and he has to do a lot of the things around the house.  His wife do a little bit of bicycling.  Frustrated because he has gained a bit of weight since he retired.  Back pain is chronic and seems to be somewhat positional there is no radicular symptoms or bladder or bowel changes.  Mole on his back is consistent with benign seborrheic keratosis no treatment needed on review he was reminded to get appropriate seasonal shots this fall and will need lab work.  No refills needed at this point.    REVIEW OF SYMPTOMS    Review of Systems   Constitutional:  Negative for activity change and fatigue.   HENT:  Negative for congestion, hearing loss, mouth sores and trouble swallowing.    Eyes:  Negative for pain and visual disturbance.   Respiratory:  Negative for chest tightness, shortness of breath and wheezing.         Patient with known COPD.   Cardiovascular:  Negative for chest pain and palpitations.        Patient follows with cardiology known history of hypertension, coronary disease, previous bypass grafting.   Gastrointestinal:  Negative for abdominal pain, blood in stool, diarrhea, nausea and vomiting.        GERD has been stable.   Endocrine: Negative for polydipsia and polyuria.        Patient with history of   Genitourinary:  Negative for dysuria,

## 2024-07-30 LAB
EST. AVERAGE GLUCOSE BLD GHB EST-MCNC: 131.2 MG/DL
HBA1C MFR BLD: 6.2 %

## 2024-08-28 DIAGNOSIS — I10 ESSENTIAL HYPERTENSION: ICD-10-CM

## 2024-08-28 DIAGNOSIS — I25.10 ASCVD (ARTERIOSCLEROTIC CARDIOVASCULAR DISEASE): ICD-10-CM

## 2024-08-29 RX ORDER — EVOLOCUMAB 140 MG/ML
140 INJECTION, SOLUTION SUBCUTANEOUS
Qty: 2 ML | Refills: 5 | Status: SHIPPED | OUTPATIENT
Start: 2024-08-29

## 2024-11-07 ENCOUNTER — TELEPHONE (OUTPATIENT)
Dept: CARDIOLOGY CLINIC | Age: 74
End: 2024-11-07

## 2024-11-07 NOTE — TELEPHONE ENCOUNTER
PA Renewal Request submitted for Rep[employer] via [facility]. Authorization already on file for this request. Authorization starting on 10/01/2024 and ending on 12/31/2025.

## 2024-11-13 DIAGNOSIS — I10 ESSENTIAL HYPERTENSION: ICD-10-CM

## 2024-11-13 RX ORDER — CARVEDILOL 12.5 MG/1
TABLET ORAL
Qty: 180 TABLET | Refills: 1 | Status: SHIPPED | OUTPATIENT
Start: 2024-11-13

## 2024-11-20 DIAGNOSIS — I10 ESSENTIAL HYPERTENSION: ICD-10-CM

## 2024-11-21 RX ORDER — LISINOPRIL 20 MG/1
TABLET ORAL
Qty: 90 TABLET | Refills: 0 | Status: SHIPPED | OUTPATIENT
Start: 2024-11-21

## 2024-12-02 ENCOUNTER — OFFICE VISIT (OUTPATIENT)
Dept: FAMILY MEDICINE CLINIC | Age: 74
End: 2024-12-02
Payer: MEDICARE

## 2024-12-02 VITALS
SYSTOLIC BLOOD PRESSURE: 102 MMHG | DIASTOLIC BLOOD PRESSURE: 78 MMHG | OXYGEN SATURATION: 94 % | WEIGHT: 230.8 LBS | HEIGHT: 71 IN | RESPIRATION RATE: 16 BRPM | HEART RATE: 69 BPM | BODY MASS INDEX: 32.31 KG/M2

## 2024-12-02 DIAGNOSIS — I73.9 PERIPHERAL VASCULAR DISEASE, UNSPECIFIED (HCC): ICD-10-CM

## 2024-12-02 DIAGNOSIS — I10 ESSENTIAL HYPERTENSION: ICD-10-CM

## 2024-12-02 DIAGNOSIS — J43.9 PULMONARY EMPHYSEMA, UNSPECIFIED EMPHYSEMA TYPE (HCC): Primary | ICD-10-CM

## 2024-12-02 DIAGNOSIS — M47.817 SPONDYLOSIS OF LUMBOSACRAL REGION, UNSPECIFIED SPINAL OSTEOARTHRITIS COMPLICATION STATUS: ICD-10-CM

## 2024-12-02 DIAGNOSIS — I25.118 ATHEROSCLEROTIC HEART DISEASE OF NATIVE CORONARY ARTERY WITH OTHER FORMS OF ANGINA PECTORIS (HCC): ICD-10-CM

## 2024-12-02 DIAGNOSIS — R73.9 HYPERGLYCEMIA: ICD-10-CM

## 2024-12-02 DIAGNOSIS — N18.30 STAGE 3 CHRONIC KIDNEY DISEASE, UNSPECIFIED WHETHER STAGE 3A OR 3B CKD (HCC): ICD-10-CM

## 2024-12-02 DIAGNOSIS — M72.0 DUPUYTREN CONTRACTURE OF BOTH HANDS: ICD-10-CM

## 2024-12-02 PROCEDURE — G8427 DOCREV CUR MEDS BY ELIG CLIN: HCPCS | Performed by: FAMILY MEDICINE

## 2024-12-02 PROCEDURE — 3078F DIAST BP <80 MM HG: CPT | Performed by: FAMILY MEDICINE

## 2024-12-02 PROCEDURE — 99214 OFFICE O/P EST MOD 30 MIN: CPT | Performed by: FAMILY MEDICINE

## 2024-12-02 PROCEDURE — 3017F COLORECTAL CA SCREEN DOC REV: CPT | Performed by: FAMILY MEDICINE

## 2024-12-02 PROCEDURE — 1123F ACP DISCUSS/DSCN MKR DOCD: CPT | Performed by: FAMILY MEDICINE

## 2024-12-02 PROCEDURE — G8484 FLU IMMUNIZE NO ADMIN: HCPCS | Performed by: FAMILY MEDICINE

## 2024-12-02 PROCEDURE — 1036F TOBACCO NON-USER: CPT | Performed by: FAMILY MEDICINE

## 2024-12-02 PROCEDURE — 1159F MED LIST DOCD IN RCRD: CPT | Performed by: FAMILY MEDICINE

## 2024-12-02 PROCEDURE — 3023F SPIROM DOC REV: CPT | Performed by: FAMILY MEDICINE

## 2024-12-02 PROCEDURE — G8417 CALC BMI ABV UP PARAM F/U: HCPCS | Performed by: FAMILY MEDICINE

## 2024-12-02 PROCEDURE — 3074F SYST BP LT 130 MM HG: CPT | Performed by: FAMILY MEDICINE

## 2024-12-02 RX ORDER — ALBUTEROL SULFATE 90 UG/1
2 INHALANT RESPIRATORY (INHALATION) 4 TIMES DAILY PRN
Qty: 3 EACH | Refills: 1 | Status: SHIPPED | OUTPATIENT
Start: 2024-12-02

## 2024-12-02 RX ORDER — LISINOPRIL 20 MG/1
TABLET ORAL
Qty: 90 TABLET | Refills: 0 | Status: SHIPPED | OUTPATIENT
Start: 2024-12-02

## 2024-12-02 RX ORDER — AMLODIPINE BESYLATE 5 MG/1
5 TABLET ORAL EVERY MORNING
Qty: 90 TABLET | Refills: 1 | Status: SHIPPED | OUTPATIENT
Start: 2024-12-02

## 2024-12-02 RX ORDER — CLOPIDOGREL BISULFATE 75 MG/1
TABLET ORAL
Qty: 45 TABLET | Refills: 1 | Status: SHIPPED | OUTPATIENT
Start: 2024-12-02

## 2024-12-02 RX ORDER — CARVEDILOL 12.5 MG/1
TABLET ORAL
Qty: 180 TABLET | Refills: 1 | Status: SHIPPED | OUTPATIENT
Start: 2024-12-02

## 2024-12-02 ASSESSMENT — ENCOUNTER SYMPTOMS
VOMITING: 0
DIARRHEA: 0
CHEST TIGHTNESS: 0
NAUSEA: 0
SHORTNESS OF BREATH: 0
TROUBLE SWALLOWING: 0
ABDOMINAL PAIN: 0
EYE PAIN: 0
BLOOD IN STOOL: 0
WHEEZING: 0

## 2024-12-02 NOTE — PROGRESS NOTES
Nerves: No cranial nerve deficit.      Motor: No weakness.   Psychiatric:         Mood and Affect: Mood normal.         Behavior: Behavior normal.         Thought Content: Thought content normal.         ASSESSMENT & PLAN     Diagnosis Orders   1. Pulmonary emphysema, unspecified emphysema type ([facility])  albuterol sulfate HFA (PROVENTIL;VENTOLIN;PROAIR) 108 (90 Base) MCG/ACT inhaler      2. Essential hypertension  lisinopril (PRINIVIL;ZESTRIL) 20 MG tablet    carvedilol (COREG) 12.5 MG tablet    amLODIPine (NORVASC) 5 MG tablet      3. Peripheral vascular disease, unspecified ([facility])        4. Hyperglycemia        5. Spondylosis of lumbosacral region, unspecified spinal osteoarthritis complication status        6. Stage 3 chronic kidney disease, unspecified whether stage 3a or 3b CKD ([facility])        7. Atherosclerotic heart disease of native coronary artery with other forms of angina pectoris ([facility])  clopidogrel (PLAVIX) 75 MG tablet      8. Dupuytren contracture of both hands  External Referral To Orthopedic Surgery      Patient to continue with healthy lifestyle and keep appointments with specialists.  Med list reviewed and refills provided.  Will make referral to hand specialist Dr. Humphrey for bilateral fourth digit Dupuytren's contractures.  Advised to get RSV vaccine at pharmacy.  Call with other changes or issues.    Return in about 4 months (around 4/2/2025).         Electronically signed by FRANKIE VASQUEZ MD on 12/2/2024

## 2024-12-13 RX ORDER — EZETIMIBE 10 MG/1
10 TABLET ORAL DAILY
Qty: 30 TABLET | Refills: 5 | Status: SHIPPED | OUTPATIENT
Start: 2024-12-13

## 2024-12-20 ENCOUNTER — OFFICE VISIT (OUTPATIENT)
Dept: CARDIOLOGY CLINIC | Age: 74
End: 2024-12-20

## 2024-12-20 VITALS
SYSTOLIC BLOOD PRESSURE: 138 MMHG | DIASTOLIC BLOOD PRESSURE: 76 MMHG | HEIGHT: 71 IN | WEIGHT: 231.6 LBS | BODY MASS INDEX: 32.42 KG/M2 | HEART RATE: 63 BPM

## 2024-12-20 DIAGNOSIS — I10 ESSENTIAL HYPERTENSION: Primary | ICD-10-CM

## 2024-12-20 DIAGNOSIS — I73.9 PVD (PERIPHERAL VASCULAR DISEASE) (HCC): ICD-10-CM

## 2024-12-23 ENCOUNTER — TELEPHONE (OUTPATIENT)
Dept: CARDIOLOGY CLINIC | Age: 74
End: 2024-12-23

## 2025-01-09 ENCOUNTER — TELEPHONE (OUTPATIENT)
Dept: CARDIOLOGY CLINIC | Age: 75
End: 2025-01-09

## 2025-01-09 NOTE — TELEPHONE ENCOUNTER
Notified     Vascular US Duplex Lower Extremity Arteries Bilateral     Right Fem-Pop Bypass is patent with multiphasic flow.    Right and Left SHARON are normal.    Less than 50% stenosis of the left proximal SFA.

## 2025-02-19 DIAGNOSIS — I10 ESSENTIAL HYPERTENSION: ICD-10-CM

## 2025-02-19 DIAGNOSIS — I25.10 ASCVD (ARTERIOSCLEROTIC CARDIOVASCULAR DISEASE): ICD-10-CM

## 2025-02-19 RX ORDER — EVOLOCUMAB 140 MG/ML
140 INJECTION, SOLUTION SUBCUTANEOUS
Qty: 2 ML | Refills: 5 | Status: SHIPPED | OUTPATIENT
Start: 2025-02-19

## 2025-04-02 ENCOUNTER — OFFICE VISIT (OUTPATIENT)
Dept: FAMILY MEDICINE CLINIC | Age: 75
End: 2025-04-02
Payer: MEDICARE

## 2025-04-02 VITALS
HEART RATE: 68 BPM | RESPIRATION RATE: 18 BRPM | DIASTOLIC BLOOD PRESSURE: 80 MMHG | HEIGHT: 71 IN | SYSTOLIC BLOOD PRESSURE: 138 MMHG | OXYGEN SATURATION: 95 % | BODY MASS INDEX: 31.86 KG/M2 | WEIGHT: 227.6 LBS

## 2025-04-02 DIAGNOSIS — K21.9 GASTROESOPHAGEAL REFLUX DISEASE, UNSPECIFIED WHETHER ESOPHAGITIS PRESENT: ICD-10-CM

## 2025-04-02 DIAGNOSIS — E78.5 HYPERLIPIDEMIA, UNSPECIFIED HYPERLIPIDEMIA TYPE: ICD-10-CM

## 2025-04-02 DIAGNOSIS — I10 ESSENTIAL HYPERTENSION: ICD-10-CM

## 2025-04-02 DIAGNOSIS — N18.30 STAGE 3 CHRONIC KIDNEY DISEASE, UNSPECIFIED WHETHER STAGE 3A OR 3B CKD (HCC): ICD-10-CM

## 2025-04-02 DIAGNOSIS — M47.817 SPONDYLOSIS OF LUMBOSACRAL REGION, UNSPECIFIED SPINAL OSTEOARTHRITIS COMPLICATION STATUS: ICD-10-CM

## 2025-04-02 DIAGNOSIS — R73.9 HYPERGLYCEMIA: ICD-10-CM

## 2025-04-02 DIAGNOSIS — I25.10 ASCVD (ARTERIOSCLEROTIC CARDIOVASCULAR DISEASE): Primary | ICD-10-CM

## 2025-04-02 DIAGNOSIS — J43.9 PULMONARY EMPHYSEMA, UNSPECIFIED EMPHYSEMA TYPE (HCC): ICD-10-CM

## 2025-04-02 LAB
ALBUMIN SERPL-MCNC: 4.3 G/DL (ref 3.4–5)
ALBUMIN/GLOB SERPL: 1.5 {RATIO} (ref 1.1–2.2)
ALP SERPL-CCNC: 82 U/L (ref 40–129)
ALT SERPL-CCNC: 18 U/L (ref 10–40)
ANION GAP SERPL CALCULATED.3IONS-SCNC: 9 MMOL/L (ref 3–16)
AST SERPL-CCNC: 21 U/L (ref 15–37)
BILIRUB SERPL-MCNC: 0.6 MG/DL (ref 0–1)
BUN SERPL-MCNC: 19 MG/DL (ref 7–20)
CALCIUM SERPL-MCNC: 9.6 MG/DL (ref 8.3–10.6)
CHLORIDE SERPL-SCNC: 100 MMOL/L (ref 99–110)
CHOLEST SERPL-MCNC: 115 MG/DL (ref 0–199)
CO2 SERPL-SCNC: 28 MMOL/L (ref 21–32)
CREAT SERPL-MCNC: 1.1 MG/DL (ref 0.8–1.3)
DEPRECATED RDW RBC AUTO: 14.5 % (ref 12.4–15.4)
EST. AVERAGE GLUCOSE BLD GHB EST-MCNC: 131.2 MG/DL
GFR SERPLBLD CREATININE-BSD FMLA CKD-EPI: 70 ML/MIN/{1.73_M2}
GLUCOSE SERPL-MCNC: 100 MG/DL (ref 70–99)
HBA1C MFR BLD: 6.2 %
HCT VFR BLD AUTO: 45.9 % (ref 40.5–52.5)
HDLC SERPL-MCNC: 48 MG/DL (ref 40–60)
HGB BLD-MCNC: 15.6 G/DL (ref 13.5–17.5)
LDLC SERPL CALC-MCNC: 49 MG/DL
MCH RBC QN AUTO: 33 PG (ref 26–34)
MCHC RBC AUTO-ENTMCNC: 34.1 G/DL (ref 31–36)
MCV RBC AUTO: 97 FL (ref 80–100)
PLATELET # BLD AUTO: 232 K/UL (ref 135–450)
PMV BLD AUTO: 7 FL (ref 5–10.5)
POTASSIUM SERPL-SCNC: 4.2 MMOL/L (ref 3.5–5.1)
PROT SERPL-MCNC: 7.2 G/DL (ref 6.4–8.2)
RBC # BLD AUTO: 4.73 M/UL (ref 4.2–5.9)
SODIUM SERPL-SCNC: 137 MMOL/L (ref 136–145)
TRIGL SERPL-MCNC: 89 MG/DL (ref 0–150)
VLDLC SERPL CALC-MCNC: 18 MG/DL
WBC # BLD AUTO: 7.4 K/UL (ref 4–11)

## 2025-04-02 PROCEDURE — 1036F TOBACCO NON-USER: CPT | Performed by: FAMILY MEDICINE

## 2025-04-02 PROCEDURE — G8427 DOCREV CUR MEDS BY ELIG CLIN: HCPCS | Performed by: FAMILY MEDICINE

## 2025-04-02 PROCEDURE — G8417 CALC BMI ABV UP PARAM F/U: HCPCS | Performed by: FAMILY MEDICINE

## 2025-04-02 PROCEDURE — 99214 OFFICE O/P EST MOD 30 MIN: CPT | Performed by: FAMILY MEDICINE

## 2025-04-02 PROCEDURE — 3075F SYST BP GE 130 - 139MM HG: CPT | Performed by: FAMILY MEDICINE

## 2025-04-02 PROCEDURE — 1123F ACP DISCUSS/DSCN MKR DOCD: CPT | Performed by: FAMILY MEDICINE

## 2025-04-02 PROCEDURE — 3079F DIAST BP 80-89 MM HG: CPT | Performed by: FAMILY MEDICINE

## 2025-04-02 PROCEDURE — 3023F SPIROM DOC REV: CPT | Performed by: FAMILY MEDICINE

## 2025-04-02 PROCEDURE — 1159F MED LIST DOCD IN RCRD: CPT | Performed by: FAMILY MEDICINE

## 2025-04-02 SDOH — ECONOMIC STABILITY: FOOD INSECURITY: WITHIN THE PAST 12 MONTHS, THE FOOD YOU BOUGHT JUST DIDN'T LAST AND YOU DIDN'T HAVE MONEY TO GET MORE.: NEVER TRUE

## 2025-04-02 SDOH — ECONOMIC STABILITY: FOOD INSECURITY: WITHIN THE PAST 12 MONTHS, YOU WORRIED THAT YOUR FOOD WOULD RUN OUT BEFORE YOU GOT MONEY TO BUY MORE.: NEVER TRUE

## 2025-04-02 ASSESSMENT — ENCOUNTER SYMPTOMS
CHEST TIGHTNESS: 0
EYE PAIN: 0
BLOOD IN STOOL: 0
WHEEZING: 0
ABDOMINAL PAIN: 0
NAUSEA: 0
SHORTNESS OF BREATH: 0
TROUBLE SWALLOWING: 0
VOMITING: 0
DIARRHEA: 0

## 2025-04-02 ASSESSMENT — PATIENT HEALTH QUESTIONNAIRE - PHQ9
5. POOR APPETITE OR OVEREATING: SEVERAL DAYS
3. TROUBLE FALLING OR STAYING ASLEEP: NOT AT ALL
SUM OF ALL RESPONSES TO PHQ QUESTIONS 1-9: 5
9. THOUGHTS THAT YOU WOULD BE BETTER OFF DEAD, OR OF HURTING YOURSELF: NOT AT ALL
6. FEELING BAD ABOUT YOURSELF - OR THAT YOU ARE A FAILURE OR HAVE LET YOURSELF OR YOUR FAMILY DOWN: SEVERAL DAYS
4. FEELING TIRED OR HAVING LITTLE ENERGY: SEVERAL DAYS
1. LITTLE INTEREST OR PLEASURE IN DOING THINGS: NOT AT ALL
8. MOVING OR SPEAKING SO SLOWLY THAT OTHER PEOPLE COULD HAVE NOTICED. OR THE OPPOSITE, BEING SO FIGETY OR RESTLESS THAT YOU HAVE BEEN MOVING AROUND A LOT MORE THAN USUAL: NOT AT ALL
SUM OF ALL RESPONSES TO PHQ QUESTIONS 1-9: 5
7. TROUBLE CONCENTRATING ON THINGS, SUCH AS READING THE NEWSPAPER OR WATCHING TELEVISION: NOT AT ALL
SUM OF ALL RESPONSES TO PHQ QUESTIONS 1-9: 5
SUM OF ALL RESPONSES TO PHQ QUESTIONS 1-9: 5
10. IF YOU CHECKED OFF ANY PROBLEMS, HOW DIFFICULT HAVE THESE PROBLEMS MADE IT FOR YOU TO DO YOUR WORK, TAKE CARE OF THINGS AT HOME, OR GET ALONG WITH OTHER PEOPLE: NOT DIFFICULT AT ALL
2. FEELING DOWN, DEPRESSED OR HOPELESS: MORE THAN HALF THE DAYS

## 2025-04-02 NOTE — PROGRESS NOTES
4/2/2025    Edward Hurtado    Chief Complaint   Patient presents with    4 month     4 month. No complaints.     Health Maintenance     Declines AWV.       HPI  History was obtained from the patient.  Edward is a 75 y.o. male who presents today with routine recheck.  He has past history of hypertension, coronary artery disease with bypass grafting, COPD, GERD, hyperlipidemia, hyperglycemia, and CKD 3.  He was trying to remain active denies chest pain or shortness of breath does need to increase exercise but is under increased stress taking care of his wife who has slowly progressive Parkinson's.  His last labs were in July 2024 they included CMP, CBC and A1c..    REVIEW OF SYMPTOMS    Review of Systems   Constitutional:  Negative for activity change and fatigue.   HENT:  Negative for congestion, hearing loss, mouth sores and trouble swallowing.    Eyes:  Negative for pain and visual disturbance.   Respiratory:  Negative for chest tightness, shortness of breath and wheezing.         COPD has been stable   Cardiovascular:  Negative for chest pain and palpitations.        Patient with stable hypertension as well as coronary disease and hyperlipidemia   Gastrointestinal:  Negative for abdominal pain, blood in stool, diarrhea, nausea and vomiting.   Endocrine: Negative for polydipsia.        Patient with history of hyperglycemia.   Genitourinary:  Negative for dysuria, frequency and urgency.   Musculoskeletal:  Negative for arthralgias, gait problem and neck stiffness.   Skin:  Negative for rash.   Allergic/Immunologic: Negative for environmental allergies.   Neurological:  Negative for dizziness, seizures, speech difficulty and weakness.   Hematological:  Does not bruise/bleed easily.   Psychiatric/Behavioral:  Negative for agitation, confusion and hallucinations.        PAST MEDICAL HISTORY  Past Medical History:   Diagnosis Date    Acid reflux     Basal Cell Skin Cancer Excised Nose 1992    Basal Cell Skin

## 2025-04-04 ENCOUNTER — RESULTS FOLLOW-UP (OUTPATIENT)
Dept: FAMILY MEDICINE CLINIC | Age: 75
End: 2025-04-04

## 2025-04-14 ENCOUNTER — TELEPHONE (OUTPATIENT)
Dept: CARDIOLOGY CLINIC | Age: 75
End: 2025-04-14

## 2025-04-14 NOTE — TELEPHONE ENCOUNTER
Received renewal notification from the [employer]. Patient requests renewal and has been approved. Will notify pharmacy on start date 5/12/2025.    Card No.  [id number]    Card Status  Active    BIN  [id number]    PCN  [id number]     Group  [id number]    [address] P [phone]

## 2025-05-09 DIAGNOSIS — I10 ESSENTIAL HYPERTENSION: ICD-10-CM

## 2025-05-12 RX ORDER — LISINOPRIL 20 MG/1
20 TABLET ORAL DAILY
Qty: 90 TABLET | Refills: 1 | Status: SHIPPED | OUTPATIENT
Start: 2025-05-12

## 2025-06-06 RX ORDER — EZETIMIBE 10 MG/1
10 TABLET ORAL DAILY
Qty: 30 TABLET | Refills: 5 | Status: SHIPPED | OUTPATIENT
Start: 2025-06-06

## 2025-06-09 ENCOUNTER — OFFICE VISIT (OUTPATIENT)
Dept: CARDIOLOGY CLINIC | Age: 75
End: 2025-06-09
Payer: MEDICARE

## 2025-06-09 VITALS
BODY MASS INDEX: 32.06 KG/M2 | DIASTOLIC BLOOD PRESSURE: 70 MMHG | HEIGHT: 71 IN | WEIGHT: 229 LBS | HEART RATE: 72 BPM | SYSTOLIC BLOOD PRESSURE: 124 MMHG

## 2025-06-09 DIAGNOSIS — Z95.1 S/P CABG X 4: Primary | ICD-10-CM

## 2025-06-09 PROCEDURE — 99214 OFFICE O/P EST MOD 30 MIN: CPT | Performed by: INTERNAL MEDICINE

## 2025-06-09 PROCEDURE — G8417 CALC BMI ABV UP PARAM F/U: HCPCS | Performed by: INTERNAL MEDICINE

## 2025-06-09 PROCEDURE — 1159F MED LIST DOCD IN RCRD: CPT | Performed by: INTERNAL MEDICINE

## 2025-06-09 PROCEDURE — 1123F ACP DISCUSS/DSCN MKR DOCD: CPT | Performed by: INTERNAL MEDICINE

## 2025-06-09 PROCEDURE — 3074F SYST BP LT 130 MM HG: CPT | Performed by: INTERNAL MEDICINE

## 2025-06-09 PROCEDURE — 3078F DIAST BP <80 MM HG: CPT | Performed by: INTERNAL MEDICINE

## 2025-06-09 PROCEDURE — G8427 DOCREV CUR MEDS BY ELIG CLIN: HCPCS | Performed by: INTERNAL MEDICINE

## 2025-06-09 PROCEDURE — 1036F TOBACCO NON-USER: CPT | Performed by: INTERNAL MEDICINE

## 2025-06-09 NOTE — PROGRESS NOTES
CARDIOLOGY NOTE      6/9/2025    RE: Edward Hurtado  (1950)                               TO:  Dr. Duarte, Kurt Vu MD            CHIEF COMPLAINT   Edward is a 75 y.o. male who was seen today for management of  PVD, cad                                    :   Patient is here for    - Coronary artery disease, does not have chest pain. Patient is  compliant with prescribed medicines.   - Hypertension,is  well controlled, pt is  compliant with medicines  - Hyperlipidimea, lipids are in acceptable range. Pt  is  compliant with medicines  - PVD  stable                 The patient has no cardiac compalins  Edward Hurtado has the following history recorded in care path:  Patient Active Problem List    Diagnosis Date Noted    ASCVD (arteriosclerotic cardiovascular disease) 02/28/2023    Chronic renal disease, stage III ([facility]) [698180] 08/22/2022    Hyperlipidemia     GERD (gastroesophageal reflux disease) 08/28/2020    Hyperglycemia 08/28/2020    Fatigue 09/17/2019    Shortness of breath 09/17/2019    COPD (chronic obstructive pulmonary disease) ([facility])     Lumbosacral spondylosis     Primary erectile dysfunction     Peripheral vascular disease, unspecified 03/26/2019    Atherosclerotic heart disease of native coronary artery with other forms of angina pectoris 06/29/2018    H/O cardiovascular stress test 05/29/2014    S/P CABG x 4 05/05/2014    History of percutaneous left heart catheterization 05/03/2014    Essential hypertension 05/02/2014    Tobacco abuse 05/02/2014     Current Outpatient Medications   Medication Sig Dispense Refill    ezetimibe (ZETIA) 10 MG tablet Take 1 tablet by mouth daily 30 tablet 5    lisinopril (PRINIVIL;ZESTRIL) 20 MG tablet Take 1 tablet by mouth once daily 90 tablet 1    Evolocumab (REPATHA SURECLICK) 140 MG/ML SOAJ Inject 140 mg into the skin every 14 days 2 mL 5    tiotropium-olodaterol (STIOLTO RESPIMAT) 2.5-2.5 MCG/ACT AERS INHALE 2 PUFFS BY MOUTH ONCE DAILY

## 2025-06-09 NOTE — PATIENT INSTRUCTIONS
Thank you for allowing us to care for you today!   We want to ensure we can follow your treatment plan and we strive to give you the best outcomes and experience possible.   If you ever have a life threatening emergency and call 911 - for an ambulance (EMS)  REMEMBER  Our providers can only care for you at:   [facility] or [facility]   Even if you have someone take you or you drive yourself we can only care for you in a [facility] facility. Our providers are not setup at the other healthcare locations!    PLEASE CALL OUR OFFICE DURING NORMAL BUSINESS HOURS  Monday through Friday 8 am to 5 pm  AFTER HOURS the physician on-call cannot help with scheduling, rescheduling, procedure instruction questions or any type of medication need or issue.  [facility] P:[phone] - [facility] P:[phone] - [facility] P:[phone]      If you receive a survey:  We would appreciate you taking the time to share your experience concerning your provider visit in the office.    These surveys are confidential!  We are eager to improve and are counting on you to share your feedback so we can ensure you get the best care possible.

## 2025-06-09 NOTE — PROGRESS NOTES
CLINICAL STAFF DOCUMENTATION    Dr. Daquan Hurtado  1950  [id number]    Have you had any Chest Pain recently? - No    Have you had any Shortness of Breath - Yes NOT NEW  When did it begin? - Years   If Yes - When at rest and on exertion  How many flights of stairs can you go up without having SOB? - 2  Are you on Oxygen during the day or at night? - No  How many liters of Oxygen are you on? -   How many pillows do you sleep with under your head? - 1    Have you had any dizziness - Yes  If Yes DO ORTHOSTATIC BP including pulse  When do you feel dizzy? with position change  Does the room spin? No  How long does it last .5  minutes     Have patient lie down for 5 minutes then measure blood pressure and pulse rate.   Have the patient stand.   Repeat blood pressure and pulse rate after patient has been standing for 1 minute   Repeat blood pressure and pulse rate after patient has been standing for 3 minutes.   Be sure to ask what symptoms they are having if they get dizzy while completing ortho stats such as: room spinning, nausea, etc.    Have you had any palpitations recently? - No  Any thyroid issues? - No    Do you have any edema - swelling in RT SOMETIMES    If Yes - CHECK TO SEE IF THE EDEMA IS PITTING  How long have they been having edema - Years  If Yes - Have they worn compression stockings   If they have worn compression stockings    Is the patient on any of the following medications -   If Yes DO EKG - Needs done every 3 months    When did you have your last labs drawn 2025  What doctor ordered   Do we have the labs in their chart Yes  If we do not have the labs, ask where they were drawn     If we do not have these labs, you are retrieve these labs for the provider!    Do you need any prescriptions refilled? - No    Do you have a surgery or procedure scheduled in the near future - No  Do use tobacco products? - No  Do you drink alcohol? - No  Do you use any illicit drugs? -

## 2025-07-10 DIAGNOSIS — I10 ESSENTIAL HYPERTENSION: ICD-10-CM

## 2025-07-10 DIAGNOSIS — I25.10 ASCVD (ARTERIOSCLEROTIC CARDIOVASCULAR DISEASE): ICD-10-CM

## 2025-07-10 RX ORDER — EVOLOCUMAB 140 MG/ML
140 INJECTION, SOLUTION SUBCUTANEOUS
Qty: 2 ML | Refills: 5 | Status: SHIPPED | OUTPATIENT
Start: 2025-07-10

## 2025-07-27 DIAGNOSIS — I25.118 ATHEROSCLEROTIC HEART DISEASE OF NATIVE CORONARY ARTERY WITH OTHER FORMS OF ANGINA PECTORIS: ICD-10-CM

## 2025-07-30 RX ORDER — CLOPIDOGREL BISULFATE 75 MG/1
TABLET ORAL
Qty: 45 TABLET | Refills: 0 | Status: SHIPPED | OUTPATIENT
Start: 2025-07-30

## 2025-08-05 ENCOUNTER — OFFICE VISIT (OUTPATIENT)
Dept: FAMILY MEDICINE CLINIC | Age: 75
End: 2025-08-05

## 2025-08-05 VITALS
OXYGEN SATURATION: 90 % | WEIGHT: 228.7 LBS | DIASTOLIC BLOOD PRESSURE: 82 MMHG | HEART RATE: 65 BPM | HEIGHT: 71 IN | SYSTOLIC BLOOD PRESSURE: 118 MMHG | BODY MASS INDEX: 32.02 KG/M2

## 2025-08-05 DIAGNOSIS — I10 ESSENTIAL HYPERTENSION: ICD-10-CM

## 2025-08-05 DIAGNOSIS — Z76.89 ENCOUNTER TO ESTABLISH CARE: ICD-10-CM

## 2025-08-05 DIAGNOSIS — Z23 INFLUENZA VACCINE NEEDED: ICD-10-CM

## 2025-08-05 DIAGNOSIS — E78.5 HYPERLIPIDEMIA, UNSPECIFIED HYPERLIPIDEMIA TYPE: ICD-10-CM

## 2025-08-05 DIAGNOSIS — N28.1 BENIGN CYST OF LEFT KIDNEY: ICD-10-CM

## 2025-08-05 DIAGNOSIS — Z87.891 FORMER SMOKER: ICD-10-CM

## 2025-08-05 DIAGNOSIS — Z95.1 S/P CABG X 4: ICD-10-CM

## 2025-08-05 DIAGNOSIS — I25.10 ASCVD (ARTERIOSCLEROTIC CARDIOVASCULAR DISEASE): ICD-10-CM

## 2025-08-05 DIAGNOSIS — N18.30 STAGE 3 CHRONIC KIDNEY DISEASE, UNSPECIFIED WHETHER STAGE 3A OR 3B CKD (HCC): ICD-10-CM

## 2025-08-05 DIAGNOSIS — J43.9 PULMONARY EMPHYSEMA, UNSPECIFIED EMPHYSEMA TYPE (HCC): Primary | ICD-10-CM

## 2025-08-05 DIAGNOSIS — M72.0 DUPUYTREN CONTRACTURE OF BOTH HANDS: ICD-10-CM

## 2025-08-05 DIAGNOSIS — R73.03 PREDIABETES: ICD-10-CM

## 2025-08-05 PROBLEM — J44.9 COPD (CHRONIC OBSTRUCTIVE PULMONARY DISEASE) (HCC): Status: ACTIVE | Noted: 2019-09-17

## 2025-08-05 ASSESSMENT — ENCOUNTER SYMPTOMS
ABDOMINAL PAIN: 0
SHORTNESS OF BREATH: 0

## 2025-08-12 ENCOUNTER — HOSPITAL ENCOUNTER (OUTPATIENT)
Dept: LAB | Age: 75
Discharge: HOME | End: 2025-08-12
Payer: MEDICARE

## 2025-08-12 ENCOUNTER — HOSPITAL ENCOUNTER (OUTPATIENT)
Dept: CT IMAGING | Age: 75
Discharge: HOME | End: 2025-08-12
Payer: MEDICARE

## 2025-08-12 DIAGNOSIS — R73.03 PREDIABETES: ICD-10-CM

## 2025-08-12 DIAGNOSIS — Z87.891 FORMER SMOKER: ICD-10-CM

## 2025-08-12 DIAGNOSIS — J43.9 PULMONARY EMPHYSEMA, UNSPECIFIED EMPHYSEMA TYPE (HCC): ICD-10-CM

## 2025-08-12 DIAGNOSIS — N18.30 STAGE 3 CHRONIC KIDNEY DISEASE, UNSPECIFIED WHETHER STAGE 3A OR 3B CKD (HCC): ICD-10-CM

## 2025-08-12 DIAGNOSIS — I10 ESSENTIAL HYPERTENSION: ICD-10-CM

## 2025-08-12 LAB
ANION GAP SERPL CALCULATED.3IONS-SCNC: 12 MMOL/L (ref 9–17)
BUN SERPL-MCNC: 21 MG/DL (ref 7–20)
CALCIUM SERPL-MCNC: 9.5 MG/DL (ref 8.3–10.6)
CHLORIDE SERPL-SCNC: 103 MMOL/L (ref 99–110)
CO2 SERPL-SCNC: 24 MMOL/L (ref 21–32)
CREAT SERPL-MCNC: 1 MG/DL (ref 0.8–1.3)
EST. AVERAGE GLUCOSE BLD GHB EST-MCNC: 141 MG/DL
GFR, ESTIMATED: 69 ML/MIN/1.73M2
GLUCOSE SERPL-MCNC: 108 MG/DL (ref 74–99)
HBA1C MFR BLD: 6.5 % (ref 4.2–6.3)
POTASSIUM SERPL-SCNC: 4.2 MMOL/L (ref 3.5–5.1)
SODIUM SERPL-SCNC: 138 MMOL/L (ref 136–145)

## 2025-08-12 PROCEDURE — 83036 HEMOGLOBIN GLYCOSYLATED A1C: CPT

## 2025-08-12 PROCEDURE — 71271 CT THORAX LUNG CANCER SCR C-: CPT

## 2025-08-12 PROCEDURE — 80048 BASIC METABOLIC PNL TOTAL CA: CPT

## 2025-08-13 ENCOUNTER — TELEPHONE (OUTPATIENT)
Dept: FAMILY MEDICINE CLINIC | Age: 75
End: 2025-08-13

## 2025-08-13 DIAGNOSIS — R73.03 PREDIABETES: ICD-10-CM

## 2025-08-13 RX ORDER — AVOBENZONE, HOMOSALATE, OCTISALATE, OCTOCRYLENE 30; 40; 45; 26 MG/ML; MG/ML; MG/ML; MG/ML
CREAM TOPICAL
Qty: 100 EACH | Refills: 3 | Status: SHIPPED | OUTPATIENT
Start: 2025-08-13

## 2025-08-13 RX ORDER — BLOOD-GLUCOSE METER
1 KIT MISCELLANEOUS DAILY
Qty: 1 KIT | Refills: 0 | Status: SHIPPED | OUTPATIENT
Start: 2025-08-13

## 2025-08-13 RX ORDER — GLUCOSAMINE HCL/CHONDROITIN SU 500-400 MG
CAPSULE ORAL
Qty: 100 STRIP | Refills: 3 | Status: SHIPPED | OUTPATIENT
Start: 2025-08-13

## 2025-08-18 DIAGNOSIS — R73.03 PREDIABETES: ICD-10-CM

## 2025-08-19 RX ORDER — AVOBENZONE, HOMOSALATE, OCTISALATE, OCTOCRYLENE 30; 40; 45; 26 MG/ML; MG/ML; MG/ML; MG/ML
CREAM TOPICAL
Qty: 100 EACH | Refills: 3 | Status: SHIPPED | OUTPATIENT
Start: 2025-08-19

## 2025-08-19 RX ORDER — GLUCOSAMINE HCL/CHONDROITIN SU 500-400 MG
CAPSULE ORAL
Qty: 100 STRIP | Refills: 3 | Status: SHIPPED | OUTPATIENT
Start: 2025-08-19

## 2025-08-19 RX ORDER — BLOOD-GLUCOSE METER
1 KIT MISCELLANEOUS DAILY
Qty: 1 KIT | Refills: 0 | Status: SHIPPED | OUTPATIENT
Start: 2025-08-19 | End: 2025-08-21 | Stop reason: SDUPTHER

## 2025-08-21 DIAGNOSIS — R73.03 PREDIABETES: ICD-10-CM

## 2025-08-21 RX ORDER — BLOOD-GLUCOSE METER
1 KIT MISCELLANEOUS DAILY
Qty: 1 KIT | Refills: 0 | Status: SHIPPED | OUTPATIENT
Start: 2025-08-21

## 2025-08-22 DIAGNOSIS — I25.10 ASCVD (ARTERIOSCLEROTIC CARDIOVASCULAR DISEASE): ICD-10-CM

## 2025-08-22 DIAGNOSIS — I73.9 PERIPHERAL VASCULAR DISEASE, UNSPECIFIED: ICD-10-CM

## 2025-08-22 DIAGNOSIS — R73.09 ELEVATED HEMOGLOBIN A1C: Primary | ICD-10-CM

## 2025-08-22 DIAGNOSIS — I10 ESSENTIAL HYPERTENSION: ICD-10-CM

## 2025-08-22 DIAGNOSIS — R73.03 PREDIABETES: ICD-10-CM

## 2025-08-22 DIAGNOSIS — R73.01 IMPAIRED FASTING GLUCOSE: ICD-10-CM

## 2025-08-22 RX ORDER — BLOOD-GLUCOSE METER
KIT MISCELLANEOUS
Refills: 0 | OUTPATIENT
Start: 2025-08-22

## 2025-08-26 DIAGNOSIS — I25.10 ASCVD (ARTERIOSCLEROTIC CARDIOVASCULAR DISEASE): ICD-10-CM

## 2025-08-26 DIAGNOSIS — I10 ESSENTIAL HYPERTENSION: ICD-10-CM

## 2025-08-26 DIAGNOSIS — R73.01 IMPAIRED FASTING GLUCOSE: ICD-10-CM

## 2025-08-26 DIAGNOSIS — R73.09 ELEVATED HEMOGLOBIN A1C: ICD-10-CM

## 2025-08-26 DIAGNOSIS — R73.03 PREDIABETES: ICD-10-CM

## 2025-08-26 DIAGNOSIS — I73.9 PERIPHERAL VASCULAR DISEASE, UNSPECIFIED: ICD-10-CM

## 2025-09-02 ENCOUNTER — TELEPHONE (OUTPATIENT)
Dept: FAMILY MEDICINE CLINIC | Age: 75
End: 2025-09-02

## 2025-09-04 RX ORDER — BLOOD-GLUCOSE METER
EACH MISCELLANEOUS
Qty: 1 KIT | Refills: 0 | OUTPATIENT
Start: 2025-09-04

## (undated) DEVICE — INTENDED FOR TISSUE SEPARATION, AND OTHER PROCEDURES THAT REQUIRE A SHARP SURGICAL BLADE TO PUNCTURE OR CUT.: Brand: BARD-PARKER ® STAINLESS STEEL BLADES

## (undated) DEVICE — SUTURE PROL SZ 6-0 L24IN NONABSORBABLE BLU L13MM C-1 3/8 8726H

## (undated) DEVICE — FOGARTY - HYDRAGRIP SURGICAL - CLAMP INSERTS: Brand: FOGARTY SOFTJAW

## (undated) DEVICE — CATHETER ADAPTER: Brand: ADDTO

## (undated) DEVICE — GEL US 20GM NONIRRITATING OVERWRAPPED FILE PCH TRNSMIT

## (undated) DEVICE — ELECTRODE ES AD CRDLSS PT RET REM POLYHESIVE

## (undated) DEVICE — LINER SUCT CANSTR 1500CC SEMI RIG W/ POR HYDROPHOBIC SHUT

## (undated) DEVICE — SPONGE LAP W18XL18IN WHT COT 4 PLY FLD STRUNG RADPQ DISP ST

## (undated) DEVICE — Device

## (undated) DEVICE — ANESTHESIA CIRCUIT ADULT-LF: Brand: MEDLINE INDUSTRIES, INC.

## (undated) DEVICE — GLOVE SURG SZ 65 L12IN FNGR THK87MIL WHT LTX FREE

## (undated) DEVICE — Z INACTIVE USE 2641837 CLIP LIG M BLU TI HRT SHP WIRE HORZ 600 PER BX

## (undated) DEVICE — GLOVE SURG SZ 6 THK91MIL LTX FREE SYN POLYISOPRENE ANTI

## (undated) DEVICE — SHEET, T, LAPAROTOMY, STERILE: Brand: MEDLINE

## (undated) DEVICE — SUTURE PERMA-HAND SZ 0 L30IN NONABSORBABLE BLK L36MM CT-1 424H

## (undated) DEVICE — DUAL LUMEN STOMACH TUBE: Brand: SALEM SUMP

## (undated) DEVICE — GOWN,SIRUS,FABRNF,RAGLAN,XL,ST,28/CS: Brand: MEDLINE

## (undated) DEVICE — GAUZE,SPONGE,4"X4",16PLY,XRAY,STRL,LF: Brand: MEDLINE

## (undated) DEVICE — CORD ES L15FT PT RET REUSE VALLEYLAB REM

## (undated) DEVICE — GOWN,SIRUS,FABRNF,RAGLAN,L,ST,30/CS: Brand: MEDLINE

## (undated) DEVICE — ADAPTER,CATHETER/SYRINGE/LUER,STERILE: Brand: MEDLINE

## (undated) DEVICE — COUNTER NDL 60 COUNT FOAM STRP SGL MAG

## (undated) DEVICE — SUTURE ABSORBABLE BRAIDED 2-0 CT-1 27 IN UD VICRYL J259H

## (undated) DEVICE — CANNULA SAPH VEIN OPQ BLNT 3MM

## (undated) DEVICE — GLOVE SURG SZ 7 FNGR THK94MIL TRNSLUC YEL LTX HYDRGEL GRP

## (undated) DEVICE — TOWEL,OR,DSP,ST,WHITE,DLX,XR,4/PK,20PK/C: Brand: MEDLINE

## (undated) DEVICE — SOLUTION IV IRRIG WATER 1000ML POUR BRL 2F7114

## (undated) DEVICE — YANKAUER,FLEXIBLE HANDLE,REGLR CAPACITY: Brand: MEDLINE INDUSTRIES, INC.

## (undated) DEVICE — DRAPE,UTILITY,XL,4/PK,STERILE: Brand: MEDLINE

## (undated) DEVICE — CONTAINER,SPECIMEN,OR STERILE,4OZ: Brand: MEDLINE

## (undated) DEVICE — TUBING, SUCTION, 9/32" X 10', STRAIGHT: Brand: MEDLINE

## (undated) DEVICE — SUTURE NONABSORBABLE MONOFILAMENT 5-0 C-1 1X24 IN PROLENE 8725H

## (undated) DEVICE — SUTURE PROL SZ 6-0 L18IN NONABSORBABLE BLU L13MM C-1 3/8 8718H

## (undated) DEVICE — TELFA NON-ADHERENT ABSORBENT DRESSING: Brand: TELFA

## (undated) DEVICE — Z INACTIVE USE 2535480 CLIP LIG M BLU TI HRT SHP WIRE HORZ 180 PER BX

## (undated) DEVICE — MARKER,SKIN,WI/RULER AND LABELS: Brand: MEDLINE

## (undated) DEVICE — 3M™ IOBAN™ 2 ANTIMICROBIAL INCISE DRAPE 6650EZ: Brand: IOBAN™ 2

## (undated) DEVICE — ELECTRODE ES L4IN PTFE INSUL BLDE W/ SFTY SL DISP EDGE

## (undated) DEVICE — SOLUTION IV 1000ML 0.9% SOD CHL FOR IRRIG PLAS CONT

## (undated) DEVICE — CLIP INT SM WIDE RED TI TRNSVRS GRV CHEVRON SHP W/ PRECIS

## (undated) DEVICE — LOOP VES W25MM THK1MM MAXI RED SIL FLD REPELLENT 100 PER

## (undated) DEVICE — BLADE CLIPPER GEN PURP NS

## (undated) DEVICE — CHLORAPREP 26ML ORANGE

## (undated) DEVICE — SUTURE BOOT: Brand: DEROYAL

## (undated) DEVICE — TOTAL TRAY, DB, 100% SILI FOLEY, 16FR 10: Brand: MEDLINE

## (undated) DEVICE — DRESSING TRNSPAR W5XL4.5IN FLM SHT SEMIPERMEABLE WIND

## (undated) DEVICE — ELECTRODE ES L2.75IN S STL INSUL BLDE W/ SL EDGE

## (undated) DEVICE — TUBING, SUCTION, 3/16" X 10', STRAIGHT: Brand: MEDLINE

## (undated) DEVICE — SUTURE MCRYL SZ 3-0 L27IN ABSRB UD L24MM PS-1 3/8 CIR PRIM Y936H

## (undated) DEVICE — SYRINGE IRRIG 60ML SFT PLIABLE BLB EZ TO GRP 1 HND USE W/

## (undated) DEVICE — GLOVE SURG SZ 7 L12IN FNGR THK87MIL WHT LTX FREE

## (undated) DEVICE — PENCIL ES CRD L10FT HND SWCHING ROCK SWCH W/ EDGE COAT BLDE

## (undated) DEVICE — GLOVE ORANGE PI 7   MSG9070

## (undated) DEVICE — FOGARTY SPRING CLIPS 6MM: Brand: FOGARTY SOFTJAW

## (undated) DEVICE — CLIP SM RED INTERN HMOCLP TITAN LIGATING

## (undated) DEVICE — SKIN AFFIX SURG ADHESIVE 72/CS 0.55ML: Brand: MEDLINE

## (undated) DEVICE — TOWEL,OR,DSP,ST,BLUE,STD,6/PK,12PK/CS: Brand: MEDLINE

## (undated) DEVICE — LINER,SEMI-RIGID,3000CC,50EA/CS: Brand: MEDLINE

## (undated) DEVICE — DECANTER FLD 9IN ST BG FOR ASEP TRNSF OF FLD

## (undated) DEVICE — GLOVE SURG SZ 75 L12IN FNGR THK87MIL WHT LTX FREE

## (undated) DEVICE — SUTURE PERMAHAND SZ 2-0 L17X18IN NONABSORBABLE BLK SILK SA65H

## (undated) DEVICE — SOLUTION IV IRRIG POUR BRL 0.9% SODIUM CHL 2F7124

## (undated) DEVICE — SUTURE VCRL 3-0 L36IN ABSRB VLT CT-1 L36MM 1/2 CIR J344H